# Patient Record
Sex: FEMALE | Race: WHITE | Employment: OTHER | URBAN - NONMETROPOLITAN AREA
[De-identification: names, ages, dates, MRNs, and addresses within clinical notes are randomized per-mention and may not be internally consistent; named-entity substitution may affect disease eponyms.]

---

## 2017-06-13 DIAGNOSIS — C80.1 MALIGNANT NEOPLASM (HCC): ICD-10-CM

## 2017-06-13 DIAGNOSIS — C50.919 MALIGNANT NEOPLASM OF OTHER SPECIFIED SITES OF FEMALE BREAST: ICD-10-CM

## 2017-06-14 PROBLEM — C50.919 MALIGNANT NEOPLASM OF OTHER SPECIFIED SITES OF FEMALE BREAST: Status: ACTIVE | Noted: 2017-06-14

## 2017-06-14 PROBLEM — C50.919 BREAST CANCER (HCC): Status: ACTIVE | Noted: 2017-06-14

## 2017-06-14 PROBLEM — C80.1 MALIGNANT NEOPLASM (HCC): Status: ACTIVE | Noted: 2017-06-14

## 2017-06-14 PROBLEM — I10 HTN (HYPERTENSION): Status: ACTIVE | Noted: 2017-06-14

## 2017-06-14 RX ORDER — LANOLIN ALCOHOL/MO/W.PET/CERES
3 CREAM (GRAM) TOPICAL NIGHTLY
COMMUNITY
End: 2018-11-07 | Stop reason: SDUPTHER

## 2017-06-14 RX ORDER — DIPHENHYDRAMINE HCL 25 MG
37.5 CAPSULE ORAL NIGHTLY
COMMUNITY
End: 2020-01-06

## 2017-06-14 RX ORDER — POLYETHYLENE GLYCOL 3350 17 G/17G
17 POWDER, FOR SOLUTION ORAL DAILY
COMMUNITY
End: 2017-08-09 | Stop reason: SDUPTHER

## 2017-07-05 ENCOUNTER — OFFICE VISIT (OUTPATIENT)
Dept: FAMILY MEDICINE CLINIC | Age: 82
End: 2017-07-05
Payer: MEDICARE

## 2017-07-05 VITALS
BODY MASS INDEX: 25.98 KG/M2 | HEIGHT: 63 IN | DIASTOLIC BLOOD PRESSURE: 60 MMHG | HEART RATE: 58 BPM | WEIGHT: 146.6 LBS | SYSTOLIC BLOOD PRESSURE: 100 MMHG

## 2017-07-05 DIAGNOSIS — R41.3 MEMORY CHANGES: ICD-10-CM

## 2017-07-05 DIAGNOSIS — I10 ESSENTIAL HYPERTENSION: Primary | ICD-10-CM

## 2017-07-05 DIAGNOSIS — Z13.1 SCREENING FOR DIABETES MELLITUS (DM): ICD-10-CM

## 2017-07-05 PROCEDURE — 1090F PRES/ABSN URINE INCON ASSESS: CPT | Performed by: FAMILY MEDICINE

## 2017-07-05 PROCEDURE — G8419 CALC BMI OUT NRM PARAM NOF/U: HCPCS | Performed by: FAMILY MEDICINE

## 2017-07-05 PROCEDURE — 1036F TOBACCO NON-USER: CPT | Performed by: FAMILY MEDICINE

## 2017-07-05 PROCEDURE — G8400 PT W/DXA NO RESULTS DOC: HCPCS | Performed by: FAMILY MEDICINE

## 2017-07-05 PROCEDURE — 4040F PNEUMOC VAC/ADMIN/RCVD: CPT | Performed by: FAMILY MEDICINE

## 2017-07-05 PROCEDURE — 99214 OFFICE O/P EST MOD 30 MIN: CPT | Performed by: FAMILY MEDICINE

## 2017-07-05 PROCEDURE — 1123F ACP DISCUSS/DSCN MKR DOCD: CPT | Performed by: FAMILY MEDICINE

## 2017-07-05 PROCEDURE — G8427 DOCREV CUR MEDS BY ELIG CLIN: HCPCS | Performed by: FAMILY MEDICINE

## 2017-07-05 ASSESSMENT — PATIENT HEALTH QUESTIONNAIRE - PHQ9
1. LITTLE INTEREST OR PLEASURE IN DOING THINGS: 0
2. FEELING DOWN, DEPRESSED OR HOPELESS: 0
SUM OF ALL RESPONSES TO PHQ QUESTIONS 1-9: 0
SUM OF ALL RESPONSES TO PHQ9 QUESTIONS 1 & 2: 0

## 2017-07-05 ASSESSMENT — ENCOUNTER SYMPTOMS
CHEST TIGHTNESS: 0
SHORTNESS OF BREATH: 0
COLOR CHANGE: 1
CONSTIPATION: 1
ABDOMINAL PAIN: 0
DIARRHEA: 1

## 2017-08-09 RX ORDER — POLYETHYLENE GLYCOL 3350 17 G/17G
17 POWDER, FOR SOLUTION ORAL DAILY
Qty: 1 BOTTLE | Refills: 3 | Status: SHIPPED | OUTPATIENT
Start: 2017-08-09 | End: 2017-08-10 | Stop reason: SDUPTHER

## 2017-08-10 RX ORDER — POLYETHYLENE GLYCOL 3350 17 G/17G
17 POWDER, FOR SOLUTION ORAL DAILY
Qty: 1 BOTTLE | Refills: 5 | Status: SHIPPED | OUTPATIENT
Start: 2017-08-10 | End: 2017-12-27 | Stop reason: SDUPTHER

## 2017-10-24 ENCOUNTER — NURSE ONLY (OUTPATIENT)
Dept: FAMILY MEDICINE CLINIC | Age: 82
End: 2017-10-24
Payer: MEDICARE

## 2017-10-24 VITALS — TEMPERATURE: 97.2 F

## 2017-10-24 DIAGNOSIS — Z23 NEED FOR PROPHYLACTIC VACCINATION AND INOCULATION AGAINST INFLUENZA: Primary | ICD-10-CM

## 2017-10-24 PROCEDURE — G0008 ADMIN INFLUENZA VIRUS VAC: HCPCS | Performed by: FAMILY MEDICINE

## 2017-10-24 PROCEDURE — 90662 IIV NO PRSV INCREASED AG IM: CPT | Performed by: FAMILY MEDICINE

## 2017-12-14 LAB
AGE FOR GFR: 85
ANION GAP SERPL CALCULATED.3IONS-SCNC: 15 MMOL/L
BUN BLDV-MCNC: 17 MG/DL
CALCIUM SERPL-MCNC: 9.6 MG/DL
CHLORIDE BLD-SCNC: 102 MMOL/L
CO2: 26 MMOL/L
CREAT SERPL-MCNC: 1 MG/DL
EGFR BF: 64 ML/MIN/1.73 M2
EGFR BM: 86 ML/MIN/1.73 M2
EGFR WF: 53 ML/MIN/1.73 M2
EGFR WM: 71 ML/MIN/1.73 M2
GLUCOSE: 81 MG/DL
POTASSIUM SERPL-SCNC: 4.5 MMOL/L
SODIUM BLD-SCNC: 138 MMOL/L

## 2017-12-27 ENCOUNTER — OFFICE VISIT (OUTPATIENT)
Dept: FAMILY MEDICINE CLINIC | Age: 82
End: 2017-12-27
Payer: MEDICARE

## 2017-12-27 VITALS
BODY MASS INDEX: 26.39 KG/M2 | WEIGHT: 149 LBS | SYSTOLIC BLOOD PRESSURE: 124 MMHG | TEMPERATURE: 97.8 F | DIASTOLIC BLOOD PRESSURE: 70 MMHG | OXYGEN SATURATION: 97 % | HEART RATE: 67 BPM

## 2017-12-27 DIAGNOSIS — R05.9 COUGH: Primary | ICD-10-CM

## 2017-12-27 DIAGNOSIS — L30.9 DERMATITIS: ICD-10-CM

## 2017-12-27 DIAGNOSIS — M54.2 NECK PAIN: ICD-10-CM

## 2017-12-27 LAB
BASOPHILS # BLD: 0.14 THOU/MM3
DIFFERENTIAL: AUTOMATED DIFF
EOSINOPHIL # BLD: 0.43 THOU/MM3
HCT VFR BLD CALC: 43.5 %
HEMOGLOBIN: 13.8 G/DL
LYMPHOCYTES # BLD: 1.51 THOU/MM3
MCH RBC QN AUTO: 29 PG
MCHC RBC AUTO-ENTMCNC: 31.6 G/DL
MCV RBC AUTO: 91.6 FL
MONOCYTES # BLD: 1.04 THOU/MM3
NEUTROPHILS: 5.55 THOU/MM3
PDW BLD-RTO: 12.7 %
PLATELET # BLD: 236 THOU/MM3
PMV BLD AUTO: 10.2 FL
RBC # BLD: 4.76 M/UL
SEDIMENTATION RATE, ERYTHROCYTE: 29 MM/HR
WBC # BLD: 8.66 THOU/ML3

## 2017-12-27 PROCEDURE — 4040F PNEUMOC VAC/ADMIN/RCVD: CPT | Performed by: FAMILY MEDICINE

## 2017-12-27 PROCEDURE — G8400 PT W/DXA NO RESULTS DOC: HCPCS | Performed by: FAMILY MEDICINE

## 2017-12-27 PROCEDURE — 1090F PRES/ABSN URINE INCON ASSESS: CPT | Performed by: FAMILY MEDICINE

## 2017-12-27 PROCEDURE — 1036F TOBACCO NON-USER: CPT | Performed by: FAMILY MEDICINE

## 2017-12-27 PROCEDURE — G8427 DOCREV CUR MEDS BY ELIG CLIN: HCPCS | Performed by: FAMILY MEDICINE

## 2017-12-27 PROCEDURE — 99214 OFFICE O/P EST MOD 30 MIN: CPT | Performed by: FAMILY MEDICINE

## 2017-12-27 PROCEDURE — G8484 FLU IMMUNIZE NO ADMIN: HCPCS | Performed by: FAMILY MEDICINE

## 2017-12-27 PROCEDURE — G8417 CALC BMI ABV UP PARAM F/U: HCPCS | Performed by: FAMILY MEDICINE

## 2017-12-27 PROCEDURE — 1123F ACP DISCUSS/DSCN MKR DOCD: CPT | Performed by: FAMILY MEDICINE

## 2017-12-27 RX ORDER — RANITIDINE 150 MG/1
75 TABLET ORAL 2 TIMES DAILY
Qty: 60 TABLET | Refills: 3 | Status: SHIPPED | OUTPATIENT
Start: 2017-12-27 | End: 2018-07-11 | Stop reason: SDUPTHER

## 2017-12-27 RX ORDER — POLYETHYLENE GLYCOL 3350 17 G/17G
17 POWDER, FOR SOLUTION ORAL DAILY
Qty: 1 BOTTLE | Refills: 5 | Status: SHIPPED | OUTPATIENT
Start: 2017-12-27 | End: 2018-07-11 | Stop reason: SDUPTHER

## 2017-12-27 RX ORDER — CEPHALEXIN 500 MG/1
1000 CAPSULE ORAL 2 TIMES DAILY
Qty: 40 CAPSULE | Refills: 0 | Status: SHIPPED | OUTPATIENT
Start: 2017-12-27 | End: 2018-01-08 | Stop reason: ALTCHOICE

## 2017-12-27 ASSESSMENT — ENCOUNTER SYMPTOMS
WHEEZING: 0
SHORTNESS OF BREATH: 0
DIARRHEA: 0
CONSTIPATION: 0
COUGH: 1
CHEST TIGHTNESS: 0
CHOKING: 0

## 2017-12-27 NOTE — PROGRESS NOTES
[address] [place] [address]  [address]  Dept: [phone]  Dept Fax: [phone]    Taryn Cheney is a 80 y.o. female who presents today for her medical conditions/complaints as noted below. Taryn Cheney is c/o of Neck Pain (for about 1 week now, using IBU on Saturday for the pain ) and Rash (neck and hands, more on the neck one little spot on the palm of the hand and about 4 on the neck, stated those she has ahd about 1 month now)      HPI:     HPI  Started with a rash along the neck about 1 month ago noticed a spot in the back of the neck- itchy. Daughter then notcied this weekend 4 spots on the back of her neck. Small spots on her palm noticed in the last week that itches also. Stiff and achey on the right side of the neck on Saturday but did improve since that time. HAs a cough real hard, not like a cold cough. For the last several months. Has just \"not felt well\" coughs real hard a few times feels like it comes from deeper. Nothing that comes up with it. Not SOB at all. Daughters notice that her voice is not as strong, she complains that she cannot sing like she used to,  The last month or so. No SOB at all. No chills. No nausea, no vomiting no burning or indigestion. \"just an uncomfortable feeling\" when she coughs. Takes a 1/2 of a pill of her husbands he had for his cough and that helps.       BP Readings from Last 3 Encounters:   12/27/17 124/70   07/05/17 100/60          (goal 120/80)    Wt Readings from Last 3 Encounters:   12/27/17 149 lb (67.6 kg)   07/05/17 146 lb 9.6 oz (66.5 kg)        Past Medical History:   Diagnosis Date    Arthritis     neck    BRCA2 genetic carrier     no specifics given     Hypertension       Past Surgical History:   Procedure Laterality Date    BREAST SURGERY Bilateral 1982 2006 bilateral masectomy    COLONOSCOPY      ESOPHGOSCOPY  07/2016    UPPER GASTROINTESTINAL ENDOSCOPY  07/2016 January appt. If the spots on the neck are not improving then she would need to consider biopsy or CT scan of the neck. Lab Results   Component Value Date    WBC 8.66 12/27/2017    HGB 13.8 12/27/2017    HCT 43.5 12/27/2017     12/27/2017     12/14/2017    K 4.5 12/14/2017     12/14/2017    CREATININE 1.0 12/14/2017    BUN 17 12/14/2017    CO2 26 12/14/2017       Return for As scheduled. Patient given educational materials - see patient instructions. Discussed use, benefit, and side effects of prescribed medications. All patient questions answered. Pt voiced understanding. Reviewed health maintenance. Instructed to continue current medications, diet and exercise. Patient agreed with treatment plan. Follow up as directed.      Electronically signed by Deann Lopez MD on 12/27/2017

## 2017-12-27 NOTE — PATIENT INSTRUCTIONS
Try not to wear clothing that rubs on the neck area. Can use OTC hydrocortisone cream on the area on the neck as well as on the hand areas. Will check the CXR and start the twice daily ranitidine for the cough. Will do a short course of oral antibiotics for the neck spots and recheck these at January appt.

## 2018-01-08 ENCOUNTER — OFFICE VISIT (OUTPATIENT)
Dept: FAMILY MEDICINE CLINIC | Age: 83
End: 2018-01-08
Payer: MEDICARE

## 2018-01-08 VITALS
DIASTOLIC BLOOD PRESSURE: 70 MMHG | HEART RATE: 60 BPM | HEIGHT: 63 IN | WEIGHT: 147 LBS | BODY MASS INDEX: 26.05 KG/M2 | OXYGEN SATURATION: 97 % | SYSTOLIC BLOOD PRESSURE: 120 MMHG | TEMPERATURE: 97.6 F

## 2018-01-08 DIAGNOSIS — C80.1 MALIGNANT NEOPLASM (HCC): ICD-10-CM

## 2018-01-08 DIAGNOSIS — Z00.00 ROUTINE GENERAL MEDICAL EXAMINATION AT A HEALTH CARE FACILITY: ICD-10-CM

## 2018-01-08 DIAGNOSIS — R19.8 INCREASED ABDOMINAL GIRTH: ICD-10-CM

## 2018-01-08 DIAGNOSIS — Z23 NEED FOR PROPHYLACTIC VACCINATION AGAINST DIPHTHERIA-TETANUS-PERTUSSIS (DTP): ICD-10-CM

## 2018-01-08 DIAGNOSIS — L30.9 HAND DERMATITIS: Primary | ICD-10-CM

## 2018-01-08 PROCEDURE — 99214 OFFICE O/P EST MOD 30 MIN: CPT | Performed by: FAMILY MEDICINE

## 2018-01-08 PROCEDURE — 4040F PNEUMOC VAC/ADMIN/RCVD: CPT | Performed by: FAMILY MEDICINE

## 2018-01-08 PROCEDURE — G8400 PT W/DXA NO RESULTS DOC: HCPCS | Performed by: FAMILY MEDICINE

## 2018-01-08 PROCEDURE — 1036F TOBACCO NON-USER: CPT | Performed by: FAMILY MEDICINE

## 2018-01-08 PROCEDURE — G8484 FLU IMMUNIZE NO ADMIN: HCPCS | Performed by: FAMILY MEDICINE

## 2018-01-08 PROCEDURE — 1123F ACP DISCUSS/DSCN MKR DOCD: CPT | Performed by: FAMILY MEDICINE

## 2018-01-08 PROCEDURE — 1090F PRES/ABSN URINE INCON ASSESS: CPT | Performed by: FAMILY MEDICINE

## 2018-01-08 PROCEDURE — G8417 CALC BMI ABV UP PARAM F/U: HCPCS | Performed by: FAMILY MEDICINE

## 2018-01-08 PROCEDURE — G0439 PPPS, SUBSEQ VISIT: HCPCS | Performed by: FAMILY MEDICINE

## 2018-01-08 PROCEDURE — G8427 DOCREV CUR MEDS BY ELIG CLIN: HCPCS | Performed by: FAMILY MEDICINE

## 2018-01-08 RX ORDER — FLUTICASONE PROPIONATE 0.05 MG/G
OINTMENT TOPICAL
Qty: 15 G | Refills: 1 | Status: SHIPPED | OUTPATIENT
Start: 2018-01-08 | End: 2018-01-31 | Stop reason: ALTCHOICE

## 2018-01-08 ASSESSMENT — LIFESTYLE VARIABLES
HOW OFTEN DO YOU HAVE A DRINK CONTAINING ALCOHOL: 3
HAVE YOU OR SOMEONE ELSE BEEN INJURED AS A RESULT OF YOUR DRINKING: 0
AUDIT TOTAL SCORE: 3
HAS A RELATIVE, FRIEND, DOCTOR, OR ANOTHER HEALTH PROFESSIONAL EXPRESSED CONCERN ABOUT YOUR DRINKING OR SUGGESTED YOU CUT DOWN: 0
HOW OFTEN DURING THE LAST YEAR HAVE YOU NEEDED AN ALCOHOLIC DRINK FIRST THING IN THE MORNING TO GET YOURSELF GOING AFTER A NIGHT OF HEAVY DRINKING: 0
HOW OFTEN DURING THE LAST YEAR HAVE YOU HAD A FEELING OF GUILT OR REMORSE AFTER DRINKING: 0
HOW OFTEN DURING THE LAST YEAR HAVE YOU BEEN UNABLE TO REMEMBER WHAT HAPPENED THE NIGHT BEFORE BECAUSE YOU HAD BEEN DRINKING: 0
HOW OFTEN DO YOU HAVE SIX OR MORE DRINKS ON ONE OCCASION: 0
AUDIT-C TOTAL SCORE: 3
HOW MANY STANDARD DRINKS CONTAINING ALCOHOL DO YOU HAVE ON A TYPICAL DAY: 0
HOW OFTEN DURING THE LAST YEAR HAVE YOU FOUND THAT YOU WERE NOT ABLE TO STOP DRINKING ONCE YOU HAD STARTED: 0
HOW OFTEN DURING THE LAST YEAR HAVE YOU FAILED TO DO WHAT WAS NORMALLY EXPECTED FROM YOU BECAUSE OF DRINKING: 0

## 2018-01-08 ASSESSMENT — ANXIETY QUESTIONNAIRES: GAD7 TOTAL SCORE: 0

## 2018-01-08 NOTE — PROGRESS NOTES
place, and time. She appears well-developed and well-nourished. HENT:   Head: Normocephalic and atraumatic. Eyes: Conjunctivae and EOM are normal.   Neck: Normal range of motion. Neck supple. No JVD present. No thyromegaly present. Mild kyphosis of the neck   Cardiovascular: Normal rate, regular rhythm and intact distal pulses. Exam reveals no gallop and no friction rub. No murmur heard. Pulmonary/Chest: Effort normal and breath sounds normal. No respiratory distress. Abdominal: Soft. Bowel sounds are normal. She exhibits distension and mass (firmness in the RLQ, ill defined, nontender). She exhibits no shifting dullness, no pulsatile liver, no abdominal bruit and no pulsatile midline mass. There is no hepatosplenomegaly, splenomegaly or hepatomegaly. There is no tenderness. There is no rebound, no tenderness at McBurney's point and negative Salas's sign. No hernia. Hernia confirmed negative in the ventral area, confirmed negative in the right inguinal area and confirmed negative in the left inguinal area. Musculoskeletal: She exhibits no edema. Lymphadenopathy:        Head (right side): No submental and no submandibular adenopathy present. Head (left side): No submental and no submandibular adenopathy present. She has no cervical adenopathy. Right cervical: No superficial cervical adenopathy present. Left cervical: No superficial cervical adenopathy present. Neurological: She is alert and oriented to person, place, and time. Skin: Skin is warm. Erythematous macules on the left palm, ill defined with small vesicles noted. Similar lesions on left hand between the 3-4 finger   Nursing note and vitals reviewed. Patient's complete Health Risk Assessment and screening values have been reviewed and are found in Flowsheets. The following problems were reviewed today and where indicated follow up appointments were made and/or referrals ordered.     1. Hand Date(s) Administered    Influenza Vaccine, unspecified formulation 09/13/2013, 10/13/2014, 10/20/2016    Influenza, High Dose 10/24/2017    Pneumococcal 13-valent Conjugate ([id number]) 08/05/2015    Pneumococcal Polysaccharide ([id number]) 11/18/2008    Zoster 08/04/2014        Health Maintenance   Topic Date Due    DTaP/Tdap/Td vaccine (1 - Tdap) 01/09/1951    Zostavax vaccine  Completed    DEXA (modify frequency per FRAX score)  Completed    Flu vaccine  Completed    Pneumococcal low/med risk  Completed     Recommendations for Preventive Services Due: see orders.   Recommended screening schedule for the next 5-10 years is provided to the patient in written form: see Patient Instructions/AVS.

## 2018-01-08 NOTE — PATIENT INSTRUCTIONS
Personalized Preventive Plan for Celnea Anderson - 1/8/2018  Medicare offers a range of preventive health benefits. Some of the tests and screenings are paid in full while other may be subject to a deductible, co-insurance, and/or copay. Some of these benefits include a comprehensive review of your medical history including lifestyle, illnesses that may run in your family, and various assessments and screenings as appropriate. After reviewing your medical record and screening and assessments performed today your provider may have ordered immunizations, labs, imaging, and/or referrals for you. A list of these orders (if applicable) as well as your Preventive Care list are included within your After Visit Summary for your review. Other Preventive Recommendations:    · A preventive eye exam performed by an eye specialist is recommended every 1-2 years to screen for glaucoma; cataracts, macular degeneration, and other eye disorders. · A preventive dental visit is recommended every 6 months. · Try to get at least 150 minutes of exercise per week or 10,000 steps per day on a pedometer . · Order or download the FREE \"Exercise & Physical Activity: Your Everyday Guide\" from [employer]. Call 3-794.446.9750 or search [employer] online. · You need 9730-4541 mg of calcium and 5690-6213 IU of vitamin D per day. It is possible to meet your calcium requirement with diet alone, but a vitamin D supplement is usually necessary to meet this goal.  · When exposed to the sun, use a sunscreen that protects against both UVA and UVB radiation with an SPF of 30 or greater. Reapply every 2 to 3 hours or after sweating, drying off with a towel, or swimming. · Always wear a seat belt when traveling in a car.

## 2018-01-12 ENCOUNTER — TELEPHONE (OUTPATIENT)
Dept: FAMILY MEDICINE CLINIC | Age: 83
End: 2018-01-12

## 2018-01-15 ENCOUNTER — OFFICE VISIT (OUTPATIENT)
Dept: FAMILY MEDICINE CLINIC | Age: 83
End: 2018-01-15
Payer: MEDICARE

## 2018-01-15 VITALS
SYSTOLIC BLOOD PRESSURE: 122 MMHG | OXYGEN SATURATION: 97 % | WEIGHT: 147 LBS | HEART RATE: 72 BPM | DIASTOLIC BLOOD PRESSURE: 60 MMHG | BODY MASS INDEX: 26.04 KG/M2

## 2018-01-15 DIAGNOSIS — R05.9 COUGH: ICD-10-CM

## 2018-01-15 DIAGNOSIS — C56.1 MALIGNANT NEOPLASM OF RIGHT OVARY (HCC): Primary | ICD-10-CM

## 2018-01-15 DIAGNOSIS — Z15.01 BRCA2 GENETIC CARRIER: ICD-10-CM

## 2018-01-15 DIAGNOSIS — Z15.09 BRCA2 GENETIC CARRIER: ICD-10-CM

## 2018-01-15 PROCEDURE — G8427 DOCREV CUR MEDS BY ELIG CLIN: HCPCS | Performed by: FAMILY MEDICINE

## 2018-01-15 PROCEDURE — 1090F PRES/ABSN URINE INCON ASSESS: CPT | Performed by: FAMILY MEDICINE

## 2018-01-15 PROCEDURE — G8484 FLU IMMUNIZE NO ADMIN: HCPCS | Performed by: FAMILY MEDICINE

## 2018-01-15 PROCEDURE — 1123F ACP DISCUSS/DSCN MKR DOCD: CPT | Performed by: FAMILY MEDICINE

## 2018-01-15 PROCEDURE — 1036F TOBACCO NON-USER: CPT | Performed by: FAMILY MEDICINE

## 2018-01-15 PROCEDURE — 99214 OFFICE O/P EST MOD 30 MIN: CPT | Performed by: FAMILY MEDICINE

## 2018-01-15 PROCEDURE — 4040F PNEUMOC VAC/ADMIN/RCVD: CPT | Performed by: FAMILY MEDICINE

## 2018-01-15 PROCEDURE — G8417 CALC BMI ABV UP PARAM F/U: HCPCS | Performed by: FAMILY MEDICINE

## 2018-01-15 RX ORDER — BENZONATATE 200 MG/1
200 CAPSULE ORAL 3 TIMES DAILY PRN
Qty: 30 CAPSULE | Refills: 0 | Status: SHIPPED | OUTPATIENT
Start: 2018-01-15 | End: 2018-02-15 | Stop reason: ALTCHOICE

## 2018-01-15 ASSESSMENT — ENCOUNTER SYMPTOMS
CHOKING: 1
ABDOMINAL PAIN: 0
COUGH: 1
ABDOMINAL DISTENTION: 1
WHEEZING: 0
CHEST TIGHTNESS: 1
SHORTNESS OF BREATH: 0

## 2018-01-22 LAB
A/G RATIO: 0.8 RATIO
AGE FOR GFR: 86
ALBUMIN: 3.4 G/DL
ALK PHOSPHATASE: 83 UNITS/L
ALT SERPL-CCNC: 34 UNITS/L
ANION GAP SERPL CALCULATED.3IONS-SCNC: 13 MMOL/L
APPEARANCE, BODY FLUID: NORMAL
AST SERPL-CCNC: 35 UNITS/L
BASOPHILS # BLD: 0.15 THOU/MM3
BILIRUB SERPL-MCNC: 0.3 MG/DL
BODY FLUID RBC: NORMAL
BODY FLUID TYPE: NORMAL
BODY FLUID WBC: NORMAL
BUN BLDV-MCNC: 16 MG/DL
CA 125: NORMAL
CALCIUM SERPL-MCNC: 8.7 MG/DL
CHLORIDE BLD-SCNC: 101 MMOL/L
CO2: 23 MMOL/L
COLOR, BODY FLUID: NORMAL
CREAT SERPL-MCNC: 0.8 MG/DL
DIFFERENTIAL: AUTOMATED DIFF
EGFR BF: 82 ML/MIN/1.73 M2
EGFR BM: 111 ML/MIN/1.73 M2
EGFR WF: 68 ML/MIN/1.73 M2
EGFR WM: 92 ML/MIN/1.73 M2
EOSINOPHIL # BLD: 0.55 THOU/MM3
GLOBULIN: 4.4 G/DL
GLUCOSE, FLUID: NORMAL
GLUCOSE: 111 MG/DL
HCT VFR BLD CALC: 40.8 %
HEMOGLOBIN: 13.9 G/DL
LACTATE DEHYDROGENASE: 1022 UNITS/L
LYMPHOCYTES # BLD: 2.43 THOU/MM3
MCH RBC QN AUTO: 30 PG
MCHC RBC AUTO-ENTMCNC: 34.1 G/DL
MCV RBC AUTO: 88 FL
MISCELLANEOUS LAB TEST RESULT: NORMAL
MISCELLANEOUS LAB TEST RESULT: NORMAL
MONOCYTES # BLD: 1.02 THOU/MM3
NEUTROPHILS: 7.88 THOU/MM3
PDW BLD-RTO: 12.1 %
PLATELET # BLD: 364 THOU/MM3
PMV BLD AUTO: 9.2 FL
POTASSIUM SERPL-SCNC: 4.2 MMOL/L
RBC # BLD: 4.64 M/UL
SODIUM BLD-SCNC: 133 MMOL/L
TOTAL PROTEIN: 7.8 G/DL
WBC # BLD: 12.03 THOU/ML3

## 2018-01-23 ENCOUNTER — TELEPHONE (OUTPATIENT)
Dept: FAMILY MEDICINE CLINIC | Age: 83
End: 2018-01-23

## 2018-01-25 ENCOUNTER — PREP FOR PROCEDURE (OUTPATIENT)
Dept: GYNECOLOGIC ONCOLOGY | Age: 83
End: 2018-01-25

## 2018-01-25 ENCOUNTER — OFFICE VISIT (OUTPATIENT)
Dept: GYNECOLOGIC ONCOLOGY | Age: 83
End: 2018-01-25
Payer: MEDICARE

## 2018-01-25 VITALS
OXYGEN SATURATION: 97 % | DIASTOLIC BLOOD PRESSURE: 74 MMHG | TEMPERATURE: 97.1 F | HEIGHT: 63 IN | SYSTOLIC BLOOD PRESSURE: 132 MMHG | BODY MASS INDEX: 25.12 KG/M2 | WEIGHT: 141.8 LBS | HEART RATE: 72 BPM

## 2018-01-25 DIAGNOSIS — Z15.01 BRCA2 GENETIC CARRIER: ICD-10-CM

## 2018-01-25 DIAGNOSIS — Z01.818 PREOP TESTING: Primary | ICD-10-CM

## 2018-01-25 DIAGNOSIS — R19.00 PELVIC MASS: Primary | ICD-10-CM

## 2018-01-25 DIAGNOSIS — N94.89 ADNEXAL MASS: ICD-10-CM

## 2018-01-25 DIAGNOSIS — R97.1 ELEVATED CA-125: ICD-10-CM

## 2018-01-25 DIAGNOSIS — Z15.09 BRCA2 GENETIC CARRIER: ICD-10-CM

## 2018-01-25 DIAGNOSIS — J90 PLEURAL EFFUSION: ICD-10-CM

## 2018-01-25 PROCEDURE — 4040F PNEUMOC VAC/ADMIN/RCVD: CPT | Performed by: OBSTETRICS & GYNECOLOGY

## 2018-01-25 PROCEDURE — 99203 OFFICE O/P NEW LOW 30 MIN: CPT | Performed by: OBSTETRICS & GYNECOLOGY

## 2018-01-25 PROCEDURE — G8417 CALC BMI ABV UP PARAM F/U: HCPCS | Performed by: OBSTETRICS & GYNECOLOGY

## 2018-01-25 PROCEDURE — G8427 DOCREV CUR MEDS BY ELIG CLIN: HCPCS | Performed by: OBSTETRICS & GYNECOLOGY

## 2018-01-25 PROCEDURE — 1090F PRES/ABSN URINE INCON ASSESS: CPT | Performed by: OBSTETRICS & GYNECOLOGY

## 2018-01-25 PROCEDURE — G8484 FLU IMMUNIZE NO ADMIN: HCPCS | Performed by: OBSTETRICS & GYNECOLOGY

## 2018-01-25 RX ORDER — TORSEMIDE 10 MG/1
10 TABLET ORAL
COMMUNITY
Start: 2018-01-22 | End: 2018-04-23 | Stop reason: ALTCHOICE

## 2018-01-25 RX ORDER — SODIUM CHLORIDE 0.9 % (FLUSH) 0.9 %
10 SYRINGE (ML) INJECTION EVERY 12 HOURS SCHEDULED
Status: CANCELLED | OUTPATIENT
Start: 2018-01-25

## 2018-01-25 RX ORDER — SODIUM CHLORIDE 0.9 % (FLUSH) 0.9 %
10 SYRINGE (ML) INJECTION PRN
Status: CANCELLED | OUTPATIENT
Start: 2018-01-25

## 2018-01-25 RX ORDER — SODIUM CHLORIDE 9 MG/ML
INJECTION, SOLUTION INTRAVENOUS CONTINUOUS
Status: CANCELLED | OUTPATIENT
Start: 2018-01-25

## 2018-01-25 ASSESSMENT — ENCOUNTER SYMPTOMS
DIARRHEA: 0
EYES NEGATIVE: 1
COUGH: 1
ALLERGIC/IMMUNOLOGIC NEGATIVE: 1
NAUSEA: 0
BLOOD IN STOOL: 0
CONSTIPATION: 0
ABDOMINAL PAIN: 0

## 2018-01-25 NOTE — PATIENT INSTRUCTIONS
Patient Education        Abdominal Hysterectomy: Before Your Surgery  What is an abdominal hysterectomy? Abdominal hysterectomy is surgery to remove the uterus. The cervix is often taken out too. This is done through a cut in the lower belly. The cut is called an incision. The ovaries and fallopian tubes also may be removed. The doctor may make a horizontal incision. This cut goes from side-to-side and is done at the pubic hairline. It's called a \"bikini cut. \" Or the incision may be vertical. This type goes up and down between the belly button and the pubic bone. Both types leave a scar that most often fades with time. After the surgery, you will no longer have periods or be able to get pregnant. Your doctor may have you take hormones if your ovaries were removed. He or she will talk to you about the risks and benefits of hormones. You can also discuss how long you should take them. This surgery probably won't lower your interest in sex. In fact, some women enjoy sex more. This may be because they no longer have to worry about birth control or heavy bleeding. Most women go home in 1 to 2 days. You will likely feel better each day. But you may need about 4 to 6 weeks to fully recover. Follow-up care is a key part of your treatment and safety. Be sure to make and go to all appointments, and call your doctor if you are having problems. It's also a good idea to know your test results and keep a list of the medicines you take. What happens before surgery? ?Surgery can be stressful. This information will help you understand what you can expect. And it will help you safely prepare for surgery. ? Preparing for surgery  ? · Understand exactly what surgery is planned, along with the risks, benefits, and other options. · Tell your doctors ALL the medicines, vitamins, supplements, and herbal remedies you take. Some of these can increase the risk of bleeding or interact with anesthesia. ?  · If you take blood routine. When should you call your doctor? · You have questions or concerns. ? · You don't understand how to prepare for your surgery. ? · You become ill before the surgery (such as fever, flu, or a cold). ? · You need to reschedule or have changed your mind about having the surgery. Where can you learn more? Go to [email or web address] and sign in to your [employer] account. Enter I748 in the [employer] box to learn more about \"Abdominal Hysterectomy: Before Your Surgery. \"     If you do not have an account, please click on the \"Sign Up Now\" link. Current as of: October 13, 2016  Content Version: 11.5  © 9742-6706 Healthwise, Incorporated. Care instructions adapted under license by [facility] ([facility]). If you have questions about a medical condition or this instruction, always ask your healthcare professional. [address] any warranty or liability for your use of this information.

## 2018-01-25 NOTE — PROGRESS NOTES
encounter. No orders of the defined types were placed in this encounter.          Electronically signed by Brooks Cuellar MD on 1/25/2018 at 1:04 PM

## 2018-01-25 NOTE — H&P
frequency, hematuria, pelvic pain, urgency, vaginal bleeding and vaginal discharge. Musculoskeletal: Negative. Skin: Negative. Allergic/Immunologic: Negative. Neurological: Negative. Hematological: Negative. Psychiatric/Behavioral: Negative. Past Medical History:   Diagnosis Date    Arthritis     neck    BRCA2 genetic carrier     no specifics given     Hypertension        Past Surgical History:   Procedure Laterality Date    BREAST SURGERY Bilateral 1982 2006 bilateral masectomy    COLONOSCOPY      ESOPHGOSCOPY  07/2016    UPPER GASTROINTESTINAL ENDOSCOPY  07/2016       Family History   Problem Relation Age of Onset    Breast Cancer Mother      breast cancer    Other Mother      gallstones, water retention    Other Father      parkinsons    Breast Cancer Other      states sibling    Hypertension Other      states sibling     Osteoarthritis Other      states sibling had Severe     Stroke Sister     Endometrial Cancer Sister     Breast Cancer Sister     BRCA 1/2 Sister     Hypertension Sister     Breast Cancer Daughter        Social History     Social History    Marital status:      Spouse name: Norm    Number of children: N/A    Years of education: N/A     Social History Main Topics    Smoking status: Never Smoker    Smokeless tobacco: Never Used    Alcohol use 0.6 oz/week     1 Glasses of wine per week    Drug use: No    Sexual activity: Not Currently     Partners: Male     Other Topics Concern    None     Social History Narrative    None       Past [address] does contribute to today's presenting complaint.       Current Outpatient Prescriptions   Medication Sig Dispense Refill    BOOSTRIX 5-2.5-18.5 injection       torsemide (DEMADEX) 10 MG tablet       metoprolol tartrate (LOPRESSOR) 25 MG tablet TAKE 1 TABLET TWICE DAILY (Patient taking differently: TAKE 1 1/2 TABLET TWICE DAILY) 180 tablet 3    benzonatate (TESSALON) 200 MG capsule Take 1 capsule by elderly female with a large pelvic mass, pleural effusion, elevated CA-125 and history of a BRCA2 gene mutation. Although the pleural effusion has not been confirmed to be malignant, this clinical picture is highly suspicious for either a primary ovarian cancer or possibly recurrent breast cancer metastatic to both ovaries. We discussed options including surgical evaluation with total abdominal hysterectomy, bilateral salpingo-oophorectomy, tumor debulking or staging as indicated versus neoadjuvant chemotherapy versus palliative care. The patient is interested in possibly pursuing surgical extirpation of the mass as she feels that this may contribute to an extended quality of life as opposed to doing nothing. I think that she is a reasonable candidate for surgery but I did tell her that she may have to have her pleural effusion drained again just prior to anesthesia. In addition, we will ensure that she is medically cleared by Dr. Lissy Nielson prior to proceeding to the operating room. I did encourage the patient to keep her appointment with Dr. Meaghan Enciso for hematology oncology consultation. And if after meeting with him and following up with Dr. Lissy Nielson she does want to pursue surgery we have tentatively scheduled that for February 6, 2018. The patient was counseled on the risks, benefits and alternatives of the procedure to include but not limited to: Infection, bleeding, blood transfusion, damage to internal organs such as bowel, bladder, ureters, blood vessels and nerves, deep vein thrombosis, pulmonary embolism, ICU care, anesthesia risks and death. Questions were answered, she voices understanding and desires to proceed. Plan:     Return in about 2 weeks (around 2/8/2018) for 3 hour surgery. No orders of the defined types were placed in this encounter. No orders of the defined types were placed in this encounter.          Electronically signed by Gris Mar MD on 1/25/2018 at 1:04

## 2018-01-25 NOTE — LETTER
[address]  [address]  [address] -  [facility] [address]  Phone: [phone]  Fax: [phone]    [place] Love, [address]        January 25, 2018     Eliz Cancino MD  [address] [facility]    Patient: Kari Harvey  MR Number: [id number]  YOB: 1932  Date of Visit: 1/25/2018    Dear Dr. Eliz Cancino: Thank you for the request for consultation for Kari Harvey to me for the evaluation of ovarian mass, elevated . Below are the relevant portions of my assessment and plan of care. Assessment:     1. Pelvic mass     2. Elevated CA-125     3. BRCA2 genetic carrier     This patient is a relatively healthy, elderly female with a large pelvic mass, pleural effusion, elevated CA-125 and history of a BRCA2 gene mutation. Although the pleural effusion has not been confirmed to be malignant, this clinical picture is highly suspicious for either a primary ovarian cancer or possibly recurrent breast cancer metastatic to both ovaries. We discussed options including surgical evaluation with total abdominal hysterectomy, bilateral salpingo-oophorectomy, tumor debulking or staging as indicated versus neoadjuvant chemotherapy versus palliative care. The patient is interested in possibly pursuing surgical extirpation of the mass as she feels that this may contribute to an extended quality of life as opposed to doing nothing. I think that she is a reasonable candidate for surgery but I did tell her that she may have to have her pleural effusion drained again just prior to anesthesia. In addition, we will ensure that she is medically cleared by Dr. Sky Maciel prior to proceeding to the operating room. I did encourage the patient to keep her appointment with Dr. Derian Valerio for hematology oncology consultation.   And if after meeting with him and following up with Dr. Sky Maciel she does want to

## 2018-01-26 ENCOUNTER — TELEPHONE (OUTPATIENT)
Dept: FAMILY MEDICINE CLINIC | Age: 83
End: 2018-01-26

## 2018-01-26 DIAGNOSIS — Z01.812 ENCOUNTER FOR PRE-OPERATIVE LABORATORY TESTING: Primary | ICD-10-CM

## 2018-01-26 DIAGNOSIS — I10 ESSENTIAL HYPERTENSION: ICD-10-CM

## 2018-01-26 DIAGNOSIS — I13.0 HYPERTENSIVE HEART AND CHRONIC KIDNEY DISEASE WITH HEART FAILURE AND STAGE 1 THROUGH STAGE 4 CHRONIC KIDNEY DISEASE, OR CHRONIC KIDNEY DISEASE (HCC): ICD-10-CM

## 2018-01-26 DIAGNOSIS — J90 PLEURAL EFFUSION EXUDATIVE: ICD-10-CM

## 2018-01-26 DIAGNOSIS — J90 PLEURAL EFFUSION ON RIGHT: Primary | ICD-10-CM

## 2018-01-26 LAB
AGE FOR GFR: 86
ANION GAP SERPL CALCULATED.3IONS-SCNC: 15 MMOL/L
BUN BLDV-MCNC: 25 MG/DL
CHLORIDE BLD-SCNC: 96 MMOL/L
CO2: 27 MMOL/L
CREAT SERPL-MCNC: 1.2 MG/DL
EGFR BF: 52 ML/MIN/1.73 M2
EGFR BM: 70 ML/MIN/1.73 M2
EGFR WF: 43 ML/MIN/1.73 M2
EGFR WM: 57 ML/MIN/1.73 M2
POTASSIUM SERPL-SCNC: 4.6 MMOL/L
SODIUM BLD-SCNC: 133 MMOL/L

## 2018-01-28 NOTE — TELEPHONE ENCOUNTER
Spoke with Reji Miranda last night-  Celena was coughing a fair amount at the time. I would se ehow she does over the weekend. Would recommend referral for repeat thoracentesis on Monday or Tuesday if she has increasing SOB. They are to let me know on Monday how she is doing.     Has appt for echo on Monday am-- they will let us know how she is doing after that

## 2018-01-30 ENCOUNTER — TELEPHONE (OUTPATIENT)
Dept: FAMILY MEDICINE CLINIC | Age: 83
End: 2018-01-30

## 2018-01-31 ENCOUNTER — HOSPITAL ENCOUNTER (OUTPATIENT)
Dept: GENERAL RADIOLOGY | Age: 83
Discharge: HOME | End: 2018-02-02
Payer: MEDICARE

## 2018-01-31 ENCOUNTER — HOSPITAL ENCOUNTER (OUTPATIENT)
Dept: PREADMISSION TESTING | Age: 83
Discharge: HOME | End: 2018-02-04
Payer: MEDICARE

## 2018-01-31 VITALS
TEMPERATURE: 97.8 F | BODY MASS INDEX: 24.8 KG/M2 | OXYGEN SATURATION: 97 % | HEIGHT: 63 IN | DIASTOLIC BLOOD PRESSURE: 71 MMHG | RESPIRATION RATE: 16 BRPM | HEART RATE: 70 BPM | WEIGHT: 140 LBS | SYSTOLIC BLOOD PRESSURE: 107 MMHG

## 2018-01-31 LAB
ABO/RH: NORMAL
ABSOLUTE EOS #: 0.21 K/UL (ref 0–0.44)
ABSOLUTE IMMATURE GRANULOCYTE: 0.06 K/UL (ref 0–0.3)
ABSOLUTE LYMPH #: 1.62 K/UL (ref 1.1–3.7)
ABSOLUTE MONO #: 1.24 K/UL (ref 0.1–1.2)
ANION GAP SERPL CALCULATED.3IONS-SCNC: 14 MMOL/L (ref 9–17)
ANTIBODY SCREEN: NEGATIVE
ARM BAND NUMBER: NORMAL
BASOPHILS # BLD: 1 % (ref 0–2)
BASOPHILS ABSOLUTE: 0.05 K/UL (ref 0–0.2)
BILIRUBIN URINE: NEGATIVE
BUN BLDV-MCNC: 18 MG/DL (ref 8–23)
CHLORIDE BLD-SCNC: 95 MMOL/L (ref 98–107)
CO2: 22 MMOL/L (ref 20–31)
COLOR: YELLOW
COMMENT UA: NORMAL
CREAT SERPL-MCNC: 0.84 MG/DL (ref 0.5–0.9)
DIFFERENTIAL TYPE: ABNORMAL
EKG ATRIAL RATE: 64 BPM
EKG P AXIS: 73 DEGREES
EKG P-R INTERVAL: 150 MS
EKG Q-T INTERVAL: 388 MS
EKG QRS DURATION: 84 MS
EKG QTC CALCULATION (BAZETT): 400 MS
EKG R AXIS: 48 DEGREES
EKG T AXIS: 28 DEGREES
EKG VENTRICULAR RATE: 64 BPM
EOSINOPHILS RELATIVE PERCENT: 2 % (ref 1–4)
EXPIRATION DATE: NORMAL
GFR AFRICAN AMERICAN: >60 ML/MIN
GFR NON-AFRICAN AMERICAN: >60 ML/MIN
GFR SERPL CREATININE-BSD FRML MDRD: NORMAL ML/MIN/{1.73_M2}
GFR SERPL CREATININE-BSD FRML MDRD: NORMAL ML/MIN/{1.73_M2}
GLUCOSE BLD-MCNC: 96 MG/DL (ref 70–99)
GLUCOSE URINE: NEGATIVE
HCT VFR BLD CALC: 41.1 % (ref 36.3–47.1)
HEMOGLOBIN: 12.8 G/DL (ref 11.9–15.1)
IMMATURE GRANULOCYTES: 1 %
INR BLD: 0.9
KETONES, URINE: NEGATIVE
LEUKOCYTE ESTERASE, URINE: NEGATIVE
LYMPHOCYTES # BLD: 15 % (ref 24–43)
MCH RBC QN AUTO: 27.8 PG (ref 25.2–33.5)
MCHC RBC AUTO-ENTMCNC: 31.1 G/DL (ref 28.4–34.8)
MCV RBC AUTO: 89.2 FL (ref 82.6–102.9)
MONOCYTES # BLD: 11 % (ref 3–12)
NITRITE, URINE: NEGATIVE
NRBC AUTOMATED: 0 PER 100 WBC
PARTIAL THROMBOPLASTIN TIME: 24.7 SEC (ref 21.3–31.3)
PDW BLD-RTO: 13.4 % (ref 11.8–14.4)
PH UA: 5 (ref 5–8)
PLATELET # BLD: 305 K/UL (ref 138–453)
PLATELET ESTIMATE: ABNORMAL
PMV BLD AUTO: 12.1 FL (ref 8.1–13.5)
POTASSIUM SERPL-SCNC: 4.2 MMOL/L (ref 3.7–5.3)
PROTEIN UA: NEGATIVE
PROTHROMBIN TIME: 10 SEC (ref 9.4–12.6)
RBC # BLD: 4.61 M/UL (ref 3.95–5.11)
RBC # BLD: ABNORMAL 10*6/UL
SEG NEUTROPHILS: 70 % (ref 36–65)
SEGMENTED NEUTROPHILS ABSOLUTE COUNT: 7.81 K/UL (ref 1.5–8.1)
SODIUM BLD-SCNC: 131 MMOL/L (ref 135–144)
SPECIFIC GRAVITY UA: 1 (ref 1–1.03)
TURBIDITY: CLEAR
URINE HGB: NEGATIVE
UROBILINOGEN, URINE: NORMAL
WBC # BLD: 11 K/UL (ref 3.5–11.3)
WBC # BLD: ABNORMAL 10*3/UL

## 2018-01-31 PROCEDURE — 81003 URINALYSIS AUTO W/O SCOPE: CPT

## 2018-01-31 PROCEDURE — 85025 COMPLETE CBC W/AUTO DIFF WBC: CPT

## 2018-01-31 PROCEDURE — 82947 ASSAY GLUCOSE BLOOD QUANT: CPT

## 2018-01-31 PROCEDURE — 85730 THROMBOPLASTIN TIME PARTIAL: CPT

## 2018-01-31 PROCEDURE — 85610 PROTHROMBIN TIME: CPT

## 2018-01-31 PROCEDURE — 80051 ELECTROLYTE PANEL: CPT

## 2018-01-31 PROCEDURE — 71046 X-RAY EXAM CHEST 2 VIEWS: CPT

## 2018-01-31 PROCEDURE — 86850 RBC ANTIBODY SCREEN: CPT

## 2018-01-31 PROCEDURE — 86900 BLOOD TYPING SEROLOGIC ABO: CPT

## 2018-01-31 PROCEDURE — 82565 ASSAY OF CREATININE: CPT

## 2018-01-31 PROCEDURE — 86901 BLOOD TYPING SEROLOGIC RH(D): CPT

## 2018-01-31 PROCEDURE — 36415 COLL VENOUS BLD VENIPUNCTURE: CPT

## 2018-01-31 PROCEDURE — 93005 ELECTROCARDIOGRAM TRACING: CPT

## 2018-01-31 PROCEDURE — 84520 ASSAY OF UREA NITROGEN: CPT

## 2018-01-31 RX ORDER — SODIUM CHLORIDE, SODIUM LACTATE, POTASSIUM CHLORIDE, CALCIUM CHLORIDE 600; 310; 30; 20 MG/100ML; MG/100ML; MG/100ML; MG/100ML
1000 INJECTION, SOLUTION INTRAVENOUS CONTINUOUS
Status: CANCELLED | OUTPATIENT
Start: 2018-01-31

## 2018-01-31 NOTE — ANESTHESIA PRE-OP
No    Medical or cardiac clearance ordered:                                         No    Anesthesiologist called:                                                                No    RONA Tovar PA-C  Electronically signed 1/31/2018 at 10:57 AM

## 2018-02-01 ENCOUNTER — TELEPHONE (OUTPATIENT)
Dept: GYNECOLOGIC ONCOLOGY | Age: 83
End: 2018-02-01

## 2018-02-01 DIAGNOSIS — J90 RECURRENT RIGHT PLEURAL EFFUSION: Primary | ICD-10-CM

## 2018-02-01 NOTE — TELEPHONE ENCOUNTER
I spoke with the Dr Vandana Gandhi @ [phone] to inform her of Dr Cece Ascencio recommendation for repeat [place] and if needed thoracentesis, she voices agreement and understanding of POC. The dtr does report the pt goes back to Dr Jaspal Sher on Friday and may get a repeat thoracentesis then.   Order for repeat cxray added in epic      Order to be faxed to [facility] per dtr's request

## 2018-02-02 ENCOUNTER — OFFICE VISIT (OUTPATIENT)
Dept: FAMILY MEDICINE CLINIC | Age: 83
End: 2018-02-02
Payer: MEDICARE

## 2018-02-02 VITALS
SYSTOLIC BLOOD PRESSURE: 114 MMHG | OXYGEN SATURATION: 98 % | BODY MASS INDEX: 25.24 KG/M2 | DIASTOLIC BLOOD PRESSURE: 64 MMHG | HEART RATE: 68 BPM | WEIGHT: 142.5 LBS

## 2018-02-02 DIAGNOSIS — C56.1 MALIGNANT NEOPLASM OF RIGHT OVARY (HCC): Primary | ICD-10-CM

## 2018-02-02 DIAGNOSIS — Z15.09 BRCA2 GENETIC CARRIER: ICD-10-CM

## 2018-02-02 DIAGNOSIS — Z15.01 BRCA2 GENETIC CARRIER: ICD-10-CM

## 2018-02-02 DIAGNOSIS — J90 PLEURAL EFFUSION ON RIGHT: ICD-10-CM

## 2018-02-02 DIAGNOSIS — I10 ESSENTIAL HYPERTENSION: ICD-10-CM

## 2018-02-02 PROCEDURE — G8484 FLU IMMUNIZE NO ADMIN: HCPCS | Performed by: FAMILY MEDICINE

## 2018-02-02 PROCEDURE — 99214 OFFICE O/P EST MOD 30 MIN: CPT | Performed by: FAMILY MEDICINE

## 2018-02-02 PROCEDURE — G8427 DOCREV CUR MEDS BY ELIG CLIN: HCPCS | Performed by: FAMILY MEDICINE

## 2018-02-02 PROCEDURE — G8417 CALC BMI ABV UP PARAM F/U: HCPCS | Performed by: FAMILY MEDICINE

## 2018-02-02 PROCEDURE — 4040F PNEUMOC VAC/ADMIN/RCVD: CPT | Performed by: FAMILY MEDICINE

## 2018-02-02 PROCEDURE — 1036F TOBACCO NON-USER: CPT | Performed by: FAMILY MEDICINE

## 2018-02-02 PROCEDURE — 1090F PRES/ABSN URINE INCON ASSESS: CPT | Performed by: FAMILY MEDICINE

## 2018-02-02 PROCEDURE — 1123F ACP DISCUSS/DSCN MKR DOCD: CPT | Performed by: FAMILY MEDICINE

## 2018-02-02 NOTE — PROGRESS NOTES
Respiratory: Positive for cough. Negative for choking, chest tightness, shortness of breath (not too bad right now), wheezing and stridor. Cardiovascular: Negative for chest pain, palpitations and leg swelling. Gastrointestinal: Positive for abdominal distention. Negative for constipation, diarrhea and nausea. Endocrine: Negative for cold intolerance and heat intolerance. Genitourinary: Negative for difficulty urinating, dyspareunia, hematuria, pelvic pain and urgency. Psychiatric/Behavioral: Negative for confusion and decreased concentration. The patient is not nervous/anxious and is not hyperactive. Objective:     /64   Pulse 68   Wt 142 lb 8 oz (64.6 kg)   LMP  (LMP Unknown)   SpO2 98%   BMI 25.24 kg/m²     Physical Exam   Constitutional: She is oriented to person, place, and time. She appears well-developed and well-nourished. HENT:   Head: Normocephalic. Right Ear: External ear normal.   Left Ear: External ear normal.   Eyes: Pupils are equal, round, and reactive to light. Neck: Normal range of motion. Neck supple. No thyromegaly present. Cardiovascular: Normal rate, regular rhythm, normal heart sounds and intact distal pulses. No murmur heard. Pulmonary/Chest: Effort normal and breath sounds normal.   Abdominal: Soft. She exhibits distension and mass. Lower abd mass is mildly tender at this time   Lymphadenopathy:     She has no cervical adenopathy. Neurological: She is alert and oriented to person, place, and time. No cranial nerve deficit. Nursing note and vitals reviewed. Assessment/Plan:     1. Malignant neoplasm of right ovary ([address]     2. Essential hypertension     3. Pleural effusion on right  US THORACENTESIS   4. BRCA2 genetic carrier       Celena is cleared for the proposed surgery from standpoint of the known medical conditions. Had an echo 1/29 which showed nl LV function, moderate TR and trace MR.       Arrangements made for repeat

## 2018-02-03 PROBLEM — N94.89 ADNEXAL MASS: Status: ACTIVE | Noted: 2018-02-03

## 2018-02-03 PROBLEM — J90 PLEURAL EFFUSION: Status: ACTIVE | Noted: 2018-02-03

## 2018-02-04 ASSESSMENT — ENCOUNTER SYMPTOMS
CHOKING: 0
COUGH: 1
WHEEZING: 0
DIARRHEA: 0
NAUSEA: 0
SHORTNESS OF BREATH: 0
CHEST TIGHTNESS: 0
STRIDOR: 0
ABDOMINAL DISTENTION: 1
CONSTIPATION: 0

## 2018-02-04 NOTE — H&P
OB/GYN Pre-Op H&P  [facility]    Patient Name: Angela Sharp     Patient : 1932  Room/Bed: [facility] OR [facility]/NONE  Admission Date/Time: 2018  8:35 AM  Primary Care Physician: Carly Ng MD  MRN: [id number]    Date: 2018  Time: 9:35 AM    The patient was seen in pre-op holding. She is here for SHADI, BSO, Frozen section, possible tumor debulking, staging. The procedure risks and complications were reviewed. The labs, Consent, and H&P were reviewed and updated. The patient was counseled on the possibility of  the need of a second surgery. The patient voiced understanding and had all of her questions answered. The possibility of incomplete removal of abnormal tissue was discussed. HPI:  80 y.o.   5 Para 5 woman, seen at the request of Dr. Keon Nettles for pelvic mass, elevated . The patient presented complaining of cough and shortness of breath. A CT of the chest was obtained on 2017. There is no evidence of pulmonary embolism, however she was noted to have a large right pleural effusion with atelectasis in the right lower lobe and ascites. Thoracentesis was performed and approximately 1500 mL of fluid was aspirated. CT of the abdomen and pelvis was also obtained. The liver and spleen, gallbladder, pancreas and adrenal glands were normal.  There was a 4.2 cm lobulated left renal cyst.  There was a mild ileus with fluid-filled small bowel and mild ascites especially around the liver and spleen. She had a large, complex, cystic, irregular enhancing pelvic mass with smooth margins measuring 17 x 12 cm. CA-125 was obtained and was elevated at 2927. The patient was referred for further evaluation and treatment recommendations. OBSTETRICAL HISTORY:       PAST MEDICAL HISTORY:   has a past medical history of Arthritis; BRCA2 genetic carrier; Cancer ([address] GERD (gastroesophageal reflux disease); Hypertension; and Wears glasses.     PAST SURGICAL HISTORY:   has a past surgical history that includes Colonoscopy; Upper gastrointestinal endoscopy (07/2016); Mastectomy (Left, 1982); Mastectomy (Right, 2006); and Cataract removal (Bilateral). ALLERGIES:  Allergies as of 01/29/2018 - Review Complete 01/25/2018   Allergen Reaction Noted    Amoxicillin  06/14/2017       MEDICATIONS:  Current Facility-Administered Medications   Medication Dose Route Frequency Provider Last Rate Last Dose    lactated ringers infusion 1,000 mL  1,000 mL Intravenous Continuous Kaia Brunner MD        0.9 % sodium chloride infusion   Intravenous Continuous Elizabeth Rasmussen MD        ceFAZolin (ANCEF) 2 g in sterile water 20 mL IV syringe  2 g Intravenous On Call to [address] [place] [address], MD        sodium chloride flush 0.9 % injection 10 mL  10 mL Intravenous 2 times per day Elizabeth Rasmussen MD        sodium chloride flush 0.9 % injection 10 mL  10 mL Intravenous PRN Elizabeth Rasmussen MD           FAMILY HISTORY:  family history includes BRCA 1/2 in her sister; Breast Cancer in her daughter, mother, sister, and another family member; Endometrial Cancer in her sister; Hypertension in her sister and another family member; Osteoarthritis in an other family member; Other in her father and mother; Stroke in her sister. SOCIAL HISTORY:   reports that she has never smoked. She has never used smokeless tobacco. She reports that she drinks about 0.6 oz of alcohol per week . She reports that she does not use drugs.     VITALS:  Vitals:    02/07/18 0903 02/07/18 0918   BP:  (!) 126/58   Pulse:  63   Temp:  97.2 °F (36.2 °C)   TempSrc:  Oral   SpO2:  100%   Weight: 138 lb 0.1 oz (62.6 kg)    Height: 5' 3\" (1.6 m)                                                                                                                                PHYSICAL EXAM:     Unchanged from Prior H&P    LAB RESULTS:  Hospital Outpatient Visit on 01/31/2018   Component Date Value Ref Range Status    Color, UA 01/31/2018 YELLOW  YEL Final    Turbidity UA 01/31/2018 CLEAR  CLEAR Final    Glucose, Ur 01/31/2018 NEGATIVE  NEG Final    Bilirubin Urine 01/31/2018 NEGATIVE  NEG Final    Ketones, Urine 01/31/2018 NEGATIVE  NEG Final    Specific Gravity, UA 01/31/2018 1.005  1.005 - 1.030 Final    Urine Hgb 01/31/2018 NEGATIVE  NEG Final    pH, UA 01/31/2018 5.0  5.0 - 8.0 Final    Protein, UA 01/31/2018 NEGATIVE  NEG Final    Urobilinogen, Urine 01/31/2018 Normal  NORM Final    Nitrite, Urine 01/31/2018 NEGATIVE  NEG Final    Leukocyte Esterase, Urine 01/31/2018 NEGATIVE  NEG Final    Urinalysis Comments 01/31/2018 Microscopic exam not performed based on chemical results unless requested in   Final    Comment:  original order.   [address] [facility], [address] [place] [address] [phone]      PTT 01/31/2018 24.7  21.3 - 31.3 sec Final    WBC 01/31/2018 11.0  3.5 - 11.3 k/uL Final    RBC 01/31/2018 4.61  3.95 - 5.11 m/uL Final    Hemoglobin 01/31/2018 12.8  11.9 - 15.1 g/dL Final    Hematocrit 01/31/2018 41.1  36.3 - 47.1 % Final    MCV 01/31/2018 89.2  82.6 - 102.9 fL Final    MCH 01/31/2018 27.8  25.2 - 33.5 pg Final    MCHC 01/31/2018 31.1  28.4 - 34.8 g/dL Final    RDW 01/31/2018 13.4  11.8 - 14.4 % Final    Platelets 44/98/8963 305  138 - 453 k/uL Final    MPV 01/31/2018 12.1  8.1 - 13.5 fL Final    NRBC Automated 01/31/2018 0.0  0.0 per 100 WBC Final    Differential Type 01/31/2018 NOT REPORTED   Final    Seg Neutrophils 01/31/2018 70* 36 - 65 % Final    Lymphocytes 01/31/2018 15* 24 - 43 % Final    Monocytes 01/31/2018 11  3 - 12 % Final    Eosinophils % 01/31/2018 2  1 - 4 % Final    Basophils 01/31/2018 1  0 - 2 % Final    Immature Granulocytes 01/31/2018 1* 0 % Final    Segs Absolute 01/31/2018 7.81  1.50 - 8.10 k/uL Final    Absolute Lymph # 01/31/2018 1.62  1.10 - 3.70 k/uL Final    Absolute Mono # 01/31/2018 1.24* 0.10 - 1.20 k/uL Final  Absolute Eos # 01/31/2018 0.21  0.00 - 0.44 k/uL Final    Basophils # 01/31/2018 0.05  0.00 - 0.20 k/uL Final    Absolute Immature Granulocyte 01/31/2018 0.06  0.00 - 0.30 k/uL Final    WBC Morphology 01/31/2018 NOT REPORTED   Final    RBC Morphology 01/31/2018 NOT REPORTED   Final    Platelet Estimate 08/25/5508 NOT REPORTED   Final    Protime 01/31/2018 10.0  9.4 - 12.6 sec Final    INR 01/31/2018 0.9   Final    Comment:       Therapeutic Range: Moderate Anticoagulant Intensity:     INR = 2.0-3.0   High Anticoagulant Intensity:     INR = 2.5-3.5        [facility] [address], [address] [place] [address] [phone]      Expiration Date 01/31/2018 02/10/2018   Final    Arm Band Number 01/31/2018 [id number]   Final    ABO/Rh 01/31/2018 O POSITIVE   Final    Antibody Screen 01/31/2018 NEGATIVE   Final    Ventricular Rate 01/31/2018 64  BPM Final    Atrial Rate 01/31/2018 64  BPM Final    P-R Interval 01/31/2018 150  ms Final    QRS Duration 01/31/2018 84  ms Final    Q-T Interval 01/31/2018 388  ms Final    QTc Calculation (Bazett) 01/31/2018 400  ms Final    P Axis 01/31/2018 73  degrees Final    R Axis 01/31/2018 48  degrees Final    T Axis 01/31/2018 28  degrees Final    BUN 01/31/2018 18  8 - 23 mg/dL Final    CREATININE 01/31/2018 0.84  0.50 - 0.90 mg/dL Final    GFR Non- 01/31/2018 >60  >60 mL/min Final    GFR  01/31/2018 >60  >60 mL/min Final    GFR Comment 01/31/2018        Final    Comment: Average GFR for 79or more years old:   76 mL/min/1.73sq m  Chronic Kidney Disease:   <60 mL/min/1.73sq m  Kidney failure:   <15 mL/min/1.73sq m              eGFR calculated using average adult body mass.  Additional eGFR calculator   available at:        [employer]        [facility] [address], [address] [place] [address] [phone]      GFR Staging 01/31/2018 NOT REPORTED   Final    Sodium 01/31/2018 131* 135 - 144 01/22/2018 364  130 - 400 thou/mm3 Final    MPV 01/22/2018 9.2  7.4 - 11.0 fl Final    Differential 01/22/2018 AUTOMATED DIFF   Final    Neutrophils 01/22/2018 7.88  2.00 - 8.1 thou/mm3 Final    Lymphocytes 01/22/2018 2.43  1.00 - 5.5 thou/mm3 Final    Monocytes 01/22/2018 1.02* 0.10 - 1.0 thou/mm3 Final    Eosinophils 01/22/2018 0.55  0.00 - 1.1 thou/mm3 Final    Basophils 01/22/2018 0.15  0.00 - 0.3 thou/mm3 Final    Glucose 01/22/2018 111* 65 - 105 mg/dL Final    Comment:         BUN 01/22/2018 16  7 - 17 mg/dL Final    CREATININE 01/22/2018 0.8  0.5 - 1.0 mg/dL Final    Sodium 01/22/2018 133* 135 - 145 mmol/L Final    Potassium 01/22/2018 4.2  3.6 - 5.0 mmol/L Final    Chloride 01/22/2018 101  98 - 120 mmol/L Final    CO2 01/22/2018 23  22 - 31 mmol/L Final    Anion Gap 01/22/2018 13  mmol/L Final    Calcium 01/22/2018 8.7  8.4 - 10.2 mg/dL Final    Total Protein 01/22/2018 7.8  6.3 - 8.2 g/dL Final    Albumin 01/22/2018 3.4* 3.5 - 5.0 g/dL Final    Globulin 01/22/2018 4.4  g/dL Final    Albumin/Globulin Ratio 01/22/2018 0.8  ratio Final    Total Bilirubin 01/22/2018 0.3  0.2 - 1.3 mg/dL Final    AST 01/22/2018 35  14 - 36 units/L Final    ALT 01/22/2018 34  9 - 52 units/L Final    Alk Phosphatase 01/22/2018 83  38 - 126 units/L Final    Age 01/22/2018 86   Final    eGFR BF 01/22/2018 82  mL/min/1.73 m2 Final    eGFR WF 01/22/2018 68  mL/min/1.73 m2 Final    eGFR BM 01/22/2018 111  mL/min/1.73 m2 Final    eGFR WM 01/22/2018 92  mL/min/1.73 m2 Final    LD 01/22/2018 1022* 313 - 618 units/L Final    Comment:          01/22/2018 Sent to reference lab. See separate report. Final    Comment:         Miscellaneous Lab Test Result 01/22/2018 Sent To Reference Lab - See Separate Report   Final    Comment:         Miscellaneous Lab Test Result 01/22/2018 Sent To Reference Lab - See Separate Report   Final    Comment:         Body Fluid Type 01/22/2018 Sent to Reference Lab.   See OF EXAM:  01/11/2018 EXAM:  Ultrasound of the abdomen TECHNIQUE/VIEWS:  Abdominal ultrasound CLINICAL HISTORY:  80-year-old female complaining of abdominal distention COMPARISON:  No relevant prior studies available. FINDINGS:  There are no focal lesions of the liver or spleen. There is no hepatosplenomegaly. There is no intra- or extrahepatic biliary ductal dilatation. The common bile duct and gallbladder are unremarkable. The right kidney is 9.3 cm in length. The left kidney is 9.2 cm in length. There is a 4 cm cyst along the inferior pole of the left kidney. No right renal masses. Satisfactory vascular flow to the kidneys. There are small, bilateral pleural effusions and there is mild abdominal ascites. The pancreas is within normal limits. No abnormality visualized of the aorta or inferior vena cava. In the urinary bladder, bilateral urine jets were evident. There is a large heterogeneous right adnexal mass measuring at least 16 x 6 x 12 cm. IMPRESSION: 1. Small pleural effusions. 2.  Mild abdominal ascites. 3.  Huge heterogeneous right adnexal mass. DATE DICTATED:  01/11/2018 Electronically Signed On: 01/12/2018 09:25 By: Basim Escamilla DO    Us Pelvis Complete    Result Date: 1/12/2018  DATE OF EXAM:  01/11/2018 EXAM:  Ultrasound of the pelvis CLINICAL HISTORY:  80-year-old female complaining of abdominal distention TECHNIQUE/VIEWS:  Pelvic ultrasound COMPARISON:  No relevant prior studies available FINDINGS:  Uterus and Cervix:  The uterus is anteverted and measures 7.1 cm in length. There are calcifications within the myometrium. No cervical masses. Endometrium:  The endometrial thickness is 0.9 cm. Ovaries and Adnexa: The left ovary is atrophic measuring 1 cm greatest dimension with no ovarian masses. The right ovary is markedly enlarged measuring 8.8 x 7.3 x 7.4 cm. There is vascular flow throughout the ovarian mass. IMPRESSION:  1.   Large right ovarian/adnexal mass, suspicious for a pneumothorax. When compared to the prior study of 01/22/2018, there has been improvement in the right pleural effusion following thoracentesis. Following a brief recovery, the patient  was released. DATE DICTATED:  01/29/2018 Electronically Signed On: 01/29/2018 15:57 By: Katie Damon DO    Us Thoracentesis    Result Date: 1/22/2018  DATE OF EXAM:  01/22/2018 EXAM:  Ultrasound guided thoracentesis TECHNIQUE/VIEWS:  Thoracentesis CLINICAL HISTORY:  80-year-old female with a right pleural effusion COMPARISON:  No relevant prior studies available. FINDINGS:  Thoracentesis was performed in the Department of Radiology utilizing ultrasound for catheter placement. The site was marked on the posterior right chest wall and approximately 15 mL of Lidocaine was utilized for skin anesthesia. A thoracentesis catheter was subsequently inserted and approximately 1500 mL of medium colored clear fluid was aspirated. There were no complications. The patient tolerated the procedure well. Postprocedural chest imaging demonstrated improvement with residual fluid still evident in the right pleural space. There was no evidence of a pneumothorax. The patient was returned to the room in satisfactory condition. DATE DICTATED:  01/22/2018 Electronically Signed On: 01/22/2018 15:09 By: aKtie Damon DO    Ct Abdomen Pelvis W Contrast    Result Date: 1/22/2018  DATE OF EXAM:  01/22/2018 EXAM:  CT of the abdomen and pelvis TECHNIQUE/VIEWS:  Axial images obtained along with multiplanar reconstructed images from the 3D data set that includes coronal and sagittal views. Automatic exposure control techniques were used to obtain the study. CT of the abdomen and pelvis with intravenous and oral contrast.  CLINICAL HISTORY:  80-year-old female with right pleural effusion, pelvic mass COMPARISON:  No relevant prior studies available.  FINDINGS: LOWER THORAX:    Lung Bases:  Large right pleural effusion with atelectasis of the right lower lobe.     ABDOMEN:   Liver and Spleen:  Unremarkable. No focal lesions. Gallbladder and Bile Ducts:  No calculi, polyps or wall thickening. Pancreas:  Unremarkable. No masses. No evidence of pancreatitis. Kidneys and Adrenal Glands:  Left 4.2 cm lobulated renal cyst.  No obstructive uropathy. No hydronephrosis. No adrenal masses. Aorta and IVC:  Arteriosclerosis. No aneurysm. Retroperitoneum and Mesentery:  There is mild ascites, especially surrounding the liver and spleen. No adenopathy. Gastrointestinal:  Mild ileus with fluid-filled small bowel. No bowel obstruction. No inflammatory process. PELVIS:   Bladder:  Unremarkable. Reproductive: There is a large, complex, cystic, irregular enhancing pelvic mass with smooth margins. This mass subtends but does not appear to be arising from the uterus. It is most likely ovarian. This mass measures 17 x 12 cm. Musculoskeletal:  Degenerative changes throughout the lumbosacral spine. IMPRESSION: 1. Large heterogeneous enhancing mass in the pelvis, most likely ovarian. 2.  Mild ascites. 3.  Simple lobulated left renal cyst.  4.  Mild ileus. 5.  Right pleural effusion. DATE DICTATED:  01/22/2018 Electronically Signed On: 01/22/2018 15:09 By: Katie Damon DO      DIAGNOSIS & PLAN:  1. Pelvic Mass   - Proceed with planned procedure: SHADI, BSO, Frozen section, possible tumor debulking, staging   - Consent signed, on chart. - The patient is ready for transport to the operative suite. Vicky Corona DO  Ob/Gyn Resident   [facility]  2/7/2018, 9:35 AM     Pt seen and examined   Reviewed chart, vitals and labs  Agree with above exam, assessment and treatment plan. Proceed with SHADI/BSO FS possible staging/debulking. The patient was counseled on the risks, benefits and alternatives of the procedure to include but not limited to:   Infection, bleeding, blood transfusion, damage to internal organs such

## 2018-02-05 ENCOUNTER — TELEPHONE (OUTPATIENT)
Dept: FAMILY MEDICINE CLINIC | Age: 83
End: 2018-02-05

## 2018-02-05 DIAGNOSIS — J90 PLEURAL EFFUSION, RIGHT: Primary | ICD-10-CM

## 2018-02-07 ENCOUNTER — ANESTHESIA EVENT (OUTPATIENT)
Dept: OPERATING ROOM | Age: 83
DRG: 737 | End: 2018-02-07
Payer: MEDICARE

## 2018-02-07 ENCOUNTER — ANESTHESIA (OUTPATIENT)
Dept: OPERATING ROOM | Age: 83
DRG: 737 | End: 2018-02-07
Payer: MEDICARE

## 2018-02-07 ENCOUNTER — HOSPITAL ENCOUNTER (INPATIENT)
Age: 83
LOS: 4 days | Discharge: HOME | DRG: 737 | End: 2018-02-11
Attending: OBSTETRICS & GYNECOLOGY | Admitting: OBSTETRICS & GYNECOLOGY
Payer: MEDICARE

## 2018-02-07 VITALS — SYSTOLIC BLOOD PRESSURE: 103 MMHG | DIASTOLIC BLOOD PRESSURE: 42 MMHG | OXYGEN SATURATION: 98 % | TEMPERATURE: 96 F

## 2018-02-07 DIAGNOSIS — Z98.890 POST-OPERATIVE STATE: Primary | ICD-10-CM

## 2018-02-07 LAB
CASE NUMBER:: NORMAL
SPECIMEN DESCRIPTION: NORMAL

## 2018-02-07 PROCEDURE — 44955 APPENDECTOMY ADD-ON: CPT | Performed by: OBSTETRICS & GYNECOLOGY

## 2018-02-07 PROCEDURE — 6360000002 HC RX W HCPCS: Performed by: OBSTETRICS & GYNECOLOGY

## 2018-02-07 PROCEDURE — 3700000001 HC ADD 15 MINUTES (ANESTHESIA): Performed by: OBSTETRICS & GYNECOLOGY

## 2018-02-07 PROCEDURE — 88305 TISSUE EXAM BY PATHOLOGIST: CPT

## 2018-02-07 PROCEDURE — C1765 ADHESION BARRIER: HCPCS | Performed by: OBSTETRICS & GYNECOLOGY

## 2018-02-07 PROCEDURE — 2580000003 HC RX 258: Performed by: ANESTHESIOLOGY

## 2018-02-07 PROCEDURE — 88360 TUMOR IMMUNOHISTOCHEM/MANUAL: CPT

## 2018-02-07 PROCEDURE — 88332 PATH CONSLTJ SURG EA ADD BLK: CPT

## 2018-02-07 PROCEDURE — 0UT20ZZ RESECTION OF BILATERAL OVARIES, OPEN APPROACH: ICD-10-PCS | Performed by: OBSTETRICS & GYNECOLOGY

## 2018-02-07 PROCEDURE — 0DTJ0ZZ RESECTION OF APPENDIX, OPEN APPROACH: ICD-10-PCS | Performed by: OBSTETRICS & GYNECOLOGY

## 2018-02-07 PROCEDURE — 0UT70ZZ RESECTION OF BILATERAL FALLOPIAN TUBES, OPEN APPROACH: ICD-10-PCS | Performed by: OBSTETRICS & GYNECOLOGY

## 2018-02-07 PROCEDURE — 6360000002 HC RX W HCPCS: Performed by: ANESTHESIOLOGY

## 2018-02-07 PROCEDURE — P9045 ALBUMIN (HUMAN), 5%, 250 ML: HCPCS | Performed by: NURSE ANESTHETIST, CERTIFIED REGISTERED

## 2018-02-07 PROCEDURE — 88309 TISSUE EXAM BY PATHOLOGIST: CPT

## 2018-02-07 PROCEDURE — 88304 TISSUE EXAM BY PATHOLOGIST: CPT

## 2018-02-07 PROCEDURE — 1200000000 HC SEMI PRIVATE

## 2018-02-07 PROCEDURE — 0UT90ZZ RESECTION OF UTERUS, OPEN APPROACH: ICD-10-PCS | Performed by: OBSTETRICS & GYNECOLOGY

## 2018-02-07 PROCEDURE — 58953 TAH RAD DISSECT FOR DEBULK: CPT | Performed by: OBSTETRICS & GYNECOLOGY

## 2018-02-07 PROCEDURE — 3600000014 HC SURGERY LEVEL 4 ADDTL 15MIN: Performed by: OBSTETRICS & GYNECOLOGY

## 2018-02-07 PROCEDURE — 6360000002 HC RX W HCPCS: Performed by: NURSE ANESTHETIST, CERTIFIED REGISTERED

## 2018-02-07 PROCEDURE — 3E0T3BZ INTRODUCTION OF ANESTHETIC AGENT INTO PERIPHERAL NERVES AND PLEXI, PERCUTANEOUS APPROACH: ICD-10-PCS | Performed by: ANESTHESIOLOGY

## 2018-02-07 PROCEDURE — C1729 CATH, DRAINAGE: HCPCS | Performed by: OBSTETRICS & GYNECOLOGY

## 2018-02-07 PROCEDURE — 2500000003 HC RX 250 WO HCPCS: Performed by: NURSE ANESTHETIST, CERTIFIED REGISTERED

## 2018-02-07 PROCEDURE — 88307 TISSUE EXAM BY PATHOLOGIST: CPT

## 2018-02-07 PROCEDURE — 2500000003 HC RX 250 WO HCPCS: Performed by: OBSTETRICS & GYNECOLOGY

## 2018-02-07 PROCEDURE — 88342 IMHCHEM/IMCYTCHM 1ST ANTB: CPT

## 2018-02-07 PROCEDURE — 07BC0ZX EXCISION OF PELVIS LYMPHATIC, OPEN APPROACH, DIAGNOSTIC: ICD-10-PCS | Performed by: OBSTETRICS & GYNECOLOGY

## 2018-02-07 PROCEDURE — 2580000003 HC RX 258: Performed by: OBSTETRICS & GYNECOLOGY

## 2018-02-07 PROCEDURE — S0028 INJECTION, FAMOTIDINE, 20 MG: HCPCS | Performed by: OBSTETRICS & GYNECOLOGY

## 2018-02-07 PROCEDURE — 2720000010 HC SURG SUPPLY STERILE: Performed by: OBSTETRICS & GYNECOLOGY

## 2018-02-07 PROCEDURE — A6403 STERILE GAUZE>16 <= 48 SQ IN: HCPCS | Performed by: OBSTETRICS & GYNECOLOGY

## 2018-02-07 PROCEDURE — 6370000000 HC RX 637 (ALT 250 FOR IP): Performed by: OBSTETRICS & GYNECOLOGY

## 2018-02-07 PROCEDURE — 0DBW0ZZ EXCISION OF PERITONEUM, OPEN APPROACH: ICD-10-PCS | Performed by: OBSTETRICS & GYNECOLOGY

## 2018-02-07 PROCEDURE — L0450 TLSO FLEX TRUNK/THOR PRE OTS: HCPCS | Performed by: OBSTETRICS & GYNECOLOGY

## 2018-02-07 PROCEDURE — 3600000004 HC SURGERY LEVEL 4 BASE: Performed by: OBSTETRICS & GYNECOLOGY

## 2018-02-07 PROCEDURE — 88341 IMHCHEM/IMCYTCHM EA ADD ANTB: CPT

## 2018-02-07 PROCEDURE — 0W9G0ZX DRAINAGE OF PERITONEAL CAVITY, OPEN APPROACH, DIAGNOSTIC: ICD-10-PCS | Performed by: OBSTETRICS & GYNECOLOGY

## 2018-02-07 PROCEDURE — 07BB0ZX EXCISION OF MESENTERIC LYMPHATIC, OPEN APPROACH, DIAGNOSTIC: ICD-10-PCS | Performed by: OBSTETRICS & GYNECOLOGY

## 2018-02-07 PROCEDURE — 0DBU0ZZ EXCISION OF OMENTUM, OPEN APPROACH: ICD-10-PCS | Performed by: OBSTETRICS & GYNECOLOGY

## 2018-02-07 PROCEDURE — 3E0M05Z INTRODUCTION OF ADHESION BARRIER INTO PERITONEAL CAVITY, OPEN APPROACH: ICD-10-PCS | Performed by: OBSTETRICS & GYNECOLOGY

## 2018-02-07 PROCEDURE — 3700000000 HC ANESTHESIA ATTENDED CARE: Performed by: OBSTETRICS & GYNECOLOGY

## 2018-02-07 PROCEDURE — 94770 HC ETCO2 MONITOR DAILY: CPT

## 2018-02-07 PROCEDURE — 87086 URINE CULTURE/COLONY COUNT: CPT

## 2018-02-07 PROCEDURE — 7100000000 HC PACU RECOVERY - FIRST 15 MIN: Performed by: OBSTETRICS & GYNECOLOGY

## 2018-02-07 PROCEDURE — 7100000001 HC PACU RECOVERY - ADDTL 15 MIN: Performed by: OBSTETRICS & GYNECOLOGY

## 2018-02-07 PROCEDURE — 88331 PATH CONSLTJ SURG 1 BLK 1SPC: CPT

## 2018-02-07 PROCEDURE — 88112 CYTOPATH CELL ENHANCE TECH: CPT

## 2018-02-07 RX ORDER — ROPIVACAINE HYDROCHLORIDE 5 MG/ML
40 INJECTION, SOLUTION EPIDURAL; INFILTRATION; PERINEURAL ONCE
Status: COMPLETED | OUTPATIENT
Start: 2018-02-07 | End: 2018-02-07

## 2018-02-07 RX ORDER — DEXAMETHASONE SODIUM PHOSPHATE 10 MG/ML
INJECTION INTRAMUSCULAR; INTRAVENOUS PRN
Status: DISCONTINUED | OUTPATIENT
Start: 2018-02-07 | End: 2018-02-07 | Stop reason: SDUPTHER

## 2018-02-07 RX ORDER — MEPERIDINE HYDROCHLORIDE 50 MG/ML
12.5 INJECTION INTRAMUSCULAR; INTRAVENOUS; SUBCUTANEOUS EVERY 5 MIN PRN
Status: DISCONTINUED | OUTPATIENT
Start: 2018-02-07 | End: 2018-02-07 | Stop reason: HOSPADM

## 2018-02-07 RX ORDER — GLYCOPYRROLATE 0.2 MG/ML
INJECTION INTRAMUSCULAR; INTRAVENOUS PRN
Status: DISCONTINUED | OUTPATIENT
Start: 2018-02-07 | End: 2018-02-07 | Stop reason: SDUPTHER

## 2018-02-07 RX ORDER — DIPHENHYDRAMINE HYDROCHLORIDE 50 MG/ML
25 INJECTION INTRAMUSCULAR; INTRAVENOUS EVERY 6 HOURS PRN
Status: DISCONTINUED | OUTPATIENT
Start: 2018-02-07 | End: 2018-02-12 | Stop reason: HOSPADM

## 2018-02-07 RX ORDER — ONDANSETRON 2 MG/ML
4 INJECTION INTRAMUSCULAR; INTRAVENOUS
Status: DISCONTINUED | OUTPATIENT
Start: 2018-02-07 | End: 2018-02-07 | Stop reason: HOSPADM

## 2018-02-07 RX ORDER — FENTANYL CITRATE 50 UG/ML
25 INJECTION, SOLUTION INTRAMUSCULAR; INTRAVENOUS EVERY 5 MIN PRN
Status: DISCONTINUED | OUTPATIENT
Start: 2018-02-07 | End: 2018-02-07 | Stop reason: HOSPADM

## 2018-02-07 RX ORDER — SIMETHICONE 80 MG
80 TABLET,CHEWABLE ORAL EVERY 6 HOURS PRN
Status: DISCONTINUED | OUTPATIENT
Start: 2018-02-07 | End: 2018-02-12 | Stop reason: HOSPADM

## 2018-02-07 RX ORDER — MAGNESIUM HYDROXIDE 1200 MG/15ML
LIQUID ORAL CONTINUOUS PRN
Status: DISCONTINUED | OUTPATIENT
Start: 2018-02-07 | End: 2018-02-07 | Stop reason: HOSPADM

## 2018-02-07 RX ORDER — FENTANYL CITRATE 50 UG/ML
50 INJECTION, SOLUTION INTRAMUSCULAR; INTRAVENOUS EVERY 5 MIN PRN
Status: COMPLETED | OUTPATIENT
Start: 2018-02-07 | End: 2018-02-07

## 2018-02-07 RX ORDER — SODIUM CHLORIDE 9 MG/ML
INJECTION, SOLUTION INTRAVENOUS CONTINUOUS
Status: DISCONTINUED | OUTPATIENT
Start: 2018-02-07 | End: 2018-02-07

## 2018-02-07 RX ORDER — ACETAMINOPHEN 10 MG/ML
1000 INJECTION, SOLUTION INTRAVENOUS EVERY 6 HOURS PRN
Status: DISPENSED | OUTPATIENT
Start: 2018-02-07 | End: 2018-02-08

## 2018-02-07 RX ORDER — PROMETHAZINE HYDROCHLORIDE 25 MG/ML
12.5 INJECTION, SOLUTION INTRAMUSCULAR; INTRAVENOUS EVERY 6 HOURS PRN
Status: DISCONTINUED | OUTPATIENT
Start: 2018-02-07 | End: 2018-02-12 | Stop reason: HOSPADM

## 2018-02-07 RX ORDER — ROCURONIUM BROMIDE 10 MG/ML
INJECTION, SOLUTION INTRAVENOUS PRN
Status: DISCONTINUED | OUTPATIENT
Start: 2018-02-07 | End: 2018-02-07 | Stop reason: SDUPTHER

## 2018-02-07 RX ORDER — SODIUM CHLORIDE 0.9 % (FLUSH) 0.9 %
10 SYRINGE (ML) INJECTION PRN
Status: DISCONTINUED | OUTPATIENT
Start: 2018-02-07 | End: 2018-02-07 | Stop reason: HOSPADM

## 2018-02-07 RX ORDER — PROPOFOL 10 MG/ML
INJECTION, EMULSION INTRAVENOUS PRN
Status: DISCONTINUED | OUTPATIENT
Start: 2018-02-07 | End: 2018-02-07 | Stop reason: SDUPTHER

## 2018-02-07 RX ORDER — NALOXONE HYDROCHLORIDE 0.4 MG/ML
0.4 INJECTION, SOLUTION INTRAMUSCULAR; INTRAVENOUS; SUBCUTANEOUS PRN
Status: DISCONTINUED | OUTPATIENT
Start: 2018-02-07 | End: 2018-02-09

## 2018-02-07 RX ORDER — ONDANSETRON 2 MG/ML
4 INJECTION INTRAMUSCULAR; INTRAVENOUS EVERY 8 HOURS PRN
Status: DISCONTINUED | OUTPATIENT
Start: 2018-02-07 | End: 2018-02-12 | Stop reason: HOSPADM

## 2018-02-07 RX ORDER — SODIUM CHLORIDE, SODIUM LACTATE, POTASSIUM CHLORIDE, CALCIUM CHLORIDE 600; 310; 30; 20 MG/100ML; MG/100ML; MG/100ML; MG/100ML
1000 INJECTION, SOLUTION INTRAVENOUS CONTINUOUS
Status: DISCONTINUED | OUTPATIENT
Start: 2018-02-07 | End: 2018-02-07

## 2018-02-07 RX ORDER — SODIUM CHLORIDE, SODIUM LACTATE, POTASSIUM CHLORIDE, AND CALCIUM CHLORIDE .6; .31; .03; .02 G/100ML; G/100ML; G/100ML; G/100ML
500 INJECTION, SOLUTION INTRAVENOUS ONCE
Status: COMPLETED | OUTPATIENT
Start: 2018-02-07 | End: 2018-02-08

## 2018-02-07 RX ORDER — KETOROLAC TROMETHAMINE 30 MG/ML
15 INJECTION, SOLUTION INTRAMUSCULAR; INTRAVENOUS EVERY 6 HOURS
Status: CANCELLED | OUTPATIENT
Start: 2018-02-07 | End: 2018-02-09

## 2018-02-07 RX ORDER — SODIUM CHLORIDE 0.9 % (FLUSH) 0.9 %
10 SYRINGE (ML) INJECTION EVERY 12 HOURS SCHEDULED
Status: DISCONTINUED | OUTPATIENT
Start: 2018-02-07 | End: 2018-02-07 | Stop reason: HOSPADM

## 2018-02-07 RX ORDER — ALBUMIN, HUMAN INJ 5% 5 %
SOLUTION INTRAVENOUS PRN
Status: DISCONTINUED | OUTPATIENT
Start: 2018-02-07 | End: 2018-02-07 | Stop reason: SDUPTHER

## 2018-02-07 RX ORDER — SODIUM CHLORIDE 0.9 % (FLUSH) 0.9 %
10 SYRINGE (ML) INJECTION PRN
Status: DISCONTINUED | OUTPATIENT
Start: 2018-02-07 | End: 2018-02-12 | Stop reason: HOSPADM

## 2018-02-07 RX ORDER — SOFT LENS RINSE,STORE SOLUTION
1 SOLUTION, NON-ORAL MISCELLANEOUS PRN
Status: DISCONTINUED | OUTPATIENT
Start: 2018-02-07 | End: 2018-02-12 | Stop reason: HOSPADM

## 2018-02-07 RX ORDER — DOCUSATE SODIUM 100 MG/1
100 CAPSULE, LIQUID FILLED ORAL 2 TIMES DAILY
Status: DISCONTINUED | OUTPATIENT
Start: 2018-02-07 | End: 2018-02-12 | Stop reason: HOSPADM

## 2018-02-07 RX ORDER — ONDANSETRON 2 MG/ML
INJECTION INTRAMUSCULAR; INTRAVENOUS PRN
Status: DISCONTINUED | OUTPATIENT
Start: 2018-02-07 | End: 2018-02-07 | Stop reason: SDUPTHER

## 2018-02-07 RX ORDER — BISACODYL 10 MG
10 SUPPOSITORY, RECTAL RECTAL DAILY PRN
Status: DISCONTINUED | OUTPATIENT
Start: 2018-02-07 | End: 2018-02-12 | Stop reason: HOSPADM

## 2018-02-07 RX ORDER — FENTANYL CITRATE 50 UG/ML
INJECTION, SOLUTION INTRAMUSCULAR; INTRAVENOUS PRN
Status: DISCONTINUED | OUTPATIENT
Start: 2018-02-07 | End: 2018-02-07 | Stop reason: SDUPTHER

## 2018-02-07 RX ORDER — LIDOCAINE HYDROCHLORIDE 10 MG/ML
INJECTION, SOLUTION EPIDURAL; INFILTRATION; INTRACAUDAL; PERINEURAL PRN
Status: DISCONTINUED | OUTPATIENT
Start: 2018-02-07 | End: 2018-02-07 | Stop reason: SDUPTHER

## 2018-02-07 RX ORDER — SODIUM CHLORIDE, SODIUM LACTATE, POTASSIUM CHLORIDE, CALCIUM CHLORIDE 600; 310; 30; 20 MG/100ML; MG/100ML; MG/100ML; MG/100ML
INJECTION, SOLUTION INTRAVENOUS CONTINUOUS
Status: DISCONTINUED | OUTPATIENT
Start: 2018-02-07 | End: 2018-02-08

## 2018-02-07 RX ADMIN — FENTANYL CITRATE 50 MCG: 50 INJECTION INTRAMUSCULAR; INTRAVENOUS at 10:20

## 2018-02-07 RX ADMIN — PHENYLEPHRINE HYDROCHLORIDE 100 MCG: 10 INJECTION INTRAVENOUS at 12:35

## 2018-02-07 RX ADMIN — FENTANYL CITRATE 25 MCG: 50 INJECTION INTRAMUSCULAR; INTRAVENOUS at 11:08

## 2018-02-07 RX ADMIN — PHENYLEPHRINE HYDROCHLORIDE 200 MCG: 10 INJECTION INTRAVENOUS at 13:21

## 2018-02-07 RX ADMIN — PHENYLEPHRINE HYDROCHLORIDE 200 MCG: 10 INJECTION INTRAVENOUS at 10:26

## 2018-02-07 RX ADMIN — SODIUM CHLORIDE, POTASSIUM CHLORIDE, SODIUM LACTATE AND CALCIUM CHLORIDE: 600; 310; 30; 20 INJECTION, SOLUTION INTRAVENOUS at 11:30

## 2018-02-07 RX ADMIN — Medication: at 15:44

## 2018-02-07 RX ADMIN — ALBUMIN (HUMAN) 250 ML: 12.5 INJECTION, SOLUTION INTRAVENOUS at 13:24

## 2018-02-07 RX ADMIN — ROPIVACAINE HYDROCHLORIDE 40 ML: 5 INJECTION, SOLUTION EPIDURAL; INFILTRATION; PERINEURAL at 14:23

## 2018-02-07 RX ADMIN — FENTANYL CITRATE 50 MCG: 50 INJECTION INTRAMUSCULAR; INTRAVENOUS at 15:00

## 2018-02-07 RX ADMIN — FENTANYL CITRATE 50 MCG: 50 INJECTION INTRAMUSCULAR; INTRAVENOUS at 15:20

## 2018-02-07 RX ADMIN — PHENYLEPHRINE HYDROCHLORIDE 200 MCG: 10 INJECTION INTRAVENOUS at 10:38

## 2018-02-07 RX ADMIN — PHENYLEPHRINE HYDROCHLORIDE 50 MCG: 10 INJECTION INTRAVENOUS at 11:50

## 2018-02-07 RX ADMIN — ONDANSETRON 4 MG: 2 INJECTION INTRAMUSCULAR; INTRAVENOUS at 10:20

## 2018-02-07 RX ADMIN — FENTANYL CITRATE 50 MCG: 50 INJECTION INTRAMUSCULAR; INTRAVENOUS at 15:10

## 2018-02-07 RX ADMIN — SODIUM CHLORIDE, POTASSIUM CHLORIDE, SODIUM LACTATE AND CALCIUM CHLORIDE: 600; 310; 30; 20 INJECTION, SOLUTION INTRAVENOUS at 14:13

## 2018-02-07 RX ADMIN — NEOSTIGMINE METHYLSULFATE 4 MG: 1 INJECTION, SOLUTION INTRAMUSCULAR; INTRAVENOUS; SUBCUTANEOUS at 14:28

## 2018-02-07 RX ADMIN — ROCURONIUM BROMIDE 20 MG: 10 INJECTION INTRAVENOUS at 11:55

## 2018-02-07 RX ADMIN — PROPOFOL 120 MG: 10 INJECTION, EMULSION INTRAVENOUS at 10:20

## 2018-02-07 RX ADMIN — SODIUM CHLORIDE, POTASSIUM CHLORIDE, SODIUM LACTATE AND CALCIUM CHLORIDE 500 ML: 600; 310; 30; 20 INJECTION, SOLUTION INTRAVENOUS at 22:16

## 2018-02-07 RX ADMIN — FAMOTIDINE 20 MG: 10 INJECTION, SOLUTION INTRAVENOUS at 20:59

## 2018-02-07 RX ADMIN — PHENYLEPHRINE HYDROCHLORIDE 100 MCG: 10 INJECTION INTRAVENOUS at 12:50

## 2018-02-07 RX ADMIN — FENTANYL CITRATE 50 MCG: 50 INJECTION INTRAMUSCULAR; INTRAVENOUS at 15:15

## 2018-02-07 RX ADMIN — Medication 2 G: at 13:25

## 2018-02-07 RX ADMIN — Medication 2 G: at 10:30

## 2018-02-07 RX ADMIN — PHENYLEPHRINE HYDROCHLORIDE 200 MCG: 10 INJECTION INTRAVENOUS at 12:58

## 2018-02-07 RX ADMIN — ONDANSETRON 4 MG: 2 INJECTION INTRAMUSCULAR; INTRAVENOUS at 14:08

## 2018-02-07 RX ADMIN — SODIUM CHLORIDE, POTASSIUM CHLORIDE, SODIUM LACTATE AND CALCIUM CHLORIDE 1000 ML: 600; 310; 30; 20 INJECTION, SOLUTION INTRAVENOUS at 09:30

## 2018-02-07 RX ADMIN — ENOXAPARIN SODIUM 40 MG: 40 INJECTION SUBCUTANEOUS at 20:59

## 2018-02-07 RX ADMIN — SODIUM CHLORIDE, POTASSIUM CHLORIDE, SODIUM LACTATE AND CALCIUM CHLORIDE: 600; 310; 30; 20 INJECTION, SOLUTION INTRAVENOUS at 13:12

## 2018-02-07 RX ADMIN — SODIUM CHLORIDE, POTASSIUM CHLORIDE, SODIUM LACTATE AND CALCIUM CHLORIDE: 600; 310; 30; 20 INJECTION, SOLUTION INTRAVENOUS at 19:09

## 2018-02-07 RX ADMIN — GLYCOPYRROLATE 0.8 MG: 0.2 INJECTION INTRAMUSCULAR; INTRAVENOUS at 14:28

## 2018-02-07 RX ADMIN — FENTANYL CITRATE 25 MCG: 50 INJECTION INTRAMUSCULAR; INTRAVENOUS at 13:30

## 2018-02-07 RX ADMIN — ROCURONIUM BROMIDE 50 MG: 10 INJECTION INTRAVENOUS at 10:20

## 2018-02-07 RX ADMIN — SODIUM CHLORIDE, POTASSIUM CHLORIDE, SODIUM LACTATE AND CALCIUM CHLORIDE: 600; 310; 30; 20 INJECTION, SOLUTION INTRAVENOUS at 17:03

## 2018-02-07 RX ADMIN — DEXAMETHASONE SODIUM PHOSPHATE 5 MG: 10 INJECTION INTRAMUSCULAR; INTRAVENOUS at 10:33

## 2018-02-07 RX ADMIN — LIDOCAINE HYDROCHLORIDE 50 MG: 10 INJECTION, SOLUTION EPIDURAL; INFILTRATION; INTRACAUDAL; PERINEURAL at 10:20

## 2018-02-07 RX ADMIN — PHENYLEPHRINE HYDROCHLORIDE 50 MCG: 10 INJECTION INTRAVENOUS at 11:30

## 2018-02-07 RX ADMIN — ROCURONIUM BROMIDE 20 MG: 10 INJECTION INTRAVENOUS at 12:50

## 2018-02-07 RX ADMIN — DOCUSATE SODIUM 100 MG: 100 CAPSULE ORAL at 20:59

## 2018-02-07 RX ADMIN — ACETAMINOPHEN 1000 MG: 10 INJECTION, SOLUTION INTRAVENOUS at 19:09

## 2018-02-07 ASSESSMENT — PULMONARY FUNCTION TESTS
PIF_VALUE: 18
PIF_VALUE: 17
PIF_VALUE: 16
PIF_VALUE: 17
PIF_VALUE: 18
PIF_VALUE: 20
PIF_VALUE: 18
PIF_VALUE: 17
PIF_VALUE: 14
PIF_VALUE: 18
PIF_VALUE: 17
PIF_VALUE: 16
PIF_VALUE: 18
PIF_VALUE: 14
PIF_VALUE: 18
PIF_VALUE: 18
PIF_VALUE: 17
PIF_VALUE: 0
PIF_VALUE: 19
PIF_VALUE: 14
PIF_VALUE: 15
PIF_VALUE: 19
PIF_VALUE: 15
PIF_VALUE: 19
PIF_VALUE: 16
PIF_VALUE: 18
PIF_VALUE: 18
PIF_VALUE: 16
PIF_VALUE: 18
PIF_VALUE: 18
PIF_VALUE: 17
PIF_VALUE: 17
PIF_VALUE: 3
PIF_VALUE: 16
PIF_VALUE: 16
PIF_VALUE: 19
PIF_VALUE: 18
PIF_VALUE: 1
PIF_VALUE: 17
PIF_VALUE: 12
PIF_VALUE: 18
PIF_VALUE: 17
PIF_VALUE: 18
PIF_VALUE: 17
PIF_VALUE: 17
PIF_VALUE: 18
PIF_VALUE: 19
PIF_VALUE: 16
PIF_VALUE: 18
PIF_VALUE: 16
PIF_VALUE: 15
PIF_VALUE: 1
PIF_VALUE: 16
PIF_VALUE: 18
PIF_VALUE: 20
PIF_VALUE: 21
PIF_VALUE: 17
PIF_VALUE: 20
PIF_VALUE: 18
PIF_VALUE: 16
PIF_VALUE: 16
PIF_VALUE: 19
PIF_VALUE: 18
PIF_VALUE: 15
PIF_VALUE: 17
PIF_VALUE: 16
PIF_VALUE: 15
PIF_VALUE: 17
PIF_VALUE: 18
PIF_VALUE: 18
PIF_VALUE: 15
PIF_VALUE: 2
PIF_VALUE: 14
PIF_VALUE: 18
PIF_VALUE: 17
PIF_VALUE: 17
PIF_VALUE: 18
PIF_VALUE: 15
PIF_VALUE: 17
PIF_VALUE: 15
PIF_VALUE: 18
PIF_VALUE: 18
PIF_VALUE: 17
PIF_VALUE: 18
PIF_VALUE: 23
PIF_VALUE: 18
PIF_VALUE: 15
PIF_VALUE: 15
PIF_VALUE: 18
PIF_VALUE: 18
PIF_VALUE: 20
PIF_VALUE: 20
PIF_VALUE: 18
PIF_VALUE: 16
PIF_VALUE: 20
PIF_VALUE: 17
PIF_VALUE: 3
PIF_VALUE: 19
PIF_VALUE: 17
PIF_VALUE: 15
PIF_VALUE: 17
PIF_VALUE: 15
PIF_VALUE: 18
PIF_VALUE: 18
PIF_VALUE: 16
PIF_VALUE: 14
PIF_VALUE: 15
PIF_VALUE: 18
PIF_VALUE: 15
PIF_VALUE: 19
PIF_VALUE: 17
PIF_VALUE: 19
PIF_VALUE: 18
PIF_VALUE: 15
PIF_VALUE: 16
PIF_VALUE: 19
PIF_VALUE: 17
PIF_VALUE: 17
PIF_VALUE: 15
PIF_VALUE: 19
PIF_VALUE: 15
PIF_VALUE: 17
PIF_VALUE: 20
PIF_VALUE: 16
PIF_VALUE: 19
PIF_VALUE: 18
PIF_VALUE: 19
PIF_VALUE: 15
PIF_VALUE: 18
PIF_VALUE: 20
PIF_VALUE: 18
PIF_VALUE: 17
PIF_VALUE: 15
PIF_VALUE: 19
PIF_VALUE: 18
PIF_VALUE: 15
PIF_VALUE: 18
PIF_VALUE: 17
PIF_VALUE: 19
PIF_VALUE: 17
PIF_VALUE: 19
PIF_VALUE: 17
PIF_VALUE: 19
PIF_VALUE: 19
PIF_VALUE: 18
PIF_VALUE: 15
PIF_VALUE: 17
PIF_VALUE: 18
PIF_VALUE: 15
PIF_VALUE: 17
PIF_VALUE: 14
PIF_VALUE: 19
PIF_VALUE: 20
PIF_VALUE: 18
PIF_VALUE: 16
PIF_VALUE: 22
PIF_VALUE: 18
PIF_VALUE: 18
PIF_VALUE: 17
PIF_VALUE: 20
PIF_VALUE: 17
PIF_VALUE: 19
PIF_VALUE: 3
PIF_VALUE: 17
PIF_VALUE: 17
PIF_VALUE: 15
PIF_VALUE: 17
PIF_VALUE: 19
PIF_VALUE: 16
PIF_VALUE: 18
PIF_VALUE: 19
PIF_VALUE: 18
PIF_VALUE: 15
PIF_VALUE: 18
PIF_VALUE: 18
PIF_VALUE: 17
PIF_VALUE: 22
PIF_VALUE: 13
PIF_VALUE: 19
PIF_VALUE: 18
PIF_VALUE: 1
PIF_VALUE: 18
PIF_VALUE: 16
PIF_VALUE: 18
PIF_VALUE: 18
PIF_VALUE: 16
PIF_VALUE: 17
PIF_VALUE: 19
PIF_VALUE: 18
PIF_VALUE: 15
PIF_VALUE: 18
PIF_VALUE: 19
PIF_VALUE: 20
PIF_VALUE: 18
PIF_VALUE: 15
PIF_VALUE: 18
PIF_VALUE: 17
PIF_VALUE: 18
PIF_VALUE: 16
PIF_VALUE: 16
PIF_VALUE: 19
PIF_VALUE: 17
PIF_VALUE: 19
PIF_VALUE: 18
PIF_VALUE: 18
PIF_VALUE: 20
PIF_VALUE: 18
PIF_VALUE: 17
PIF_VALUE: 20
PIF_VALUE: 18
PIF_VALUE: 15
PIF_VALUE: 19
PIF_VALUE: 3
PIF_VALUE: 17
PIF_VALUE: 17
PIF_VALUE: 18
PIF_VALUE: 15
PIF_VALUE: 18
PIF_VALUE: 17
PIF_VALUE: 20
PIF_VALUE: 15
PIF_VALUE: 17
PIF_VALUE: 18
PIF_VALUE: 18
PIF_VALUE: 17
PIF_VALUE: 20
PIF_VALUE: 18
PIF_VALUE: 18
PIF_VALUE: 19
PIF_VALUE: 18
PIF_VALUE: 20
PIF_VALUE: 19
PIF_VALUE: 16
PIF_VALUE: 18
PIF_VALUE: 18
PIF_VALUE: 19
PIF_VALUE: 18
PIF_VALUE: 17

## 2018-02-07 ASSESSMENT — PAIN SCALES - GENERAL
PAINLEVEL_OUTOF10: 8
PAINLEVEL_OUTOF10: 6
PAINLEVEL_OUTOF10: 0
PAINLEVEL_OUTOF10: 5
PAINLEVEL_OUTOF10: 8
PAINLEVEL_OUTOF10: 5

## 2018-02-07 ASSESSMENT — PAIN - FUNCTIONAL ASSESSMENT: PAIN_FUNCTIONAL_ASSESSMENT: 0-10

## 2018-02-07 ASSESSMENT — PAIN DESCRIPTION - PAIN TYPE: TYPE: SURGICAL PAIN

## 2018-02-07 NOTE — DISCHARGE SUMMARY
Follow-up care, restrictions reviewed.     Katia eGe DO  Ob/Gyn Resident  [facility]  2/11/2018, 9:43 PM

## 2018-02-07 NOTE — PROGRESS NOTES
Progress Note    Date: 2/7/2018  Time: 5:55 PM    Celena Anderson 80 y.o. female POD # 0    Patient seen and examined. She complained of abdominal pain but states it is controlled. Has been using her PCA as needed with improvement in pain. Patient is  tolerating oral intake of clear fluids. She has melchor catheter in place with 30 ml of concentrated appearing urine. Per RN it was emptied when she left PACU approximately an hour and a half ago. She denies any vaginal bleeding. She has not yet ambulated. She is not passing flatus. She denies Fever/Chills, Chest Pain, SOB, N/V, Calf Pain. Vitals:  Vitals:    02/07/18 1545 02/07/18 1600 02/07/18 1615 02/07/18 1633   BP: (!) 109/42 (!) 107/40 (!) 114/39 (!) 107/46   Pulse: 57 60 63 60   Resp: 18 15 20 17   Temp:  97.2 °F (36.2 °C)  96.3 °F (35.7 °C)   TempSrc:  Temporal  Axillary   SpO2: 99% 99% 99% 98%   Weight:       Height:           Intake/Output:   Last Shift: I/O last 3 completed shifts: In: 2900 [I.V.:2900]  Out: 100 [Drains:100]  Current Shift: I/O this shift:   In: 502 [I.V.:502]  Out: 54 [Urine:30; Drains:25]      Date 02/07/18 0000 - 02/07/18 2359   Shift 0250-8153 8152-3342 6003-0803 24 Hour Total   I  N  T  A  K  E   I.V.  2900  (46.3) 502  (8) 3402  (54.3)    Shift Total  (mL/kg)  2900  (46.3) 502  (8) 3402  (54.3)   O  U  T  P  U  T   Urine   30 30    Drains  100  (1.6) 25  (0.4) 125  (2)    Shift Total  (mL/kg)  100  (1.6) 55  (0.9) 155  (2.5)   Weight (kg)  62.6 62.6 62.6       Physical Exam:  General:  no apparent distress, alert and cooperative  Neurologic:  alert, oriented, normal speech, no focal findings or movement disorder noted  Lungs:  No increased work of breathing, good air exchange, clear to auscultation bilaterally, no crackles or wheezing, incentive spirometer at bedside  Heart:  regular rate and rhythm and no murmur    Abdomen: soft, non-distended, appropriate tenderness, no CVA tenderness, decreased bowel sounds   Incision: bandage in place without drainage, Tyrone drain in place with 100 cc serosanguinous fluid   Extremities:  no calf tenderness, non edematous, SCDs on and functioning      Assessment/Plan:  Celena Anderson 80 y.o. 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 Will resume in AM pending BP.        Christiano Vanegas DO  Ob/Gyn Resident   2/7/2018, 5:55 PM

## 2018-02-07 NOTE — PROGRESS NOTES
Dr Jeanine Ryan in to speak with pt/notified of vs/urine output/zahra drainage-no orders at present [id number] [facility] 2/7/2018

## 2018-02-07 NOTE — ANESTHESIA PRE PROCEDURE
1800                        Date of last liquid consumption: 02/06/18                        Date of last solid food consumption: 02/05/18    BMI:   Wt Readings from Last 3 Encounters:   02/07/18 138 lb 0.1 oz (62.6 kg)   01/31/18 140 lb (63.5 kg)   02/02/18 142 lb 8 oz (64.6 kg)     Body mass index is 24.45 kg/m². CBC:   Lab Results   Component Value Date    WBC 11.0 01/31/2018    RBC 4.61 01/31/2018    HGB 12.8 01/31/2018    HCT 41.1 01/31/2018    MCV 89.2 01/31/2018    RDW 13.4 01/31/2018     01/31/2018       CMP:   Lab Results   Component Value Date     01/31/2018    K 4.2 01/31/2018    CL 95 01/31/2018    CO2 22 01/31/2018    BUN 18 01/31/2018    CREATININE 0.84 01/31/2018    GFRAA >60 01/31/2018    AGRATIO 0.8 01/22/2018    LABGLOM >60 01/31/2018    GLUCOSE 96 01/31/2018    GLUCOSE 111 01/22/2018    PROT 7.8 01/22/2018    CALCIUM 8.7 01/22/2018    BILITOT 0.3 01/22/2018    ALKPHOS 83 01/22/2018    AST 35 01/22/2018    ALT 34 01/22/2018       POC Tests: No results for input(s): POCGLU, POCNA, POCK, POCCL, POCBUN, POCHEMO, POCHCT in the last 72 hours.     Coags:   Lab Results   Component Value Date    PROTIME 10.0 01/31/2018    PROTIME 12.5 01/20/2018    INR 0.9 01/31/2018    APTT 24.7 01/31/2018       HCG (If Applicable): No results found for: PREGTESTUR, PREGSERUM, HCG, HCGQUANT     ABGs: No results found for: PHART, PO2ART, XZU7LGI, MRT9BGH, BEART, Z6WGAJRP     Type & Screen (If Applicable):  No results found for: LABABO, [address]    Anesthesia Evaluation  Patient summary reviewed and Nursing notes reviewed  Airway: Mallampati: III  TM distance: >3 FB   Neck ROM: limited  Mouth opening: > = 3 FB Dental: normal exam   (+) caps      Pulmonary:Negative Pulmonary ROS and normal exam                               Cardiovascular:    (+) hypertension:,                   Neuro/Psych:   Negative Neuro/Psych ROS              GI/Hepatic/Renal:   (+) GERD:,           Endo/Other:    (+) : arthritis:.,

## 2018-02-08 ENCOUNTER — APPOINTMENT (OUTPATIENT)
Dept: GENERAL RADIOLOGY | Age: 83
DRG: 737 | End: 2018-02-08
Attending: OBSTETRICS & GYNECOLOGY
Payer: MEDICARE

## 2018-02-08 LAB
ABSOLUTE EOS #: <0.03 K/UL (ref 0–0.44)
ABSOLUTE IMMATURE GRANULOCYTE: 0.04 K/UL (ref 0–0.3)
ABSOLUTE LYMPH #: 1.12 K/UL (ref 1.1–3.7)
ABSOLUTE MONO #: 1.29 K/UL (ref 0.1–1.2)
ANION GAP SERPL CALCULATED.3IONS-SCNC: 9 MMOL/L (ref 9–17)
BASOPHILS # BLD: 0 % (ref 0–2)
BASOPHILS ABSOLUTE: <0.03 K/UL (ref 0–0.2)
BUN BLDV-MCNC: 12 MG/DL (ref 8–23)
BUN/CREAT BLD: ABNORMAL (ref 9–20)
CALCIUM SERPL-MCNC: 8.1 MG/DL (ref 8.6–10.4)
CHLORIDE BLD-SCNC: 97 MMOL/L (ref 98–107)
CO2: 22 MMOL/L (ref 20–31)
CREAT SERPL-MCNC: 0.72 MG/DL (ref 0.5–0.9)
CULTURE: NO GROWTH
CULTURE: NORMAL
DIFFERENTIAL TYPE: ABNORMAL
EOSINOPHILS RELATIVE PERCENT: 0 % (ref 1–4)
GFR AFRICAN AMERICAN: >60 ML/MIN
GFR NON-AFRICAN AMERICAN: >60 ML/MIN
GFR SERPL CREATININE-BSD FRML MDRD: ABNORMAL ML/MIN/{1.73_M2}
GFR SERPL CREATININE-BSD FRML MDRD: ABNORMAL ML/MIN/{1.73_M2}
GLUCOSE BLD-MCNC: 142 MG/DL (ref 70–99)
HCT VFR BLD CALC: 33.2 % (ref 36.3–47.1)
HEMOGLOBIN: 10.2 G/DL (ref 11.9–15.1)
IMMATURE GRANULOCYTES: 0 %
LYMPHOCYTES # BLD: 10 % (ref 24–43)
Lab: NORMAL
MAGNESIUM: 1.8 MG/DL (ref 1.6–2.6)
MCH RBC QN AUTO: 28.5 PG (ref 25.2–33.5)
MCHC RBC AUTO-ENTMCNC: 30.7 G/DL (ref 28.4–34.8)
MCV RBC AUTO: 92.7 FL (ref 82.6–102.9)
MONOCYTES # BLD: 12 % (ref 3–12)
NRBC AUTOMATED: 0 PER 100 WBC
PDW BLD-RTO: 13.6 % (ref 11.8–14.4)
PHOSPHORUS: 3.5 MG/DL (ref 2.6–4.5)
PLATELET # BLD: 213 K/UL (ref 138–453)
PLATELET ESTIMATE: ABNORMAL
PMV BLD AUTO: 11.4 FL (ref 8.1–13.5)
POTASSIUM SERPL-SCNC: 4.7 MMOL/L (ref 3.7–5.3)
RBC # BLD: 3.58 M/UL (ref 3.95–5.11)
RBC # BLD: ABNORMAL 10*6/UL
SEG NEUTROPHILS: 78 % (ref 36–65)
SEGMENTED NEUTROPHILS ABSOLUTE COUNT: 8.74 K/UL (ref 1.5–8.1)
SODIUM BLD-SCNC: 128 MMOL/L (ref 135–144)
SPECIMEN DESCRIPTION: NORMAL
STATUS: NORMAL
SURGICAL PATHOLOGY REPORT: NORMAL
WBC # BLD: 11.2 K/UL (ref 3.5–11.3)
WBC # BLD: ABNORMAL 10*3/UL

## 2018-02-08 PROCEDURE — 6360000002 HC RX W HCPCS: Performed by: OBSTETRICS & GYNECOLOGY

## 2018-02-08 PROCEDURE — 2580000003 HC RX 258: Performed by: OBSTETRICS & GYNECOLOGY

## 2018-02-08 PROCEDURE — 94770 HC ETCO2 MONITOR DAILY: CPT

## 2018-02-08 PROCEDURE — G8987 SELF CARE CURRENT STATUS: HCPCS

## 2018-02-08 PROCEDURE — 36415 COLL VENOUS BLD VENIPUNCTURE: CPT

## 2018-02-08 PROCEDURE — 97162 PT EVAL MOD COMPLEX 30 MIN: CPT

## 2018-02-08 PROCEDURE — 80048 BASIC METABOLIC PNL TOTAL CA: CPT

## 2018-02-08 PROCEDURE — S0028 INJECTION, FAMOTIDINE, 20 MG: HCPCS | Performed by: OBSTETRICS & GYNECOLOGY

## 2018-02-08 PROCEDURE — G8978 MOBILITY CURRENT STATUS: HCPCS

## 2018-02-08 PROCEDURE — 6370000000 HC RX 637 (ALT 250 FOR IP): Performed by: OBSTETRICS & GYNECOLOGY

## 2018-02-08 PROCEDURE — 97530 THERAPEUTIC ACTIVITIES: CPT

## 2018-02-08 PROCEDURE — 94762 N-INVAS EAR/PLS OXIMTRY CONT: CPT

## 2018-02-08 PROCEDURE — 83735 ASSAY OF MAGNESIUM: CPT

## 2018-02-08 PROCEDURE — 85025 COMPLETE CBC W/AUTO DIFF WBC: CPT

## 2018-02-08 PROCEDURE — 97166 OT EVAL MOD COMPLEX 45 MIN: CPT

## 2018-02-08 PROCEDURE — 71045 X-RAY EXAM CHEST 1 VIEW: CPT

## 2018-02-08 PROCEDURE — 2500000003 HC RX 250 WO HCPCS: Performed by: OBSTETRICS & GYNECOLOGY

## 2018-02-08 PROCEDURE — 1200000000 HC SEMI PRIVATE

## 2018-02-08 PROCEDURE — G8979 MOBILITY GOAL STATUS: HCPCS

## 2018-02-08 PROCEDURE — 84100 ASSAY OF PHOSPHORUS: CPT

## 2018-02-08 PROCEDURE — 97535 SELF CARE MNGMENT TRAINING: CPT

## 2018-02-08 PROCEDURE — G8988 SELF CARE GOAL STATUS: HCPCS

## 2018-02-08 RX ORDER — ONDANSETRON 4 MG/1
4 TABLET, FILM COATED ORAL EVERY 8 HOURS PRN
Qty: 30 TABLET | Refills: 1 | Status: SHIPPED | OUTPATIENT
Start: 2018-02-08 | End: 2018-02-11

## 2018-02-08 RX ORDER — POLYETHYLENE GLYCOL 3350 17 G/17G
17 POWDER, FOR SOLUTION ORAL DAILY
Status: DISCONTINUED | OUTPATIENT
Start: 2018-02-09 | End: 2018-02-12 | Stop reason: HOSPADM

## 2018-02-08 RX ORDER — METOCLOPRAMIDE 5 MG/1
5 TABLET ORAL EVERY 6 HOURS
Status: DISCONTINUED | OUTPATIENT
Start: 2018-02-08 | End: 2018-02-12 | Stop reason: HOSPADM

## 2018-02-08 RX ORDER — PSEUDOEPHEDRINE HCL 30 MG
100 TABLET ORAL 2 TIMES DAILY
Qty: 60 CAPSULE | Refills: 0 | Status: SHIPPED | OUTPATIENT
Start: 2018-02-08 | End: 2018-02-11

## 2018-02-08 RX ORDER — OXYCODONE HYDROCHLORIDE AND ACETAMINOPHEN 5; 325 MG/1; MG/1
1 TABLET ORAL EVERY 6 HOURS PRN
Qty: 40 TABLET | Refills: 0 | Status: SHIPPED | OUTPATIENT
Start: 2018-02-08 | End: 2018-02-11 | Stop reason: HOSPADM

## 2018-02-08 RX ORDER — UREA 10 %
3 LOTION (ML) TOPICAL NIGHTLY PRN
Status: DISCONTINUED | OUTPATIENT
Start: 2018-02-08 | End: 2018-02-12 | Stop reason: HOSPADM

## 2018-02-08 RX ORDER — METOCLOPRAMIDE HYDROCHLORIDE 5 MG/ML
5 INJECTION INTRAMUSCULAR; INTRAVENOUS EVERY 6 HOURS
Status: DISCONTINUED | OUTPATIENT
Start: 2018-02-08 | End: 2018-02-08

## 2018-02-08 RX ORDER — SODIUM CHLORIDE 9 MG/ML
INJECTION, SOLUTION INTRAVENOUS CONTINUOUS
Status: DISCONTINUED | OUTPATIENT
Start: 2018-02-08 | End: 2018-02-10

## 2018-02-08 RX ADMIN — DIPHENHYDRAMINE HYDROCHLORIDE 25 MG: 50 INJECTION, SOLUTION INTRAMUSCULAR; INTRAVENOUS at 21:04

## 2018-02-08 RX ADMIN — Medication 3 MG: at 21:03

## 2018-02-08 RX ADMIN — METOCLOPRAMIDE 5 MG: 5 TABLET ORAL at 15:23

## 2018-02-08 RX ADMIN — SODIUM CHLORIDE: 9 INJECTION, SOLUTION INTRAVENOUS at 08:03

## 2018-02-08 RX ADMIN — DOCUSATE SODIUM 100 MG: 100 CAPSULE ORAL at 21:04

## 2018-02-08 RX ADMIN — HYDROCORTISONE SODIUM SUCCINATE 50 MG: 100 INJECTION, POWDER, FOR SOLUTION INTRAMUSCULAR; INTRAVENOUS at 09:16

## 2018-02-08 RX ADMIN — ONDANSETRON 4 MG: 2 INJECTION INTRAMUSCULAR; INTRAVENOUS at 03:52

## 2018-02-08 RX ADMIN — DOCUSATE SODIUM 100 MG: 100 CAPSULE ORAL at 09:14

## 2018-02-08 RX ADMIN — SODIUM CHLORIDE, POTASSIUM CHLORIDE, SODIUM LACTATE AND CALCIUM CHLORIDE: 600; 310; 30; 20 INJECTION, SOLUTION INTRAVENOUS at 00:25

## 2018-02-08 RX ADMIN — METOCLOPRAMIDE 5 MG: 5 TABLET ORAL at 09:15

## 2018-02-08 RX ADMIN — FAMOTIDINE 20 MG: 10 INJECTION, SOLUTION INTRAVENOUS at 21:07

## 2018-02-08 RX ADMIN — METOCLOPRAMIDE 5 MG: 5 TABLET ORAL at 21:04

## 2018-02-08 RX ADMIN — FAMOTIDINE 20 MG: 10 INJECTION, SOLUTION INTRAVENOUS at 09:16

## 2018-02-08 RX ADMIN — ENOXAPARIN SODIUM 40 MG: 40 INJECTION SUBCUTANEOUS at 09:15

## 2018-02-08 RX ADMIN — METOPROLOL TARTRATE 25 MG: 25 TABLET ORAL at 09:14

## 2018-02-08 ASSESSMENT — PAIN DESCRIPTION - PAIN TYPE
TYPE: SURGICAL PAIN

## 2018-02-08 ASSESSMENT — PAIN DESCRIPTION - ORIENTATION
ORIENTATION: MID

## 2018-02-08 ASSESSMENT — PAIN DESCRIPTION - PROGRESSION
CLINICAL_PROGRESSION: GRADUALLY IMPROVING
CLINICAL_PROGRESSION: GRADUALLY IMPROVING
CLINICAL_PROGRESSION: NOT CHANGED
CLINICAL_PROGRESSION: GRADUALLY IMPROVING

## 2018-02-08 ASSESSMENT — PAIN DESCRIPTION - DESCRIPTORS
DESCRIPTORS: ACHING

## 2018-02-08 ASSESSMENT — PAIN DESCRIPTION - LOCATION
LOCATION: ABDOMEN

## 2018-02-08 ASSESSMENT — PAIN DESCRIPTION - ONSET
ONSET: ON-GOING
ONSET: ON-GOING

## 2018-02-08 ASSESSMENT — PAIN SCALES - GENERAL
PAINLEVEL_OUTOF10: 4
PAINLEVEL_OUTOF10: 0
PAINLEVEL_OUTOF10: 4
PAINLEVEL_OUTOF10: 6
PAINLEVEL_OUTOF10: 3
PAINLEVEL_OUTOF10: 4
PAINLEVEL_OUTOF10: 4

## 2018-02-08 ASSESSMENT — PAIN DESCRIPTION - FREQUENCY
FREQUENCY: INTERMITTENT
FREQUENCY: CONTINUOUS
FREQUENCY: INTERMITTENT

## 2018-02-08 NOTE — PROGRESS NOTES
Progress Note    Date: 2/8/2018  Time: 7:58 AM    Celena Anderson 80 y.o. female POD # 1    Patient seen and examined. She states pain is well controlled. States she did not sleep well overnight secondary to beeping on various machines. Patient is tolerating oral intake of clear fluids, one episode of nausea overnight which has since resolved. States she does not have much of an appetite. She has melchor catheter in place with approximately 250 ml of clear urine since 0145. She denies any vaginal bleeding. She has not yet ambulated. She is not passing flatus. She denies Fever/Chills, Chest Pain, SOB, N/V, Calf Pain. Vitals:  Vitals:    02/07/18 2008 02/07/18 2030 02/08/18 0007 02/08/18 0358   BP:  (!) 105/52 (!) 107/43 (!) 117/42   Pulse:  72 78 86   Resp: 20 16 16 16   Temp:  97.6 °F (36.4 °C) 97.8 °F (36.6 °C) 97.4 °F (36.3 °C)   TempSrc:  Oral Oral Oral   SpO2: 99% 99% 98% 97%   Weight:       Height:           Intake/Output:   Last Shift: I/O last 3 completed shifts: In: 6845.8 [P.O.:840; I.V.:6005.8]  Out: 1295 [Urine:490; Drains:705]  Current Shift: No intake/output data recorded.       Date 02/08/18 0000 - 02/08/18 2359   Shift 8108-7065 3380-1679 1600-2359 24 Hour Total   I  N  T  A  K  E   P.O.  (mL/kg/hr) 720   720    I.V.  (mL/kg) 2153.8  (34.4)   2153.8  (34.4)    Shift Total  (mL/kg) 2873.8  (45.9)   2873.8  (45.9)   O  U  T  P  U  T   Urine  (mL/kg/hr) 410   410    Drains  (mL/kg) 310  (5)   310  (5)    Shift Total  (mL/kg) 720  (11.5)   720  (11.5)   Weight (kg) 62.6 62.6 62.6 62.6       Physical Exam:  General:  no apparent distress, alert and cooperative  Neurologic:  alert, oriented, normal speech, no focal findings or movement disorder noted  Lungs:  No increased work of breathing, good air exchange, clear to auscultation bilaterally, no crackles or wheezing, incentive spirometer at bedside  Heart:  regular rate and rhythm and no murmur    Abdomen: soft, distended, appropriate tenderness,

## 2018-02-08 NOTE — OP NOTE
the patient does have a  history of breast cancer and is BRCA2 positive. She has had mastectomies  in 1982 and 2006. The patient was counseled to undergo surgical  exploration. FINDINGS:  During the laparotomy, the left ovary was approximately 15 cm. The right ovary was 10 cm. 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 Uterine serosa appeared normal.  The anterior  cul-de-sac was coated with tumor plaque, as was the posterior cul-de-sac. The mesentery of the appendix and the appendix itself had tumor studding. There was an approximately 4 x 6 cm omental cake and innumerable implants  along the small-bowel mesentery, small-bowel, colon, beneath the liver, over  the kidney. The diaphragms were smooth to palpation. There was minimal  ascites noted. At the completion of the case, the patient was felt to be  debulked to miliary residual with only the microscopic residual left behind  by the [place]. There were approximately 1.5 to 2 cm lymph nodes in the  right and left pelvic beds that were excised. There were no palpable  enlarged or suspicious periaortic or aortocaval lymph nodes. DESCRIPTION OF PROCEDURE IN DETAIL:  After informed consent was obtained,  the patient was brought to the operating room with an IV in place. General  endotracheal anesthesia was instilled. She was placed in the low anterior  lithotomy position using Yellofins stirrups. She was examined, prepped and  draped in the usual sterile fashion. A Ocx catheter was placed to down  drain. After changing gloves, the abdomen was entered in a midline  vertical fashion, extending from the symphysis to approximately 3 cm above  the umbilicus. The incision was carried down to and through the fascia. The peritoneum was opened without difficulty and extended throughout the  length of the incision. A portion of the ascites was collected and  submitted for cytology.   A large Wilfrido wound retractor was placed, and the  bowel was packed cephalad. Attention was turned to the pelvis. The round ligaments were cauterized  and transected using LigaSure. The peritoneum lateral and parallel to the  infundibulopelvic ligaments was incised. On the left-hand side, the  utero-ovarian ligament and proximal fallopian tube were cauterized and  transected with the LigaSure. Once ensuring that the ureter was distal to  the vascular pedicles, an infundibulopelvic ligament was serially  cauterized and transected with the LigaSure, and the left tube and ovary  were submitted to Pathology for frozen section. Adhesions of the ovary to  the anterior surface of the rectosigmoid were bluntly lysed away. The  right tube and ovary were also resected. Again, the peritoneum lateral and  parallel to the infundibulopelvic ligament was incised. After ensuring  that the ureter was distal to the vascular pedicle, the infundibulopelvic  ligament was serially cauterized and transected, and the mesovarium was  mobilized. The proximal fallopian tube and utero-ovarian ligaments were  cauterized and transected with the LigaSure, and the right tube and ovary  were submitted to Pathology. The peritoneum overlying the vesicouterine  fold was incised. Care was taken to include tumor plaque along the  anterior cul-de-sac with the uterine specimen. The bladder was mobilized away from the lower uterine segment, cervix, and upper vagina. Uterine arteries were skeletonized. They were cauterized and transected with the LigaSure, and additional  pedicles were created on either side of the cervix until the cervicovaginal  junction was reached. The upper vagina was cross-clamped and transected,  and the uterus and cervix were removed and submitted to Pathology. The  vaginal cuff was closed with interrupted figure-of-eight stitches of 0  Vicryl with good approximation and good hemostasis noted.   The bladder was  back-filled, found to be intact and

## 2018-02-08 NOTE — PROGRESS NOTES
L/min     Social/Functional History  Social/Functional History  Lives With: Spouse  Type of Home:  (Condo)  Home Layout: One level  Home Access: Level entry  Bathroom Shower/Tub: Walk-in shower  Bathroom Toilet: Standard  Bathroom Equipment: Built-in shower seat, Grab bars around toilet, Grab bars in shower  Bathroom Accessibility: Accessible  Home Equipment: Standard walker, [address] Help From: Family  ADL Assistance: Independent  Homemaking Assistance: Independent  Homemaking Responsibilities: Yes  Meal Prep Responsibility: Primary (Shares responsibility with , have used meals on wheels in past and looking into starting to use this service again)  Laundry Responsibility: Primary ([facility] responsibility with )  Cleaning Responsibility: Primary (Hired a cleaning lady recently. Pt will still \"straighten\" house)  Shopping Responsibility: Primary  Ambulation Assistance: Independent  Transfer Assistance: Independent  Active : Yes  Mode of Transportation: [facility]  Occupation: Retired  Leisure & Hobbies: watching TV, volunteering at [employer], sewing, errands  Additional Comments:  is also retired       Objective   Vision: Within Functional Limits  Hearing: Within functional limits    Orientation  Overall Orientation Status: Within Functional Limits    Balance  Sitting Balance: Stand by assistance  Standing Balance: Contact guard assistance    Standing Balance  Time: ~1 minute  Activity: functional transfers/mobility     Sit to stand: Contact guard assistance  Stand to sit: Contact guard assistance    ADL  Feeding: Setup  Grooming: Setup  UE Bathing: Contact guard assistance; Increased time to complete  LE Bathing: Minimal assistance; Increased time to complete  UE Dressing: Contact guard assistance; Increased time to complete  LE Dressing: Minimal assistance;Contact guard assistance  Toileting: Contact guard assistance     Tone RUE  RUE Tone: Normotonic  Tone LUE  LUE Tone: Assessment Tool Used: [facility]  Score: 20/24  Functional Limitation: Self care  Self Care Current Status (): At least 20 percent but less than 40 percent impaired, limited or restricted  Self Care Goal Status (): At least 1 percent but less than 20 percent impaired, limited or restricted    AM-PAC Score  How much help from another person does the pt currently need? Unable A Lot A Little None   1. Putting on and taking off regular lower body clothing? 1      2      3       4   2. Bathing (including washing, rinsing, drying)? 1      2      3      4   3. Toileting, which includes using toilet, bedpan, or urinal?      1      2        3      4   4. Putting on and taking off regular upper body clothing? 1      2      3       4   5. Taking care of personal grooming such as brushing teeth? 1      2      3      4   6. Eating meals? 1      2       3       4     1. Unable = Total/Dependent Assist  2. A Lot = Maximum/Moderate Assist  3. A Little = Minimum/Contact Guard Assist/Supervision  4. None= Modified [place]/Independent    Raw Score Scale Score Scale Score Standard Error Approximate Degree of Functional Impairment     6 17.07 3.74 100%   7 20.13 3.68 92%   8 22.86 3.43 86%   9 25.33 3.17 80%   10 27.31 2.96 75%   11 29.04 2.79 70%   12 30.60 2.68 67%   13 32.03 2.62 63%   14 33.39 2.61 60%   15 34.69 2.65 56%   16 35.96 2.71 53%   17 37.26 2.82 50%   18 38.66 2.97 47%   19 40.22 3.20 43%   20 42.03 3.55 38%   21 44.27 4.08 33%   22 47.10 4.81 26%   23 51.12 5.88 16%   24 57.54 7.36 0%     Goals  Short term goals  Time Frame for Short term goals: Pt will, by discharge.   Short term goal 1: complete ADL routines with use of compensatory strategies and AE PRN with S  Short term goal 2: perform bed mobility with mod I to improve independence with functional transfers  Short term goal 3: perform functional transfers/mobility with least resistive device with S to improve independence with

## 2018-02-08 NOTE — CARE COORDINATION
Case Management Initial Discharge Plan  Celena Anderson,         Readmission Risk              Readmission Risk:        1       Age 72 or Greater:  1    Admitted from SNF or Requires Paid or Family Care:      Currently has CHF,COPD,ARF,CRI,or is on dialysis:      Takes more than 5 Prescription Medications:      Takes Digoxin,Insulin,Anticoagulants,Narcotics or ASA/Plavix:      [address] in Past 12 Months:      On Disability:      Patient Considers own Health:              Met with:patient to discuss discharge plans.    Information verified: address, contacts, phone number, , insurance Yes  PCP: Lavinia Diaz MD  Date of last visit: 18 per [address] Provider: Malissa  Sekou Taylor    Discharge Planning  Current Residence:  Private Residence  Living Arrangements:  Spouse/Significant Other   Home has 1 stories/1 stairs to climb  Support Systems:  Spouse/Significant Other, Children  Current Services PTA:  None Supplier: n/a  Patient able to perform ADL's:Independent  DME used to aid ambulation prior to admission: n/a/during admission: n/a    Potential Assistance Needed:  N/A    Pharmacy: [facility] in [place]   Potential Assistance Purchasing Medications:  No  Does patient want to participate in local refill/ meds to beds program?  No    Patient agreeable to home care: No  [place] of choice provided:  n/a      Type of Home Care Services:  None  Patient expects to be discharged to:  home    Prior SNF/Rehab Placement and Facility: No  Agreeable to SNF/Rehab: No  [place] of choice provided: n/a   Evaluation: n/a    Expected Discharge date:  18  Follow Up Appointment: Best Day/ Time:      Transportation provider: self  Transportation arrangements needed for discharge: No - daughter    Discharge Plan: Home with spouse        Electronically signed by Kevin Pederson RN on 18 at 11:51 AM

## 2018-02-08 NOTE — PROGRESS NOTES
Patient seen and examined. She is resting comfortably in bed. States pain is well controlled She ambulated in room with PT earlier and states she did well and did not have any pain with ambulation. She denies any nausea or vomiting. Reports eating minimal amount of breakfast secondary to lack of appetite. Denies SOB or cough. She denies any other complaints. Vitals:    02/08/18 1213   BP: (!) 123/52   Pulse: 63   Resp: 16   Temp: 98 °F (36.7 °C)   SpO2:         300 ml clear urine in melchor bag- UOP 60 ml/hr. Gen: NAD, AOx4  CV: RRR  Lungs: CTAB  Abd: Soft, distended, bowel sounds hyperactive   Incision: bandage in place with minimal drainage, Tyrone drain in place with approximately 50 ml serosanguinous fluid     Continue current care. Please perfect serve with questions or concerns. Renetta Luna,   1/4/4245, 12:35 PM  OBGYN, PGY-4    Pt seen and examined, sitting up in chair, denies nausea. Has been \"belching\"  No flatus, pain adequately controlled. Ambulated with PT- no orthostatic complaints. Denies increasing cough/shortness of breath  Reviewed chart, vitals and labs- stable/appropriate. CXR results reviewed with pt. Will recheck tomorrow to ensure no further accumulation of pleural effusion. Informed pt of positive cytology on ascites consistent with ovarian cancer. Agree with above exam, assessment and treatment plan. Advance diet as tolerated. Pt reports she takes daily Miralax at home, will resume. Also usually takes melatonin and benadryl at night prn sleep. Will plan on removing melchor catheter on Saturday. Advance diet as tolerated. D/c intraperitoneal drain at d/c home. Will plan on d/c with one month of Xarelto or Eliquis for DVT prophylaxis. Pt voices understanding and agrees. I am heading out of town tonight. Dr. Robinson Godoy will be covering as attending.

## 2018-02-08 NOTE — PROGRESS NOTES
Abdomen  Pain Orientation: Mid  Pain Descriptors: Aching  Pain Frequency: Continuous  Pain Onset: On-going  Clinical Progression: Not changed  Vital Signs  Patient Currently in Pain: Yes       Orientation  Orientation  Overall Orientation Status: Within Normal Limits  Objective   Bed mobility  Rolling to Left: Supervision  Sit to Supine: Minimal assistance  Scooting: Supervision  Transfers  Sit to Stand: Contact guard assistance  Stand to sit: Contact guard assistance  Stand Pivot Transfers: Minimal Assistance  Ambulation  Ambulation?: Yes  Ambulation 1  Surface: level tile  Device: No Device  Assistance: Minimal assistance  Quality of Gait: pt moves slowly, cautiously; reaching for walls, handrails, takes small steps  Distance: 120'x1  Stairs/Curb  Stairs?: No     Balance  Posture: Good  Sitting - Static: Good  Sitting - Dynamic: Good  Standing - Static: Fair  Standing - Dynamic: Fair  Other exercises  Other exercises 1: Use of IS--pt able to achieve 1250 cc's, tends to use ballistic large breath--re-educated for proper use     Assessment    Pt cooperative, tired (didn't sleep well last night). Agreeable to ambulate, was up in chair for lunch upon PT arrival.  Requested to return to bed after done ambulating. Educated re: use of IS, importance of mobility/up in chair. Will try rw next session as pt continues to move very cautiously and is fearful of falling. Body structures, Functions, Activity limitations: Decreased functional mobility ; Decreased endurance;Decreased balance  Assessment: pt agreeable to PT. pt has decreased functional mobility deficits that require acute PT to return to prior level of function.  pt safe to return home with support  Prognosis: Good  Decision Making: Medium Complexity  Patient Education: PT POC  REQUIRES PT FOLLOW UP: Yes  Activity Tolerance  Activity Tolerance: Patient limited by fatigue;Patient limited by endurance  PT Equipment Recommendations  Equipment Needed:  (TBD) G-Code  PT G-Codes  Functional Assessment Tool Used: [place]  Score: 18  Functional Limitation: Mobility: Walking and moving around  Mobility: Walking and Moving Around Current Status (): At least 40 percent but less than 60 percent impaired, limited or restricted  Mobility: Walking and Moving Around Goal Status (): 0 percent impaired, limited or restricted    Goals  Short term goals  Time Frame for Short term goals: 12 visits  Short term goal 1: pt ambulate 300 ft on level surfaces independently  Short term goal 2: pt independent in bed mobility  Short term goal 3: pt independent in functional transfers    Plan    Plan  Try rw next PT session. Times per week: 10-12 visits weekly  Times per day:  (1-2 visits daily)  Current Treatment Recommendations: Strengthening, ROM, Balance Training, Functional Mobility Training, Transfer Training, Endurance Training, Gait Training, Stair training, Pain Management, Home Exercise Program, Safety Education & Training, Patient/Caregiver Education & Training, Positioning  Safety Devices  Type of devices:  All fall risk precautions in place, Call light within reach, Gait belt, Patient at risk for falls, Left in bed, Nurse notified     Therapy Time   Individual Concurrent Group Co-treatment   Time In [address]         Time Out 1328         Minutes [address] [place] [address]

## 2018-02-08 NOTE — PROGRESS NOTES
Chest xray results reviewed as below:     EXAMINATION:   SINGLE VIEW OF THE CHEST       2/8/2018 10:03 am       COMPARISON:   Chest x-ray from 01/31/2018       HISTORY:   ORDERING SYSTEM PROVIDED HISTORY: pleural effusion   TECHNOLOGIST PROVIDED HISTORY:   Reason for exam:->pleural effusion       FINDINGS:   There is hazy density in the right mid to lower lung zones with blunting of   the right costophrenic angle.  There is slight blunting of the left   costophrenic angle.  Heart size appears within normal limits.  There is no   overt pulmonary vascular congestion.  No pneumothorax.  There are   calcifications of the thoracic aorta.  Visualized osseous structures appear   moderately demineralized, but grossly intact, given the non dedicated imaging.           Impression   Similar findings suggesting moderate right pleural effusion and likely   associated atelectasis and likely small left pleural effusion.  Underlying   pneumonia is not excluded.  Continued imaging follow-up is recommended. Discussed results with Dr. Yanet Gonzalez. Will continue to monitor at this time and repeat CXR tomorrow morning. If pleural effusion continues to accumulate will consider draining tomorrow.      Siomara Jones DO  8/7/8024, 10:40 AM  OBGYN, PGY-4

## 2018-02-08 NOTE — PROGRESS NOTES
Physical Therapy    Facility/Department: [facility]  Initial Assessment    NAME: Donaldo Knight  : 1932  MRN: [id number]    Date of Service: 2018  Donaldo Knight is a 80 y.o. female admitted for surgical management of pelvic mass and elevated . She underwent Exploratory Laparotomy, SHADI BSO, Appendectomy, Omentectomy, Debulking on 18. Her labs, vitals, and physical exam on date of discharge were within normal limits. Prior to discharge she passed flatus, ambulated, tolerated a general diet and was discharged to home. Patient Diagnosis(es): The encounter diagnosis was Ex Lap, SHADI, BSO, Omntectomy, Appy, Debulking 18. has a past medical history of Arthritis; BRCA2 genetic carrier; Cancer ([facility] [address] GERD (gastroesophageal reflux disease); Hypertension; and Wears glasses. has a past surgical history that includes Colonoscopy; Upper gastrointestinal endoscopy (2016); Mastectomy (Left, ); Mastectomy (Right, ); Cataract removal (Bilateral); shadi and bso (cervix removed) (2018); and Abdomen surgery (2018).     Restrictions  Restrictions/Precautions  Restrictions/Precautions: General Precautions, Fall Risk  Required Braces or Orthoses?: Yes  Required Braces or Orthoses  Spinal Other: Abdominal binder  Vision/Hearing  Vision: Within Functional Limits  Hearing: Within functional limits     Subjective  General  Patient assessed for rehabilitation services?: Yes  Family / Caregiver Present: Yes (daughter)  Follows Commands: Within Functional Limits  Subjective  Subjective: pt in bed on arrival. pt pleasant and cooperative throughout session  Pain Screening  Patient Currently in Pain: Yes  Pain Assessment  Pain Assessment: 0-10  Pain Level: 4  Pain Type: Surgical pain  Pain Location: Abdomen  Vital Signs  Patient Currently in Pain: Yes       Orientation  Orientation  Overall Orientation Status: Within Normal Limits    Social/Functional History  Social/Functional History  Lives With: Spouse  Type of Home:  (Condo)  Home Layout: One level  Home Access: Level entry  Ambulation Assistance: Independent  Transfer Assistance: Independent  Active : Yes  Occupation: Retired  Additional Comments:  is also retired. ot also has 4 daughters to help provide support  Objective     Observation/Palpation  Posture: Fair    AROM RLE (degrees)  RLE AROM: WFL  AROM LLE (degrees)  LLE AROM : WFL  AROM RUE (degrees)  RUE AROM : WFL  AROM LUE (degrees)  LUE AROM : WFL  Strength RLE  Strength RLE: WFL  Comment: grossly 4+/5, hip flexion not tested d/t abdominal surgery  Strength LLE  Strength LLE: WFL  Comment: grossly 4+/5, hip flexion not tested d/t abdominal surgery  Strength RUE  Strength RUE: WFL  Comment: grossly 4/5  Strength LUE  Strength LUE: WFL  Comment: grossly 4/5     Sensation  Overall Sensation Status: WFL  Bed mobility  Rolling to Left: Supervision  Supine to Sit: Contact guard assistance  Sit to Supine: Minimal assistance (for legs)  Scooting: Independent  Comment: pt benefits from cues to log roll  Transfers  Sit to Stand: Contact guard assistance  Stand to sit: Contact guard assistance  Ambulation  Ambulation?: Yes  Ambulation 1  Surface: level tile  Device: No Device  Assistance: Contact guard assistance (x2)  Quality of Gait: pt very cautious, took slow steps at beginning. pt needed cues to take longer steps, increase speed  Distance: 80 ft  Stairs/Curb  Stairs?: No     Balance  Posture: Fair  Sitting - Static: Good  Sitting - Dynamic: Good  Standing - Static: Fair;+  Standing - Dynamic: Fair        Assessment   Assessment: pt agreeable to PT. pt has decreased functional mobility deficits that require acute PT to return to prior level of function. pt safe to return home with support  Body structures, Functions, Activity limitations: Decreased functional mobility ; Decreased endurance;Decreased balance  Prognosis: Good  Decision Making: Medium Complexity  Patient

## 2018-02-09 ENCOUNTER — APPOINTMENT (OUTPATIENT)
Dept: ULTRASOUND IMAGING | Age: 83
DRG: 737 | End: 2018-02-09
Attending: OBSTETRICS & GYNECOLOGY
Payer: MEDICARE

## 2018-02-09 ENCOUNTER — APPOINTMENT (OUTPATIENT)
Dept: GENERAL RADIOLOGY | Age: 83
DRG: 737 | End: 2018-02-09
Attending: OBSTETRICS & GYNECOLOGY
Payer: MEDICARE

## 2018-02-09 LAB
ABSOLUTE EOS #: 0.11 K/UL (ref 0–0.44)
ABSOLUTE IMMATURE GRANULOCYTE: <0.03 K/UL (ref 0–0.3)
ABSOLUTE LYMPH #: 0.93 K/UL (ref 1.1–3.7)
ABSOLUTE MONO #: 1.31 K/UL (ref 0.1–1.2)
ANION GAP SERPL CALCULATED.3IONS-SCNC: 9 MMOL/L (ref 9–17)
BASOPHILS # BLD: 0 % (ref 0–2)
BASOPHILS ABSOLUTE: <0.03 K/UL (ref 0–0.2)
BUN BLDV-MCNC: 8 MG/DL (ref 8–23)
BUN/CREAT BLD: ABNORMAL (ref 9–20)
CALCIUM SERPL-MCNC: 8.1 MG/DL (ref 8.6–10.4)
CHLORIDE BLD-SCNC: 104 MMOL/L (ref 98–107)
CO2: 23 MMOL/L (ref 20–31)
CREAT SERPL-MCNC: 0.62 MG/DL (ref 0.5–0.9)
DIFFERENTIAL TYPE: ABNORMAL
EOSINOPHILS RELATIVE PERCENT: 1 % (ref 1–4)
GFR AFRICAN AMERICAN: >60 ML/MIN
GFR NON-AFRICAN AMERICAN: >60 ML/MIN
GFR SERPL CREATININE-BSD FRML MDRD: ABNORMAL ML/MIN/{1.73_M2}
GFR SERPL CREATININE-BSD FRML MDRD: ABNORMAL ML/MIN/{1.73_M2}
GLUCOSE BLD-MCNC: 105 MG/DL (ref 70–99)
HCT VFR BLD CALC: 32.7 % (ref 36.3–47.1)
HEMOGLOBIN: 10.2 G/DL (ref 11.9–15.1)
IMMATURE GRANULOCYTES: 0 %
INR BLD: 0.9
LYMPHOCYTES # BLD: 11 % (ref 24–43)
MAGNESIUM: 1.9 MG/DL (ref 1.6–2.6)
MCH RBC QN AUTO: 28 PG (ref 25.2–33.5)
MCHC RBC AUTO-ENTMCNC: 31.2 G/DL (ref 28.4–34.8)
MCV RBC AUTO: 89.8 FL (ref 82.6–102.9)
MONOCYTES # BLD: 16 % (ref 3–12)
NRBC AUTOMATED: 0 PER 100 WBC
PARTIAL THROMBOPLASTIN TIME: 25.5 SEC (ref 21.3–31.3)
PDW BLD-RTO: 13.7 % (ref 11.8–14.4)
PHOSPHORUS: 2 MG/DL (ref 2.6–4.5)
PLATELET # BLD: 219 K/UL (ref 138–453)
PLATELET ESTIMATE: ABNORMAL
PMV BLD AUTO: 11.8 FL (ref 8.1–13.5)
POTASSIUM SERPL-SCNC: 4 MMOL/L (ref 3.7–5.3)
PROTHROMBIN TIME: 10 SEC (ref 9.4–12.6)
RBC # BLD: 3.64 M/UL (ref 3.95–5.11)
RBC # BLD: ABNORMAL 10*6/UL
SEG NEUTROPHILS: 72 % (ref 36–65)
SEGMENTED NEUTROPHILS ABSOLUTE COUNT: 6.06 K/UL (ref 1.5–8.1)
SODIUM BLD-SCNC: 136 MMOL/L (ref 135–144)
SURGICAL PATHOLOGY REPORT: NORMAL
WBC # BLD: 8.4 K/UL (ref 3.5–11.3)
WBC # BLD: ABNORMAL 10*3/UL

## 2018-02-09 PROCEDURE — 84100 ASSAY OF PHOSPHORUS: CPT

## 2018-02-09 PROCEDURE — 97530 THERAPEUTIC ACTIVITIES: CPT

## 2018-02-09 PROCEDURE — 94770 HC ETCO2 MONITOR DAILY: CPT

## 2018-02-09 PROCEDURE — 1200000000 HC SEMI PRIVATE

## 2018-02-09 PROCEDURE — 97110 THERAPEUTIC EXERCISES: CPT

## 2018-02-09 PROCEDURE — 2500000003 HC RX 250 WO HCPCS: Performed by: OBSTETRICS & GYNECOLOGY

## 2018-02-09 PROCEDURE — 85730 THROMBOPLASTIN TIME PARTIAL: CPT

## 2018-02-09 PROCEDURE — 94762 N-INVAS EAR/PLS OXIMTRY CONT: CPT

## 2018-02-09 PROCEDURE — S0028 INJECTION, FAMOTIDINE, 20 MG: HCPCS | Performed by: OBSTETRICS & GYNECOLOGY

## 2018-02-09 PROCEDURE — 83735 ASSAY OF MAGNESIUM: CPT

## 2018-02-09 PROCEDURE — 6370000000 HC RX 637 (ALT 250 FOR IP): Performed by: OBSTETRICS & GYNECOLOGY

## 2018-02-09 PROCEDURE — 71045 X-RAY EXAM CHEST 1 VIEW: CPT

## 2018-02-09 PROCEDURE — 85025 COMPLETE CBC W/AUTO DIFF WBC: CPT

## 2018-02-09 PROCEDURE — 0W993ZZ DRAINAGE OF RIGHT PLEURAL CAVITY, PERCUTANEOUS APPROACH: ICD-10-PCS | Performed by: RADIOLOGY

## 2018-02-09 PROCEDURE — 36415 COLL VENOUS BLD VENIPUNCTURE: CPT

## 2018-02-09 PROCEDURE — 32555 ASPIRATE PLEURA W/ IMAGING: CPT

## 2018-02-09 PROCEDURE — 85610 PROTHROMBIN TIME: CPT

## 2018-02-09 PROCEDURE — 2580000003 HC RX 258: Performed by: OBSTETRICS & GYNECOLOGY

## 2018-02-09 PROCEDURE — 80048 BASIC METABOLIC PNL TOTAL CA: CPT

## 2018-02-09 PROCEDURE — 6360000002 HC RX W HCPCS: Performed by: OBSTETRICS & GYNECOLOGY

## 2018-02-09 PROCEDURE — 97116 GAIT TRAINING THERAPY: CPT

## 2018-02-09 RX ORDER — HYDROCODONE BITARTRATE AND ACETAMINOPHEN 5; 325 MG/1; MG/1
1 TABLET ORAL EVERY 4 HOURS PRN
Status: DISCONTINUED | OUTPATIENT
Start: 2018-02-09 | End: 2018-02-12 | Stop reason: HOSPADM

## 2018-02-09 RX ORDER — HYDROCODONE BITARTRATE AND ACETAMINOPHEN 5; 325 MG/1; MG/1
2 TABLET ORAL EVERY 4 HOURS PRN
Status: DISCONTINUED | OUTPATIENT
Start: 2018-02-09 | End: 2018-02-12 | Stop reason: HOSPADM

## 2018-02-09 RX ADMIN — ONDANSETRON 4 MG: 2 INJECTION INTRAMUSCULAR; INTRAVENOUS at 05:49

## 2018-02-09 RX ADMIN — METOCLOPRAMIDE 5 MG: 5 TABLET ORAL at 06:01

## 2018-02-09 RX ADMIN — DIPHENHYDRAMINE HYDROCHLORIDE 25 MG: 50 INJECTION, SOLUTION INTRAMUSCULAR; INTRAVENOUS at 21:43

## 2018-02-09 RX ADMIN — METOCLOPRAMIDE 5 MG: 5 TABLET ORAL at 14:49

## 2018-02-09 RX ADMIN — DOCUSATE SODIUM 100 MG: 100 CAPSULE ORAL at 21:39

## 2018-02-09 RX ADMIN — Medication 3 MG: at 21:39

## 2018-02-09 RX ADMIN — FAMOTIDINE 20 MG: 10 INJECTION, SOLUTION INTRAVENOUS at 09:48

## 2018-02-09 RX ADMIN — POLYETHYLENE GLYCOL 3350 17 G: 17 POWDER, FOR SOLUTION ORAL at 14:48

## 2018-02-09 RX ADMIN — SODIUM PHOSPHATE, MONOBASIC, MONOHYDRATE 10.02 MMOL: 276; 142 INJECTION, SOLUTION INTRAVENOUS at 12:24

## 2018-02-09 RX ADMIN — DOCUSATE SODIUM 100 MG: 100 CAPSULE ORAL at 14:49

## 2018-02-09 RX ADMIN — FAMOTIDINE 20 MG: 10 INJECTION, SOLUTION INTRAVENOUS at 21:39

## 2018-02-09 RX ADMIN — ENOXAPARIN SODIUM 40 MG: 40 INJECTION SUBCUTANEOUS at 14:48

## 2018-02-09 RX ADMIN — METOCLOPRAMIDE 5 MG: 5 TABLET ORAL at 21:39

## 2018-02-09 ASSESSMENT — PAIN DESCRIPTION - PAIN TYPE: TYPE: SURGICAL PAIN

## 2018-02-09 ASSESSMENT — PAIN SCALES - GENERAL: PAINLEVEL_OUTOF10: 5

## 2018-02-09 ASSESSMENT — PAIN DESCRIPTION - FREQUENCY: FREQUENCY: CONTINUOUS

## 2018-02-09 ASSESSMENT — PAIN DESCRIPTION - LOCATION: LOCATION: ABDOMEN

## 2018-02-09 ASSESSMENT — PAIN DESCRIPTION - DESCRIPTORS: DESCRIPTORS: ACHING

## 2018-02-09 NOTE — PROGRESS NOTES
Progress Note    Date: 2/9/2018  Time: 8:13 AM    Celena Anderson 80 y.o. female POD # 2    Patient seen and examined. She states pain is well controlled. She slept well overnight. Patient is tolerating oral intake of clear fluids and did not have any nausea or vomiting. States she continues to not have an appetite. Melchor catheter in place draining clear urine will continue melchor catheter until tomrrow. She denies any vaginal bleeding. She ambulated yesterday without difficulty. She is not passing flatus. She denies Fever/Chills, Chest Pain, SOB, N/V, Calf Pain. Vitals:  Vitals:    02/08/18 2136 02/09/18 0019 02/09/18 0357 02/09/18 0425   BP:  (!) 121/42 (!) 129/56    Pulse:  66 79    Resp: 16 19 14 14   Temp:   97.4 °F (36.3 °C)    TempSrc:   Oral    SpO2: 98% 94% 96% 96%   Weight:       Height:           Intake/Output:   Last Shift: I/O last 3 completed shifts: In: 2867 [P.O.:1050; I.V.:1817]  Out: 1985 [FRRKK:3674; Drains:510]  Current Shift: No intake/output data recorded.       Date 02/09/18 0000 - 02/09/18 2359   Shift 2997-53919 1981-6128 1600-2359 24 Hour Total   I  N  T  A  K  E   I.V.  (mL/kg) 933  (14.9)   933  (14.9)    Shift Total  (mL/kg) 933  (14.9)   933  (14.9)   O  U  T  P  U  T   Urine  (mL/kg/hr) 750  (1.5)   750    Drains  (mL/kg) 100  (1.6)   100  (1.6)    Shift Total  (mL/kg) 850  (13.6)   850  (13.6)   Weight (kg) 62.6 62.6 62.6 62.6       Physical Exam:  General:  no apparent distress, alert and cooperative  Neurologic:  alert, oriented, normal speech, no focal findings or movement disorder noted  Lungs:  No increased work of breathing, good air exchange, clear to auscultation bilaterally, no crackles or wheezing, incentive spirometer at bedside  Heart:  regular rate and rhythm and no murmur    Abdomen: soft, distended- but improved from yesterday, appropriate tenderness, normal bowel sounds   Incision: bandage removed, incision clean, dry, intact with steri-strips in place, Tyrone drain Result Value Ref Range    Magnesium 1.9 1.6 - 2.6 mg/dL       Assessment/Plan:  Celena Anderson 80 y.o. female POD #2 s/p Exploratory Laparotomy, SHADI BSO, Omntectomy, Appendectomy, Debulking     - Doing well, vitals stable    - Encourage ambulation and use of incentive spirometry   - Melchor in place draining clear urine,  ml/hr. Continue melchor until POD 3.   - IV fluids: NS at 75 ml/hr, will discontinue once patient is tolerating more PO. Patient to be NPO until IR drainage pleural effusion.    - Pain control: Dilaudid PCA, IV tylenol with good pain control. Will transition to oral meds once tolerating PO   - DVT prophylaxis: Lovenox, SCDs. AM Lovenox held until after IR drainage of pleural effusion.   - Diet: Tolerating clear liquids however patient now to be NPO secondary to IR drainage of pleural effusion. Once she is done with IR will continue to advance diet as tolerated   - Labs: reviewed as above, PT, PTT added on per IR's request and pending    - PT/OT consulted    - Path: Intraoperative frozen section revealed Serous Ovarian cancer      Pleural Effusion   - Increase in pleural effusion on CXR today    - Discussed with IR and they will proceed with US guided drainage    - Patient NPO, Lovenox held, and coags ordered. Hypertension   - Home med: Lopressor 25 mg BID   - Patient normotensive       Please perfect serve Karrie Rae with questions or concerns. Brenda Hood DO  Ob/Gyn Resident   2/9/2018, 8:13 AM      Attending Physician Statement    Patient seen earlier today. I have discussed the care of Dominic Jayden, including pertinent history and exam findings,  with the resident. I have seen and/or examined the patient and the key elements of all parts of the encounter have been performed and/or reviewed by me. I agree with the assessment, plan and orders as documented by the resident. To have another thoracentesis today by IR. Third procedure since admission.   We'll need

## 2018-02-09 NOTE — BRIEF OP NOTE
Brief Postoperative Note    Celena Anderson  YOB: 1932  [id number]    Pre-operative Diagnosis: Right pleural effusion     Post-operative Diagnosis: Same    Procedure: U/S guided right thoracentesis     Anesthesia: Local    Surgeons/Assistants: Estella Soliz MD    Estimated Blood Loss: less than 50     Complications: None    Specimens: Was Not Obtained    Findings: 1 L of olga colored fluid drained     Electronically signed by Estella Soliz MD on 2/9/2018 at 2:12 PM

## 2018-02-09 NOTE — PROGRESS NOTES
Physical Therapy  Facility/Department: [facility] ONC/MED SURG  Daily Treatment Note  NAME: Dominic Carpenter  : 1932  MRN: [id number]    Date of Service: 2018    Patient Diagnosis(es):   Patient Active Problem List    Diagnosis Date Noted    Ex Lap, SHADI, BSO, Omntectomy, Appy, Debulking 2018    Pleural effusion 2018    Pelvic mass 2018    Malignant neoplasm of right ovary ([address] 01/15/2018    BRCA2 genetic carrier     HTN (hypertension) 2017    Breast cancer ([address] 2017    Malignant ne[facility] ([facility] of unspecified site of unspecified female 2017    Malignant neoplasm of other specified sites of female breast 2017       Past Medical History:   Diagnosis Date    Arthritis     neck    BRCA2 genetic carrier     no specifics given     Cancer ([address] ,     bilat breasts    GERD (gastroesophageal reflux disease)     Hypertension     Wears glasses      Past Surgical History:   Procedure Laterality Date    ABDOMEN SURGERY  2018     EXPLORATORY LAPAROTOMY, TOTAL ABDOMINAL HYSTERECTOMY, BSO, APPENDECTOMY, OMENTUMECTOMY, RADICAL TUMOR DEBULKING USING     CATARACT REMOVAL Bilateral     COLONOSCOPY      MASTECTOMY Left 1982    MASTECTOMY Right     [place] OFFICE/OUTPT VISIT,PROCEDURE ONLY N/A 2018    EXPLORATORY LAPAROTOMY, TOTAL ABDOMINAL HYSTERECTOMY, BSO, APPENDECTOMY, OMENTUMECTOMY, RADICAL TUMOR DEBULKING USING SONOPET. performed by Christine Dick MD at [address] [place] [address]  2018    UPPER GASTROINTESTINAL ENDOSCOPY  2016       Restrictions  Restrictions/Precautions  Restrictions/Precautions: General Precautions, Fall Risk, Surgical Protocols  Required Braces or Orthoses?: Yes (abdominal binder)  Required Braces or Orthoses  Spinal Other: Abdominal binder  Subjective   General  Chart Reviewed: Yes  Response To Previous Treatment: Patient with no complaints from previous session. Family / Caregiver Present:  Yes (daughters, )  Subjective  Subjective: Pt in bed on arrival. Agreeable to PT with encouragement. Pt states she has a rough night and wants to stay in bed all day. After ambulation, pt states, \"I am glad I got up\"   General Comment  Comments: Pt left in chair with BLEs elevated and call light in reach. Pain Screening  Patient Currently in Pain: Yes  Pain Assessment  Pain Assessment: 0-10  Pain Level: 5  Pain Type: Surgical pain  Pain Location: Abdomen  Pain Descriptors: Aching  Pain Frequency: Continuous  Pain Intervention(s): Medication (see eMar);Repositioned; Emotional support;Distraction  Vital Signs  Patient Currently in Pain: Yes       Orientation  Orientation  Overall Orientation Status: Within Normal Limits  Objective   Bed mobility  Supine to Sit: Minimal assistance (completed with HOB flat, cues for sequencing, required assist with upper trunk)  Scooting: Supervision  Comment: log roll technique  Transfers  Sit to Stand: Contact guard assistance  Stand to sit: Contact guard assistance  Bed to Chair: Contact guard assistance  Ambulation  Ambulation?: Yes  Ambulation 1  Surface: level tile  Device: Rolling Walker  Assistance: Stand by assistance  Quality of Gait: cues to increase step length with poor follow thru, decreased tariq  Distance: 150ft  Stairs/Curb  Stairs?: No     Balance  Posture: Good  Sitting - Static: Good  Sitting - Dynamic: Good  Standing - Static: Fair;+  Standing - Dynamic: Fair (with RW )       Exercises:  Seated LE exercise program: Long Arc Quads, hip abduction/adduction, heel/toe raises, and marches. Reps: 15x each  Upper extremity exercises: Bicep curl, shoulder flexion/extension, punches, shoulder abduction/adduction. Reps:15x each  IS x 5 reps, pt achieved 1000 cc's     Assessment   Body structures, Functions, Activity limitations: Decreased functional mobility ; Decreased endurance;Decreased balance  Assessment: Pt with decreased endurance and impaired functional mobility. Displays no LOB during ambulation with RW. Pt should be safe to return home with assist from family. Prognosis: Good  Patient Education: IS, HEP, logroll  REQUIRES PT FOLLOW UP: Yes  Activity Tolerance  Activity Tolerance: Patient limited by endurance        Goals  Short term goals  Time Frame for Short term goals: 12 visits  Short term goal 1: pt ambulate 300 ft on level surfaces independently  Short term goal 2: pt independent in bed mobility  Short term goal 3: pt independent in functional transfers    Plan    Plan  Times per week: 10-12 visits weekly  Times per day:  (1-2 visits daily)  Current Treatment Recommendations: Strengthening, ROM, Balance Training, Functional Mobility Training, Transfer Training, Endurance Training, Gait Training, Stair training, Pain Management, Home Exercise Program, Safety Education & Training, Patient/Caregiver Education & Training, Positioning  Safety Devices  Type of devices:  All fall risk precautions in place, Left in chair, Call light within reach, Gait belt, Patient at risk for falls, Nurse notified     Therapy Time   Individual Concurrent Group Co-treatment   Time In [address] [place] [address]         Time Out 1132         Minutes [address] [place], [place]

## 2018-02-09 NOTE — PROGRESS NOTES
Physical Therapy  Facility/Department: [facility] ONC/MED SURG  Daily Treatment Note  NAME: Dominic Carpenter  : 1932  MRN: [id number]    Date of Service: 2018    Patient Diagnosis(es):   Patient Active Problem List    Diagnosis Date Noted    Ex Lap, HSADI, BSO, Omntectomy, Appy, Debulking 2018    Pleural effusion 2018    Pelvic mass 2018    Malignant neoplasm of right ovary ([address] 01/15/2018    BRCA2 genetic carrier     HTN (hypertension) 2017    Breast cancer ([address] 2017    Malignant neoplasm ([facility] [address] [address] of unspecified site of unspecified female 2017    Malignant neoplasm of other specified sites of female breast 2017       Past Medical History:   Diagnosis Date    Arthritis     neck    BRCA2 genetic carrier     no specifics given     Cancer ([address] ,     bilat breasts    GERD (gastroesophageal reflux disease)     Hypertension     Wears glasses      Past Surgical History:   Procedure Laterality Date    ABDOMEN SURGERY  2018     EXPLORATORY LAPAROTOMY, TOTAL ABDOMINAL HYSTERECTOMY, BSO, APPENDECTOMY, OMENTUMECTOMY, RADICAL TUMOR DEBULKING USING     CATARACT REMOVAL Bilateral     COLONOSCOPY      MASTECTOMY Left 1982    MASTECTOMY Right     [place] OFFICE/OUTPT VISIT,PROCEDURE ONLY N/A 2018    EXPLORATORY LAPAROTOMY, TOTAL ABDOMINAL HYSTERECTOMY, BSO, APPENDECTOMY, OMENTUMECTOMY, RADICAL TUMOR DEBULKING USING SONOPET. performed by Christine Dick MD at [address] [place] [address]  2018    UPPER GASTROINTESTINAL ENDOSCOPY  2016       Restrictions  Restrictions/Precautions  Restrictions/Precautions: General Precautions, Fall Risk, Surgical Protocols  Required Braces or Orthoses?: Yes (abdominal binder)  Required Braces or Orthoses  Spinal Other: Abdominal binder  Subjective   General  Chart Reviewed: Yes  Response To Previous Treatment: Patient with no complaints from previous session. Family / Caregiver Present:  Yes

## 2018-02-10 ENCOUNTER — APPOINTMENT (OUTPATIENT)
Dept: GENERAL RADIOLOGY | Age: 83
DRG: 737 | End: 2018-02-10
Attending: OBSTETRICS & GYNECOLOGY
Payer: MEDICARE

## 2018-02-10 LAB
ABSOLUTE EOS #: 0.38 K/UL (ref 0–0.44)
ABSOLUTE IMMATURE GRANULOCYTE: 0.03 K/UL (ref 0–0.3)
ABSOLUTE LYMPH #: 1.17 K/UL (ref 1.1–3.7)
ABSOLUTE MONO #: 0.85 K/UL (ref 0.1–1.2)
ANION GAP SERPL CALCULATED.3IONS-SCNC: 8 MMOL/L (ref 9–17)
BASOPHILS # BLD: 0 % (ref 0–2)
BASOPHILS ABSOLUTE: <0.03 K/UL (ref 0–0.2)
BUN BLDV-MCNC: 7 MG/DL (ref 8–23)
BUN/CREAT BLD: ABNORMAL (ref 9–20)
CALCIUM SERPL-MCNC: 7.7 MG/DL (ref 8.6–10.4)
CHLORIDE BLD-SCNC: 102 MMOL/L (ref 98–107)
CO2: 23 MMOL/L (ref 20–31)
CREAT SERPL-MCNC: 0.55 MG/DL (ref 0.5–0.9)
DIFFERENTIAL TYPE: ABNORMAL
EOSINOPHILS RELATIVE PERCENT: 5 % (ref 1–4)
GFR AFRICAN AMERICAN: >60 ML/MIN
GFR NON-AFRICAN AMERICAN: >60 ML/MIN
GFR SERPL CREATININE-BSD FRML MDRD: ABNORMAL ML/MIN/{1.73_M2}
GFR SERPL CREATININE-BSD FRML MDRD: ABNORMAL ML/MIN/{1.73_M2}
GLUCOSE BLD-MCNC: 106 MG/DL (ref 70–99)
HCT VFR BLD CALC: 29.4 % (ref 36.3–47.1)
HEMOGLOBIN: 9 G/DL (ref 11.9–15.1)
IMMATURE GRANULOCYTES: 0 %
LYMPHOCYTES # BLD: 15 % (ref 24–43)
MAGNESIUM: 1.9 MG/DL (ref 1.6–2.6)
MCH RBC QN AUTO: 28.2 PG (ref 25.2–33.5)
MCHC RBC AUTO-ENTMCNC: 30.6 G/DL (ref 28.4–34.8)
MCV RBC AUTO: 92.2 FL (ref 82.6–102.9)
MONOCYTES # BLD: 11 % (ref 3–12)
NRBC AUTOMATED: 0 PER 100 WBC
PDW BLD-RTO: 13.8 % (ref 11.8–14.4)
PHOSPHORUS: 2.8 MG/DL (ref 2.6–4.5)
PLATELET # BLD: 252 K/UL (ref 138–453)
PLATELET ESTIMATE: ABNORMAL
PMV BLD AUTO: 11.3 FL (ref 8.1–13.5)
POTASSIUM SERPL-SCNC: 4 MMOL/L (ref 3.7–5.3)
RBC # BLD: 3.19 M/UL (ref 3.95–5.11)
RBC # BLD: ABNORMAL 10*6/UL
SEG NEUTROPHILS: 69 % (ref 36–65)
SEGMENTED NEUTROPHILS ABSOLUTE COUNT: 5.42 K/UL (ref 1.5–8.1)
SODIUM BLD-SCNC: 133 MMOL/L (ref 135–144)
WBC # BLD: 7.9 K/UL (ref 3.5–11.3)
WBC # BLD: ABNORMAL 10*3/UL

## 2018-02-10 PROCEDURE — 83735 ASSAY OF MAGNESIUM: CPT

## 2018-02-10 PROCEDURE — 2500000003 HC RX 250 WO HCPCS: Performed by: OBSTETRICS & GYNECOLOGY

## 2018-02-10 PROCEDURE — 6370000000 HC RX 637 (ALT 250 FOR IP): Performed by: OBSTETRICS & GYNECOLOGY

## 2018-02-10 PROCEDURE — 84100 ASSAY OF PHOSPHORUS: CPT

## 2018-02-10 PROCEDURE — 2580000003 HC RX 258: Performed by: OBSTETRICS & GYNECOLOGY

## 2018-02-10 PROCEDURE — 97116 GAIT TRAINING THERAPY: CPT

## 2018-02-10 PROCEDURE — 6360000002 HC RX W HCPCS: Performed by: OBSTETRICS & GYNECOLOGY

## 2018-02-10 PROCEDURE — 71045 X-RAY EXAM CHEST 1 VIEW: CPT

## 2018-02-10 PROCEDURE — 36415 COLL VENOUS BLD VENIPUNCTURE: CPT

## 2018-02-10 PROCEDURE — S0028 INJECTION, FAMOTIDINE, 20 MG: HCPCS | Performed by: OBSTETRICS & GYNECOLOGY

## 2018-02-10 PROCEDURE — 80048 BASIC METABOLIC PNL TOTAL CA: CPT

## 2018-02-10 PROCEDURE — 85025 COMPLETE CBC W/AUTO DIFF WBC: CPT

## 2018-02-10 PROCEDURE — 1200000000 HC SEMI PRIVATE

## 2018-02-10 PROCEDURE — 51798 US URINE CAPACITY MEASURE: CPT

## 2018-02-10 RX ADMIN — METOCLOPRAMIDE 5 MG: 5 TABLET ORAL at 09:00

## 2018-02-10 RX ADMIN — DOCUSATE SODIUM 100 MG: 100 CAPSULE ORAL at 20:56

## 2018-02-10 RX ADMIN — METOCLOPRAMIDE 5 MG: 5 TABLET ORAL at 03:59

## 2018-02-10 RX ADMIN — HYDROCODONE BITARTRATE AND ACETAMINOPHEN 1 TABLET: 5; 325 TABLET ORAL at 02:49

## 2018-02-10 RX ADMIN — FAMOTIDINE 20 MG: 10 INJECTION, SOLUTION INTRAVENOUS at 09:00

## 2018-02-10 RX ADMIN — METOPROLOL TARTRATE 25 MG: 25 TABLET ORAL at 03:59

## 2018-02-10 RX ADMIN — METOPROLOL TARTRATE 25 MG: 25 TABLET ORAL at 20:56

## 2018-02-10 RX ADMIN — FAMOTIDINE 20 MG: 10 INJECTION, SOLUTION INTRAVENOUS at 20:56

## 2018-02-10 RX ADMIN — DOCUSATE SODIUM 100 MG: 100 CAPSULE ORAL at 09:00

## 2018-02-10 RX ADMIN — METOPROLOL TARTRATE 25 MG: 25 TABLET ORAL at 09:00

## 2018-02-10 RX ADMIN — ENOXAPARIN SODIUM 40 MG: 40 INJECTION SUBCUTANEOUS at 09:00

## 2018-02-10 RX ADMIN — METOCLOPRAMIDE 5 MG: 5 TABLET ORAL at 14:30

## 2018-02-10 RX ADMIN — POLYETHYLENE GLYCOL 3350 17 G: 17 POWDER, FOR SOLUTION ORAL at 09:00

## 2018-02-10 RX ADMIN — SODIUM CHLORIDE: 9 INJECTION, SOLUTION INTRAVENOUS at 00:00

## 2018-02-10 RX ADMIN — METOCLOPRAMIDE 5 MG: 5 TABLET ORAL at 20:56

## 2018-02-10 ASSESSMENT — PAIN DESCRIPTION - LOCATION: LOCATION: ABDOMEN

## 2018-02-10 ASSESSMENT — PAIN DESCRIPTION - ONSET: ONSET: ON-GOING

## 2018-02-10 ASSESSMENT — PAIN DESCRIPTION - FREQUENCY: FREQUENCY: CONTINUOUS

## 2018-02-10 ASSESSMENT — PAIN DESCRIPTION - PROGRESSION

## 2018-02-10 ASSESSMENT — PAIN DESCRIPTION - ORIENTATION: ORIENTATION: ANTERIOR;MID

## 2018-02-10 ASSESSMENT — PAIN DESCRIPTION - PAIN TYPE: TYPE: SURGICAL PAIN

## 2018-02-10 ASSESSMENT — PAIN DESCRIPTION - DESCRIPTORS: DESCRIPTORS: ACHING

## 2018-02-10 ASSESSMENT — PAIN SCALES - GENERAL
PAINLEVEL_OUTOF10: 2
PAINLEVEL_OUTOF10: 8

## 2018-02-10 NOTE — PROGRESS NOTES
no calf tenderness, non edematous, SCDs on and functioning    Assessment/Plan:  Celena Anderson 80 y.o. female POD #3 s/p Exploratory Laparotomy, SHADI BSO, Omntectomy, Appendectomy, Debulking     - Doing well, vitals stable    - Encourage ambulation and use of incentive spirometry   - Melchor in place draining clear urine,  ml/hr. 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 Repeat CXR 2/10   - IR drainage on 2/9, noted to have 1L removed   - Patient NPO, Lovenox held, and coags ordered.      Hypertension   - Home med: Lopressor 25 mg BID   - Patient normotensive       Rob Angeles DO  Ob/Gyn Resident   2/10/2018, 6:08 AM

## 2018-02-10 NOTE — PROGRESS NOTES
Physical Therapy  Facility/Department: [facility] ONC/MED SURG  Daily Treatment Note  NAME: Deisi Estimnguyen  : 1932  MRN: [id number]    Date of Service: 2/10/2018    Patient Diagnosis(es):   Patient Active Problem List    Diagnosis Date Noted    Ex Lap, SHADI, BSO, Omntectomy, Appy, Debulking 2018    Pleural effusion 2018    Pelvic mass 2018    Malignant neoplasm of right ovary ([address] 01/15/2018    BRCA2 genetic carrier     HTN (hypertension) 2017    Breast [facility] 2017    Malignant neoplasm ([facility] of unspecified site of unspecified female 2017    Malignant neoplasm of other specified sites of female breast 2017       Past Medical History:   Diagnosis Date    Arthritis     neck    BRCA2 genetic carrier     no specifics given     Cancer ([address] ,     bilat breasts    GERD (gastroesophageal reflux disease)     Hypertension     Wears glasses      Past Surgical History:   Procedure Laterality Date    ABDOMEN SURGERY  2018     EXPLORATORY LAPAROTOMY, TOTAL ABDOMINAL HYSTERECTOMY, BSO, APPENDECTOMY, OMENTUMECTOMY, RADICAL TUMOR DEBULKING USING     CATARACT REMOVAL Bilateral     COLONOSCOPY      MASTECTOMY Left 1982    MASTECTOMY Right     [place] OFFICE/OUTPT VISIT,PROCEDURE ONLY N/A 2018    EXPLORATORY LAPAROTOMY, TOTAL ABDOMINAL HYSTERECTOMY, BSO, APPENDECTOMY, OMENTUMECTOMY, RADICAL TUMOR DEBULKING USING SONOPET. performed by Alessia Velarde MD at [address] [place] [address]  2018    UPPER GASTROINTESTINAL ENDOSCOPY  2016       Restrictions  Restrictions/Precautions  Restrictions/Precautions: General Precautions, Fall Risk, Surgical Protocols  Required Braces or Orthoses?: Yes (abdominal binder)  Required Braces or Orthoses  Spinal Other: Abdominal binder  Subjective   RN & pt agreeable toPt. Pt sitting in recliner upon arrival. Pt with family visiting.   Pt denies c/o pain reports discomfort in abdomen-not rated.  Orientation  Orientation  Overall Orientation Status: Within Normal Limits  Objective   Transfers  Sit to Stand: Stand by assistance  Stand to sit: Stand by assistance  Ambulation  Ambulation?: Yes  Ambulation 1  Surface: level tile;uneven  Device: Standard Walker (& again without SW with CGA)  Quality of Gait: fair, no LOB noted , slow paced, cautious  Distance:  (with & without AD)  Comments: very slow paced when using standard walker, a little faster paced without AD, may benefit from use of cane. Stairs/Curb  Stairs?: No  Balance  Posture: Good  Sitting - Static: Good  Sitting - Dynamic: Good  Standing - Static: Good  Standing - Dynamic: Fair;+     Assessment   Body structures, Functions, Activity limitations: Decreased functional mobility ; Decreased endurance;Decreased ROM  Assessment: pt amb with Rw with SBA, amb without AD with CGA. Pt could benefit from gait training with straight cane.   Patient Education: amb with cane  Activity Tolerance  Activity Tolerance: Patient Tolerated treatment well       Discharge Recommendations:    G-Code  OutComes Score    AM-PAC Score    Goals  Short term goals  Time Frame for Short term goals: 12 visits  Short term goal 1: pt ambulate 300 ft on level surfaces independently  Short term goal 2: pt independent in bed mobility  Short term goal 3: pt independent in functional transfers    Plan    Plan  Times per week: 10-12 visits weekly  Times per day: Daily  Current Treatment Recommendations: Strengthening, ROM, Balance Training, Functional Mobility Training, Transfer Training, Endurance Training, Gait Training, Stair training, Pain Management, Home Exercise Program, Safety Education & Training, Patient/Caregiver Education & Training, Positioning  Safety Devices  Type of devices: Left in bed, Nurse notified     Therapy Time   Individual Concurrent Group Co-treatment   Time In 1030         Time Out 1055         Minutes [address] [place] [address]

## 2018-02-10 NOTE — PLAN OF CARE
Problem: Falls - Risk of:  Goal: Will remain free from falls  Will remain free from falls   Outcome: Met This Shift    Goal: Absence of physical injury  Absence of physical injury   Outcome: Met This Shift      Problem: Falls - Risk of  Goal: Absence of falls  Outcome: Met This Shift      Problem: Pain:  Goal: Pain level will decrease  Pain level will decrease   Outcome: Met This Shift    Goal: Control of acute pain  Control of acute pain   Outcome: Ongoing    Goal: Control of chronic pain  Control of chronic pain   Outcome: Ongoing      Problem: Musculor/Skeletal Functional Status  Goal: Highest potential functional level  Outcome: Ongoing    Goal: Absence of falls  Outcome: Met This Shift

## 2018-02-11 VITALS
TEMPERATURE: 98 F | SYSTOLIC BLOOD PRESSURE: 165 MMHG | RESPIRATION RATE: 16 BRPM | DIASTOLIC BLOOD PRESSURE: 63 MMHG | HEART RATE: 79 BPM | BODY MASS INDEX: 24.45 KG/M2 | HEIGHT: 63 IN | OXYGEN SATURATION: 96 % | WEIGHT: 138.01 LBS

## 2018-02-11 LAB
ABSOLUTE EOS #: 0.1 K/UL (ref 0–0.44)
ABSOLUTE IMMATURE GRANULOCYTE: 0.04 K/UL (ref 0–0.3)
ABSOLUTE LYMPH #: 1.01 K/UL (ref 1.1–3.7)
ABSOLUTE MONO #: 0.75 K/UL (ref 0.1–1.2)
ANION GAP SERPL CALCULATED.3IONS-SCNC: 8 MMOL/L (ref 9–17)
BASOPHILS # BLD: 0 % (ref 0–2)
BASOPHILS ABSOLUTE: <0.03 K/UL (ref 0–0.2)
BUN BLDV-MCNC: 7 MG/DL (ref 8–23)
BUN/CREAT BLD: ABNORMAL (ref 9–20)
CALCIUM SERPL-MCNC: 8.1 MG/DL (ref 8.6–10.4)
CHLORIDE BLD-SCNC: 98 MMOL/L (ref 98–107)
CO2: 24 MMOL/L (ref 20–31)
CREAT SERPL-MCNC: 0.5 MG/DL (ref 0.5–0.9)
DIFFERENTIAL TYPE: ABNORMAL
EOSINOPHILS RELATIVE PERCENT: 1 % (ref 1–4)
GFR AFRICAN AMERICAN: >60 ML/MIN
GFR NON-AFRICAN AMERICAN: >60 ML/MIN
GFR SERPL CREATININE-BSD FRML MDRD: ABNORMAL ML/MIN/{1.73_M2}
GFR SERPL CREATININE-BSD FRML MDRD: ABNORMAL ML/MIN/{1.73_M2}
GLUCOSE BLD-MCNC: 130 MG/DL (ref 70–99)
HCT VFR BLD CALC: 32.2 % (ref 36.3–47.1)
HEMOGLOBIN: 9.9 G/DL (ref 11.9–15.1)
IMMATURE GRANULOCYTES: 1 %
LYMPHOCYTES # BLD: 12 % (ref 24–43)
MAGNESIUM: 1.9 MG/DL (ref 1.6–2.6)
MCH RBC QN AUTO: 27.7 PG (ref 25.2–33.5)
MCHC RBC AUTO-ENTMCNC: 30.7 G/DL (ref 28.4–34.8)
MCV RBC AUTO: 90.2 FL (ref 82.6–102.9)
MONOCYTES # BLD: 9 % (ref 3–12)
NRBC AUTOMATED: 0 PER 100 WBC
PDW BLD-RTO: 13.5 % (ref 11.8–14.4)
PHOSPHORUS: 2.4 MG/DL (ref 2.6–4.5)
PLATELET # BLD: 251 K/UL (ref 138–453)
PLATELET ESTIMATE: ABNORMAL
PMV BLD AUTO: 11.1 FL (ref 8.1–13.5)
POTASSIUM SERPL-SCNC: 4.1 MMOL/L (ref 3.7–5.3)
RBC # BLD: 3.57 M/UL (ref 3.95–5.11)
RBC # BLD: ABNORMAL 10*6/UL
SEG NEUTROPHILS: 77 % (ref 36–65)
SEGMENTED NEUTROPHILS ABSOLUTE COUNT: 6.61 K/UL (ref 1.5–8.1)
SODIUM BLD-SCNC: 130 MMOL/L (ref 135–144)
WBC # BLD: 8.5 K/UL (ref 3.5–11.3)
WBC # BLD: ABNORMAL 10*3/UL

## 2018-02-11 PROCEDURE — 36415 COLL VENOUS BLD VENIPUNCTURE: CPT

## 2018-02-11 PROCEDURE — 97116 GAIT TRAINING THERAPY: CPT

## 2018-02-11 PROCEDURE — 6370000000 HC RX 637 (ALT 250 FOR IP): Performed by: OBSTETRICS & GYNECOLOGY

## 2018-02-11 PROCEDURE — S0028 INJECTION, FAMOTIDINE, 20 MG: HCPCS | Performed by: OBSTETRICS & GYNECOLOGY

## 2018-02-11 PROCEDURE — 80048 BASIC METABOLIC PNL TOTAL CA: CPT

## 2018-02-11 PROCEDURE — 84100 ASSAY OF PHOSPHORUS: CPT

## 2018-02-11 PROCEDURE — 6360000002 HC RX W HCPCS: Performed by: OBSTETRICS & GYNECOLOGY

## 2018-02-11 PROCEDURE — 97530 THERAPEUTIC ACTIVITIES: CPT

## 2018-02-11 PROCEDURE — 2580000003 HC RX 258: Performed by: OBSTETRICS & GYNECOLOGY

## 2018-02-11 PROCEDURE — 83735 ASSAY OF MAGNESIUM: CPT

## 2018-02-11 PROCEDURE — 85025 COMPLETE CBC W/AUTO DIFF WBC: CPT

## 2018-02-11 PROCEDURE — 51798 US URINE CAPACITY MEASURE: CPT

## 2018-02-11 PROCEDURE — 2500000003 HC RX 250 WO HCPCS: Performed by: OBSTETRICS & GYNECOLOGY

## 2018-02-11 RX ORDER — PSEUDOEPHEDRINE HCL 30 MG
100 TABLET ORAL 2 TIMES DAILY
Qty: 60 CAPSULE | Refills: 0 | Status: SHIPPED | OUTPATIENT
Start: 2018-02-11 | End: 2018-04-23

## 2018-02-11 RX ORDER — HYDROCODONE BITARTRATE AND ACETAMINOPHEN 5; 325 MG/1; MG/1
1 TABLET ORAL EVERY 6 HOURS PRN
Qty: 30 TABLET | Refills: 0 | Status: SHIPPED | OUTPATIENT
Start: 2018-02-11 | End: 2018-02-18

## 2018-02-11 RX ORDER — BLOOD PRESSURE TEST KIT
1 KIT MISCELLANEOUS DAILY
Qty: 1 KIT | Refills: 0 | Status: SHIPPED | OUTPATIENT
Start: 2018-02-11 | End: 2019-03-14 | Stop reason: ALTCHOICE

## 2018-02-11 RX ORDER — ONDANSETRON 4 MG/1
4 TABLET, FILM COATED ORAL EVERY 8 HOURS PRN
Qty: 30 TABLET | Refills: 1 | Status: SHIPPED | OUTPATIENT
Start: 2018-02-11 | End: 2018-02-28 | Stop reason: HOSPADM

## 2018-02-11 RX ORDER — BLOOD PRESSURE TEST KIT
1 KIT MISCELLANEOUS DAILY
Qty: 1 KIT | Refills: 0 | Status: SHIPPED | OUTPATIENT
Start: 2018-02-11 | End: 2018-02-11

## 2018-02-11 RX ADMIN — POLYETHYLENE GLYCOL 3350 17 G: 17 POWDER, FOR SOLUTION ORAL at 08:19

## 2018-02-11 RX ADMIN — ONDANSETRON 4 MG: 2 INJECTION INTRAMUSCULAR; INTRAVENOUS at 00:13

## 2018-02-11 RX ADMIN — FAMOTIDINE 20 MG: 10 INJECTION, SOLUTION INTRAVENOUS at 08:19

## 2018-02-11 RX ADMIN — DOCUSATE SODIUM 100 MG: 100 CAPSULE ORAL at 21:36

## 2018-02-11 RX ADMIN — ENOXAPARIN SODIUM 40 MG: 40 INJECTION SUBCUTANEOUS at 08:19

## 2018-02-11 RX ADMIN — HYDROCODONE BITARTRATE AND ACETAMINOPHEN 1 TABLET: 5; 325 TABLET ORAL at 21:45

## 2018-02-11 RX ADMIN — ONDANSETRON 4 MG: 2 INJECTION INTRAMUSCULAR; INTRAVENOUS at 17:06

## 2018-02-11 RX ADMIN — METOCLOPRAMIDE 5 MG: 5 TABLET ORAL at 21:36

## 2018-02-11 RX ADMIN — METOCLOPRAMIDE 5 MG: 5 TABLET ORAL at 11:00

## 2018-02-11 RX ADMIN — METOPROLOL TARTRATE 25 MG: 25 TABLET ORAL at 08:19

## 2018-02-11 RX ADMIN — METOCLOPRAMIDE 5 MG: 5 TABLET ORAL at 16:00

## 2018-02-11 RX ADMIN — DOCUSATE SODIUM 100 MG: 100 CAPSULE ORAL at 08:19

## 2018-02-11 RX ADMIN — METOCLOPRAMIDE 5 MG: 5 TABLET ORAL at 03:54

## 2018-02-11 RX ADMIN — METOPROLOL TARTRATE 25 MG: 25 TABLET ORAL at 21:36

## 2018-02-11 RX ADMIN — Medication 10 ML: at 08:15

## 2018-02-11 RX ADMIN — ONDANSETRON 4 MG: 2 INJECTION INTRAMUSCULAR; INTRAVENOUS at 08:15

## 2018-02-11 ASSESSMENT — PAIN DESCRIPTION - PROGRESSION
CLINICAL_PROGRESSION: GRADUALLY IMPROVING

## 2018-02-11 ASSESSMENT — PAIN SCALES - GENERAL
PAINLEVEL_OUTOF10: 0
PAINLEVEL_OUTOF10: 2
PAINLEVEL_OUTOF10: 0

## 2018-02-11 NOTE — PROGRESS NOTES
Pt lost IV access, Dr. Antonio Lacy ok with writer not restarting IV at this this time as the patient is eating and drinking and IV fluids were discontinued. Will continue to monitor.

## 2018-02-11 NOTE — PROGRESS NOTES
Pt vomited moderate amount, IV restarted and Zofran given. Bladder scan showed 123ml in bladder, Dr. Misael Ramirez notified via perfect serve.

## 2018-02-11 NOTE — PLAN OF CARE
Problem: Falls - Risk of:  Goal: Will remain free from falls  Will remain free from falls   Outcome: Ongoing      Problem: Pain:  Goal: Pain level will decrease  Pain level will decrease   Outcome: Ongoing

## 2018-02-11 NOTE — PROGRESS NOTES
Gynecology Oncology Progress Note      Claudetta Corti is a 80 y.o. female No obstetric history on file., POD # 4 s/p SHADI/BSO, Appy and debulking    Patient seen and examined. . Pain is controlled. Patient is  tolerating oral intake. She is urinating with some retention. She denies any vaginal bleeding. She is  ambulating well. She is  passing flatus. She denies Fever/Chills, Chest Pain, SOB, N/V, Calf Pain. Intake/Output:   Last Shift: I/O last 3 completed shifts:  In: -   Out: 1305 [Urine:600; Drains:705]  Current Shift: No intake/output data recorded. Physical Exam:  Gen: NAD  HEENT: NCAT, EOMI, MMM  Resp: CTABL, no WRR  Card: RRR S1S2  Abd: soft, NTND, no rebound, no guarding. +BS  Incisions: Clean an dry  Ext: Mild LE edema, no calf tenderness or swelling. Lab:  Results for Zayda Odell (MRN [id number]) as of 2/11/2018 10:02   Ref.  Range 2/10/2018 07:00   Sodium Latest Ref Range: 135 - 144 mmol/L 133 (L)   Potassium Latest Ref Range: 3.7 - 5.3 mmol/L 4.0   Chloride Latest Ref Range: 98 - 107 mmol/L 102   CO2 Latest Ref Range: 20 - 31 mmol/L 23   BUN Latest Ref Range: 8 - 23 mg/dL 7 (L)   Creatinine Latest Ref Range: 0.50 - 0.90 mg/dL 0.55   Bun/Cre Ratio Latest Ref Range: 9 - 20  NOT REPORTED   Anion Gap Latest Ref Range: 9 - 17 mmol/L 8 (L)   GFR Non- Latest Ref Range: >60 mL/min >60   GFR African American Latest Ref Range: >60 mL/min >60   Magnesium Latest Ref Range: 1.6 - 2.6 mg/dL 1.9   Glucose Latest Ref Range: 70 - 99 mg/dL 106 (H)   Calcium Latest Ref Range: 8.6 - 10.4 mg/dL 7.7 (L)   Phosphorus Latest Ref Range: 2.6 - 4.5 mg/dL 2.8   GFR Comment Unknown Pend   GFR Staging Unknown NOT REPORTED   WBC Latest Ref Range: 3.5 - 11.3 k/uL 7.9   RBC Latest Ref Range: 3.95 - 5.11 m/uL 3.19 (L)   Hemoglobin Quant Latest Ref Range: 11.9 - 15.1 g/dL 9.0 (L)   Hematocrit Latest Ref Range: 36.3 - 47.1 % 29.4 (L)   MCV Latest Ref Range: 82.6 - 102.9 fL 92.2   MCH Latest Ref Range: 25.2 - 33.5 pg 28.2   MCHC Latest Ref Range: 28.4 - 34.8 g/dL 30.6   MPV Latest Ref Range: 8.1 - 13.5 fL 11.3   RDW Latest Ref Range: 11.8 - 14.4 % 13.8   Platelet Count Latest Ref Range: 138 - 453 k/uL 252   Platelet Estimate Unknown NOT REPORTED   Absolute Mono # Latest Ref Range: 0.10 - 1.20 k/uL 0.85   Eosinophils % Latest Ref Range: 1 - 4 % 5 (H)   Basophils # Latest Ref Range: 0.00 - 0.20 k/uL <0.03   Differential Type Unknown NOT REPORTED   Seg Neutrophils Latest Ref Range: 36 - 65 % 69 (H)   Segs Absolute Latest Ref Range: 1.50 - 8.10 k/uL 5.42   Lymphocytes Latest Ref Range: 24 - 43 % 15 (L)   Absolute Lymph # Latest Ref Range: 1.10 - 3.70 k/uL 1.17   Monocytes Latest Ref Range: 3 - 12 % 11   Absolute Eos # Latest Ref Range: 0.00 - 0.44 k/uL 0.38   Basophils Latest Ref Range: 0 - 2 % 0   Immature Granulocytes Latest Ref Range: 0 % 0   WBC Morphology Unknown NOT REPORTED   RBC Morphology Unknown NOT REPORTED       Assessment/Plan:  Celena Anderson 80 y.o. 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 *      Plan: Discharge home on Xarelto, zofran prn and colace. Resume home meds. If melchor is replaced due to retention, pt will f/u with PCP later this week for removal. Has f/u apt with Dr Michael Norris and Med-Onc after discharge.

## 2018-02-12 ENCOUNTER — TELEPHONE (OUTPATIENT)
Dept: FAMILY MEDICINE CLINIC | Age: 83
End: 2018-02-12

## 2018-02-12 ENCOUNTER — TELEPHONE (OUTPATIENT)
Dept: GYNECOLOGIC ONCOLOGY | Age: 83
End: 2018-02-12

## 2018-02-12 NOTE — TELEPHONE ENCOUNTER
Was discharged from hospital last night and Estephanie Otis has a few questions:    1)  went off baby asaprin when can she start that back up    2) Col Rit, does it replace the Miralax    3) Metoprolol 25 mg was on 1 tab daily while in the hospital she was increased to 1 1/2 tab BID, which does she continue    4) should she continue the demadex    5) should she continue the Zantac    6) When do you want to see her for a TCM

## 2018-02-13 PROBLEM — Z15.02 MALIGNANT NEOPLASM OF RIGHT FALLOPIAN TUBE WITH BRCA2 GENE MUTATION (HCC): Status: ACTIVE | Noted: 2018-01-15

## 2018-02-13 PROBLEM — Z15.09 MALIGNANT NEOPLASM OF RIGHT FALLOPIAN TUBE WITH BRCA2 GENE MUTATION (HCC): Chronic | Status: ACTIVE | Noted: 2018-01-15

## 2018-02-13 PROBLEM — Z15.02 MALIGNANT NEOPLASM OF RIGHT FALLOPIAN TUBE WITH BRCA2 GENE MUTATION (HCC): Chronic | Status: ACTIVE | Noted: 2018-01-15

## 2018-02-13 PROBLEM — C57.01 MALIGNANT NEOPLASM OF RIGHT FALLOPIAN TUBE WITH BRCA2 GENE MUTATION (HCC): Chronic | Status: ACTIVE | Noted: 2018-01-15

## 2018-02-13 PROBLEM — Z15.01 MALIGNANT NEOPLASM OF RIGHT FALLOPIAN TUBE WITH BRCA2 GENE MUTATION (HCC): Chronic | Status: ACTIVE | Noted: 2018-01-15

## 2018-02-13 PROBLEM — Z15.01 MALIGNANT NEOPLASM OF RIGHT FALLOPIAN TUBE WITH BRCA2 GENE MUTATION (HCC): Status: ACTIVE | Noted: 2018-01-15

## 2018-02-13 PROBLEM — C57.01 MALIGNANT NEOPLASM OF RIGHT FALLOPIAN TUBE WITH BRCA2 GENE MUTATION (HCC): Status: ACTIVE | Noted: 2018-01-15

## 2018-02-13 PROBLEM — Z15.09 MALIGNANT NEOPLASM OF RIGHT FALLOPIAN TUBE WITH BRCA2 GENE MUTATION (HCC): Status: ACTIVE | Noted: 2018-01-15

## 2018-02-15 ENCOUNTER — OFFICE VISIT (OUTPATIENT)
Dept: FAMILY MEDICINE CLINIC | Age: 83
End: 2018-02-15
Payer: MEDICARE

## 2018-02-15 VITALS
DIASTOLIC BLOOD PRESSURE: 60 MMHG | SYSTOLIC BLOOD PRESSURE: 104 MMHG | HEART RATE: 66 BPM | BODY MASS INDEX: 23.91 KG/M2 | WEIGHT: 135 LBS

## 2018-02-15 DIAGNOSIS — J90 RECURRENT PLEURAL EFFUSION ON RIGHT: ICD-10-CM

## 2018-02-15 DIAGNOSIS — C56.9 MALIGNANT NEOPLASM OF OVARY, UNSPECIFIED LATERALITY (HCC): Primary | ICD-10-CM

## 2018-02-15 DIAGNOSIS — I10 ESSENTIAL HYPERTENSION: ICD-10-CM

## 2018-02-15 DIAGNOSIS — M79.2 RADICULAR PAIN IN RIGHT ARM: ICD-10-CM

## 2018-02-15 PROCEDURE — 1111F DSCHRG MED/CURRENT MED MERGE: CPT | Performed by: FAMILY MEDICINE

## 2018-02-15 PROCEDURE — 99214 OFFICE O/P EST MOD 30 MIN: CPT | Performed by: FAMILY MEDICINE

## 2018-02-15 NOTE — PROGRESS NOTES
Post-Discharge Transitional Care Management Services      Celena Greenwood   YOB: 1932    Date of Office Visit:  2/15/2018  Date of Hospital Admission: 2/7/18  Date of Hospital Discharge: 2/11/18  [facility] Risk Score [risk of hospital readmission >=10  medium risk (chance of readmission ~ 12%) >14  high risk (chance of readmission ~18%)]:Risk Score: 1    Care management risk score Rising risk (score 2-5) and Complex Care (Scores >=6): 1       Patient Active Problem List   Diagnosis    HTN (hypertension)    Breast cancer ([facility]    Malignant neoplasm ([facility] [address] of unspecified site of unspecified female    Malignant neoplasm of other specified sites of female breast    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 ([facility]    BRCA2 genetic carrier    Pleural effusion    Pelvic mass    Ex Lap, SHADI, BSO, Omntectomy, Appy, Debulking 2/7/18       Allergies   Allergen Reactions    Amoxicillin      Hives         Medications listed as ordered at the time of discharge from hospital   Celena Anderson   Home Medication Instructions BENJAMIN:    Printed on:02/18/18 1492   Medication Information                      Blood Pressure KIT  1 Units by Does not apply route daily             diphenhydrAMINE (BENADRYL) 25 MG capsule  Take 37.5 mg by mouth nightly              docusate (COLACE, DULCOLAX) 100 MG CAPS  Take 100 mg by mouth 2 times daily             HYDROcodone-acetaminophen (NORCO) 5-325 MG per tablet  Take 1 tablet by mouth every 6 hours as needed for Pain for up to 7 days.              melatonin 3 MG TABS tablet  Take 3 mg by mouth nightly              metoprolol tartrate (LOPRESSOR) 25 MG tablet  TAKE 1 TABLET TWICE DAILY             ondansetron (ZOFRAN) 4 MG tablet  Take 1 tablet by mouth every 8 hours as needed for Nausea             polyethylene glycol (GLYCOLAX) powder  Take 17 g by mouth daily             ranitidine (ZANTAC) 150 MG tablet  Take 0.5 tablets by mouth 2 times daily Abdominal: Soft. Bowel sounds are normal. She exhibits no distension. There is tenderness (mild lower abd tenderess). Left lower abd trochar site has slight area of erythema on upper edge and scant serous drainge, no tenderness noted   Musculoskeletal: She exhibits no edema or tenderness. Right upper lateral arm is nontender   Neurological: She is alert and oriented to person, place, and time. No cranial nerve deficit. Psychiatric: She has a normal mood and affect. Assessment/Plan:  Celena was seen today for follow-up from hospital.    Diagnoses and all orders for this visit:    Malignant neoplasm of ovary, unspecified laterality ([facility]  -     ID DISCHARGE MEDS RECONCILED W/ CURRENT OUTPATIENT MED LIST  -     Electrolyte Panel; Future  -     BUN; Future  -     Creatinine, Serum; Future  -     CBC Auto Differential; Future  Continue to follow up with Dr. Onel Nguyễn and Dr. Oliveira Neither  Essential hypertension  -     Electrolyte Panel; Future  -     BUN; Future  -     Creatinine, Serum; Future  -     CBC Auto Differential; Future  In light of the decreased BP since her discharge will decrease the metoprolol to 1 tab twice daily and monitor the BP  Radicular pain in right arm  -     XR CERVICAL SPINE (2-3 VIEWS); Future  Sx are radicular in description. Will check an xray of the spine as and initial step.   If this is normal then will monitor as further imaging will be included in the malignancy work up with oncology - woud be concerned with ruling out spinal mets  Recurrent pleural effusion on right  Will monitor and treat if symptomatic- definitive medical management of the malignancy will hopefully help with this    Other orders  -     metoprolol tartrate (LOPRESSOR) 25 MG tablet; TAKE 1 TABLET TWICE DAILY  -     XR CERVICAL SPINE (2-3 VIEWS)          Medical Decision Making: moderate complexity

## 2018-02-28 ENCOUNTER — OFFICE VISIT (OUTPATIENT)
Dept: GYNECOLOGIC ONCOLOGY | Age: 83
End: 2018-02-28

## 2018-02-28 VITALS
HEIGHT: 63 IN | OXYGEN SATURATION: 100 % | HEART RATE: 60 BPM | TEMPERATURE: 96.6 F | DIASTOLIC BLOOD PRESSURE: 70 MMHG | SYSTOLIC BLOOD PRESSURE: 132 MMHG | BODY MASS INDEX: 23.67 KG/M2 | WEIGHT: 133.6 LBS

## 2018-02-28 DIAGNOSIS — Z15.02: ICD-10-CM

## 2018-02-28 DIAGNOSIS — Z15.01: ICD-10-CM

## 2018-02-28 DIAGNOSIS — Z15.09 BRCA2 GENETIC CARRIER: ICD-10-CM

## 2018-02-28 DIAGNOSIS — Z15.01 BRCA2 GENETIC CARRIER: ICD-10-CM

## 2018-02-28 DIAGNOSIS — Z15.09: ICD-10-CM

## 2018-02-28 DIAGNOSIS — C57.01: ICD-10-CM

## 2018-02-28 DIAGNOSIS — Z08 ENCOUNTER FOR POSTOPERATIVE EXAMINATION AFTER SURGERY FOR MALIGNANT NEOPLASM: Primary | ICD-10-CM

## 2018-02-28 PROCEDURE — 99024 POSTOP FOLLOW-UP VISIT: CPT | Performed by: NURSE PRACTITIONER

## 2018-02-28 RX ORDER — ASPIRIN 81 MG/1
81 TABLET ORAL NIGHTLY
COMMUNITY
End: 2020-06-15

## 2018-02-28 ASSESSMENT — ENCOUNTER SYMPTOMS
SHORTNESS OF BREATH: 0
ABDOMINAL DISTENTION: 0
COUGH: 0
ABDOMINAL PAIN: 0
CONSTIPATION: 0

## 2018-04-11 PROBLEM — Z98.890 POST-OPERATIVE STATE: Status: RESOLVED | Noted: 2018-02-07 | Resolved: 2018-04-11

## 2018-04-15 LAB
AEROBIC CULTURE: NORMAL
C-REACTIVE PROTEIN: 1.7 MG/DL
CULTURE, ANAEROBE: NORMAL
SEDIMENTATION RATE, ERYTHROCYTE: 22 MM/HR

## 2018-04-16 LAB
AGE FOR GFR: 86
ANION GAP SERPL CALCULATED.3IONS-SCNC: 13 MMOL/L
B-TYPE NATRIURETIC PEPTIDE: 465 PG/ML
BUN BLDV-MCNC: 15 MG/DL
CHLORIDE BLD-SCNC: 104 MMOL/L
CO2: 25 MMOL/L
CREAT SERPL-MCNC: 0.9 MG/DL
DIFFERENTIAL: MANUAL DIFF
EGFR BF: 72 ML/MIN/1.73 M2
EGFR BM: 97 ML/MIN/1.73 M2
EGFR WF: 59 ML/MIN/1.73 M2
EGFR WM: 80 ML/MIN/1.73 M2
EOSINOPHIL # BLD: 0 %
HCT VFR BLD CALC: 29.3 %
HEMOGLOBIN: 10.1 G/DL
LYMPHOCYTES # BLD: 9 %
MCH RBC QN AUTO: 30.8 PG
MCHC RBC AUTO-ENTMCNC: 34.3 G/DL
MCV RBC AUTO: 89.7 FL
MONOCYTES # BLD: 4 %
NEUTROPHILS: 78 %
PATHOLOGIST REVIEW: ABNORMAL
PDW BLD-RTO: 15.2 %
PLATELET # BLD: 169 THOU/MM3
PMV BLD AUTO: 8.6 FL
POTASSIUM SERPL-SCNC: 4 MMOL/L
RBC # BLD: 3.27 M/UL
SODIUM BLD-SCNC: 138 MMOL/L
WBC # BLD: 42.8 THOU/ML3

## 2018-04-17 ENCOUNTER — TELEPHONE (OUTPATIENT)
Dept: FAMILY MEDICINE CLINIC | Age: 83
End: 2018-04-17

## 2018-04-17 DIAGNOSIS — N30.00 ACUTE CYSTITIS WITHOUT HEMATURIA: Primary | ICD-10-CM

## 2018-04-17 LAB
AGE FOR GFR: 86
ANION GAP SERPL CALCULATED.3IONS-SCNC: 14 MMOL/L
BUN BLDV-MCNC: 17 MG/DL
CHLORIDE BLD-SCNC: 100 MMOL/L
CO2: 27 MMOL/L
CREAT SERPL-MCNC: 1 MG/DL
EGFR BF: 64 ML/MIN/1.73 M2
EGFR BM: 86 ML/MIN/1.73 M2
EGFR WF: 53 ML/MIN/1.73 M2
EGFR WM: 71 ML/MIN/1.73 M2
POTASSIUM SERPL-SCNC: 3.9 MMOL/L
SODIUM BLD-SCNC: 137 MMOL/L

## 2018-04-17 RX ORDER — AMOXICILLIN AND CLAVULANATE POTASSIUM 875; 125 MG/1; MG/1
1 TABLET, FILM COATED ORAL 2 TIMES DAILY
Qty: 14 TABLET | Refills: 0 | Status: SHIPPED | OUTPATIENT
Start: 2018-04-17 | End: 2018-04-23 | Stop reason: CLARIF

## 2018-04-17 RX ORDER — CIPROFLOXACIN 500 MG/1
500 TABLET, FILM COATED ORAL 2 TIMES DAILY
Qty: 14 TABLET | Refills: 0 | Status: SHIPPED | OUTPATIENT
Start: 2018-04-17 | End: 2018-04-24

## 2018-04-23 ENCOUNTER — TELEPHONE (OUTPATIENT)
Dept: FAMILY MEDICINE CLINIC | Age: 83
End: 2018-04-23

## 2018-04-23 ENCOUNTER — OFFICE VISIT (OUTPATIENT)
Dept: FAMILY MEDICINE CLINIC | Age: 83
End: 2018-04-23
Payer: MEDICARE

## 2018-04-23 VITALS
WEIGHT: 139.5 LBS | SYSTOLIC BLOOD PRESSURE: 110 MMHG | BODY MASS INDEX: 24.72 KG/M2 | HEART RATE: 78 BPM | DIASTOLIC BLOOD PRESSURE: 70 MMHG

## 2018-04-23 DIAGNOSIS — L53.9: ICD-10-CM

## 2018-04-23 DIAGNOSIS — N30.00 ACUTE CYSTITIS WITHOUT HEMATURIA: Primary | ICD-10-CM

## 2018-04-23 LAB
BASOPHILS # BLD: 0.12 THOU/MM3
DIFFERENTIAL: AUTOMATED DIFF
EOSINOPHIL # BLD: 0.2 THOU/MM3
HCT VFR BLD CALC: 33.3 %
HEMOGLOBIN: 10.3 G/DL
LYMPHOCYTES # BLD: 1.66 THOU/MM3
MCH RBC QN AUTO: 28.2 PG
MCHC RBC AUTO-ENTMCNC: 31.1 G/DL
MCV RBC AUTO: 90.8 FL
MONOCYTES # BLD: 1 THOU/MM3
NEUTROPHILS: 11.88 THOU/MM3
PDW BLD-RTO: 15.8 %
PLATELET # BLD: 143 THOU/MM3
PMV BLD AUTO: 10 FL
RBC # BLD: 3.66 M/UL
WBC # BLD: 14.86 THOU/ML3

## 2018-04-23 PROCEDURE — 1111F DSCHRG MED/CURRENT MED MERGE: CPT | Performed by: FAMILY MEDICINE

## 2018-04-23 PROCEDURE — 99213 OFFICE O/P EST LOW 20 MIN: CPT | Performed by: FAMILY MEDICINE

## 2018-04-24 ENCOUNTER — TELEPHONE (OUTPATIENT)
Dept: FAMILY MEDICINE CLINIC | Age: 83
End: 2018-04-24

## 2018-04-29 ASSESSMENT — ENCOUNTER SYMPTOMS
COUGH: 0
SHORTNESS OF BREATH: 0
CHOKING: 0

## 2018-05-21 ENCOUNTER — OFFICE VISIT (OUTPATIENT)
Dept: FAMILY MEDICINE CLINIC | Age: 83
End: 2018-05-21
Payer: MEDICARE

## 2018-05-21 VITALS
SYSTOLIC BLOOD PRESSURE: 116 MMHG | HEART RATE: 63 BPM | OXYGEN SATURATION: 97 % | WEIGHT: 140.7 LBS | DIASTOLIC BLOOD PRESSURE: 74 MMHG | BODY MASS INDEX: 24.93 KG/M2

## 2018-05-21 DIAGNOSIS — R41.3 MEMORY LOSS: ICD-10-CM

## 2018-05-21 DIAGNOSIS — C57.01 MALIGNANT NEOPLASM OF RIGHT FALLOPIAN TUBE WITH BRCA2 GENE MUTATION (HCC): Chronic | ICD-10-CM

## 2018-05-21 DIAGNOSIS — Z15.02 MALIGNANT NEOPLASM OF RIGHT FALLOPIAN TUBE WITH BRCA2 GENE MUTATION (HCC): Chronic | ICD-10-CM

## 2018-05-21 DIAGNOSIS — Z15.01 MALIGNANT NEOPLASM OF RIGHT FALLOPIAN TUBE WITH BRCA2 GENE MUTATION (HCC): Chronic | ICD-10-CM

## 2018-05-21 DIAGNOSIS — R60.0 BILATERAL LEG EDEMA: ICD-10-CM

## 2018-05-21 DIAGNOSIS — Z15.09 MALIGNANT NEOPLASM OF RIGHT FALLOPIAN TUBE WITH BRCA2 GENE MUTATION (HCC): Chronic | ICD-10-CM

## 2018-05-21 PROCEDURE — 1090F PRES/ABSN URINE INCON ASSESS: CPT | Performed by: FAMILY MEDICINE

## 2018-05-21 PROCEDURE — 99214 OFFICE O/P EST MOD 30 MIN: CPT | Performed by: FAMILY MEDICINE

## 2018-05-21 PROCEDURE — G8420 CALC BMI NORM PARAMETERS: HCPCS | Performed by: FAMILY MEDICINE

## 2018-05-21 PROCEDURE — 1036F TOBACCO NON-USER: CPT | Performed by: FAMILY MEDICINE

## 2018-05-21 PROCEDURE — 1123F ACP DISCUSS/DSCN MKR DOCD: CPT | Performed by: FAMILY MEDICINE

## 2018-05-21 PROCEDURE — 4040F PNEUMOC VAC/ADMIN/RCVD: CPT | Performed by: FAMILY MEDICINE

## 2018-05-21 PROCEDURE — G8427 DOCREV CUR MEDS BY ELIG CLIN: HCPCS | Performed by: FAMILY MEDICINE

## 2018-05-21 RX ORDER — LORAZEPAM 1 MG/1
TABLET ORAL
Refills: 0 | COMMUNITY
Start: 2018-03-26 | End: 2019-03-14 | Stop reason: ALTCHOICE

## 2018-05-21 RX ORDER — TORSEMIDE 10 MG/1
10 TABLET ORAL
Qty: 30 TABLET | Refills: 1 | Status: SHIPPED | OUTPATIENT
Start: 2018-05-21 | End: 2018-11-14 | Stop reason: SDUPTHER

## 2018-05-21 RX ORDER — PROCHLORPERAZINE MALEATE 10 MG
TABLET ORAL
Refills: 0 | COMMUNITY
Start: 2018-03-26 | End: 2019-03-14 | Stop reason: ALTCHOICE

## 2018-05-22 ENCOUNTER — TELEPHONE (OUTPATIENT)
Dept: FAMILY MEDICINE CLINIC | Age: 83
End: 2018-05-22

## 2018-05-29 ENCOUNTER — TELEPHONE (OUTPATIENT)
Dept: GYNECOLOGIC ONCOLOGY | Age: 83
End: 2018-05-29

## 2018-05-29 LAB
TSH SERPL DL<=0.05 MIU/L-ACNC: 2.64 MIU/ML
VITAMIN B-12: >1000 PG/ML

## 2018-06-03 ASSESSMENT — ENCOUNTER SYMPTOMS: SHORTNESS OF BREATH: 0

## 2018-06-12 ENCOUNTER — TELEPHONE (OUTPATIENT)
Dept: FAMILY MEDICINE CLINIC | Age: 83
End: 2018-06-12

## 2018-07-11 ENCOUNTER — OFFICE VISIT (OUTPATIENT)
Dept: FAMILY MEDICINE CLINIC | Age: 83
End: 2018-07-11
Payer: MEDICARE

## 2018-07-11 VITALS
HEART RATE: 57 BPM | OXYGEN SATURATION: 96 % | SYSTOLIC BLOOD PRESSURE: 130 MMHG | WEIGHT: 141.5 LBS | BODY MASS INDEX: 25.07 KG/M2 | DIASTOLIC BLOOD PRESSURE: 68 MMHG

## 2018-07-11 DIAGNOSIS — R05.9 COUGH: ICD-10-CM

## 2018-07-11 DIAGNOSIS — K59.04 CHRONIC IDIOPATHIC CONSTIPATION: ICD-10-CM

## 2018-07-11 DIAGNOSIS — L30.9 HAND DERMATITIS: ICD-10-CM

## 2018-07-11 DIAGNOSIS — I10 ESSENTIAL HYPERTENSION: Primary | ICD-10-CM

## 2018-07-11 PROCEDURE — 99214 OFFICE O/P EST MOD 30 MIN: CPT | Performed by: FAMILY MEDICINE

## 2018-07-11 PROCEDURE — 1090F PRES/ABSN URINE INCON ASSESS: CPT | Performed by: FAMILY MEDICINE

## 2018-07-11 PROCEDURE — 1123F ACP DISCUSS/DSCN MKR DOCD: CPT | Performed by: FAMILY MEDICINE

## 2018-07-11 PROCEDURE — G8427 DOCREV CUR MEDS BY ELIG CLIN: HCPCS | Performed by: FAMILY MEDICINE

## 2018-07-11 PROCEDURE — G8417 CALC BMI ABV UP PARAM F/U: HCPCS | Performed by: FAMILY MEDICINE

## 2018-07-11 PROCEDURE — 1101F PT FALLS ASSESS-DOCD LE1/YR: CPT | Performed by: FAMILY MEDICINE

## 2018-07-11 PROCEDURE — 1036F TOBACCO NON-USER: CPT | Performed by: FAMILY MEDICINE

## 2018-07-11 PROCEDURE — 4040F PNEUMOC VAC/ADMIN/RCVD: CPT | Performed by: FAMILY MEDICINE

## 2018-07-11 RX ORDER — POLYETHYLENE GLYCOL 3350 17 G/17G
17 POWDER, FOR SOLUTION ORAL DAILY
Qty: 1 BOTTLE | Refills: 5 | Status: SHIPPED | OUTPATIENT
Start: 2018-07-11 | End: 2019-01-07 | Stop reason: SDUPTHER

## 2018-07-11 RX ORDER — RANITIDINE 150 MG/1
75 TABLET ORAL 2 TIMES DAILY
Qty: 60 TABLET | Refills: 11 | Status: SHIPPED | OUTPATIENT
Start: 2018-07-11 | End: 2019-03-14 | Stop reason: ALTCHOICE

## 2018-07-11 RX ORDER — FLUTICASONE PROPIONATE 0.05 MG/G
OINTMENT TOPICAL 2 TIMES DAILY
COMMUNITY
End: 2018-07-11 | Stop reason: SDUPTHER

## 2018-07-11 RX ORDER — FLUTICASONE PROPIONATE 0.05 MG/G
OINTMENT TOPICAL 2 TIMES DAILY
Qty: 60 G | Refills: 3 | Status: SHIPPED | OUTPATIENT
Start: 2018-07-11 | End: 2018-11-07 | Stop reason: SDUPTHER

## 2018-07-11 ASSESSMENT — ENCOUNTER SYMPTOMS
SHORTNESS OF BREATH: 0
ABDOMINAL PAIN: 0
DIARRHEA: 0
BLURRED VISION: 0
ORTHOPNEA: 0
WHEEZING: 0
CONSTIPATION: 0
COUGH: 0

## 2018-07-11 NOTE — PROGRESS NOTES
Review of Systems   Respiratory: Negative for cough, shortness of breath and wheezing. Cardiovascular: Positive for leg swelling (states I have swelling in my right ankle). Negative for chest pain and palpitations. Gastrointestinal: Negative for abdominal pain, constipation and diarrhea. States had diarrhea and vomiting just on Monday of this week. Genitourinary: Negative for frequency and urgency. Depends on if I take my water pill     Neurological: Negative for dizziness and headaches. States I have a little bit more trouble hearing/understanding what people are saying mostly when watching tv. Had a Chemo treatment yesterday.
Take 81 mg by mouth nightly      metoprolol tartrate (LOPRESSOR) 25 MG tablet TAKE 1 TABLET TWICE DAILY 180 tablet 3    Blood Pressure KIT 1 Units by Does not apply route daily 1 kit 0    vitamin D (CHOLECALCIFEROL) 1000 UNIT TABS tablet Take 1,000 Units by mouth daily      melatonin 3 MG TABS tablet Take 3 mg by mouth nightly       diphenhydrAMINE (BENADRYL) 25 MG capsule Take 37.5 mg by mouth nightly        No current facility-administered medications for this visit. Allergies   Allergen Reactions    Amoxicillin      Hives         Health Maintenance   Topic Date Due    Shingles Vaccine (1 of 2 - 2 Dose Series) 01/09/1982    Flu vaccine (1) 09/01/2018    Potassium monitoring  07/10/2019    Creatinine monitoring  07/10/2019    DTaP/Tdap/Td vaccine (2 - Td) 01/08/2028    Pneumococcal low/med risk  Completed       Subjective:      Review of Systems   Constitutional: Negative for malaise/fatigue. Eyes: Negative for blurred vision. Respiratory: Negative for shortness of breath. Cardiovascular: Negative for chest pain, palpitations, orthopnea and PND. Musculoskeletal: Negative for neck pain. Neurological: Negative for headaches. Respiratory: Negative for cough, shortness of breath and wheezing. Cardiovascular: Positive for leg swelling (states I have swelling in my right ankle). Negative for chest pain and palpitations. Gastrointestinal: Negative for abdominal pain, constipation and diarrhea. States had diarrhea and vomiting just on Monday of this week. Genitourinary: Negative for frequency and urgency. Depends on if I take my water pill     Neurological: Negative for dizziness and headaches. Objective:     /68   Pulse 57   Wt 141 lb 8 oz (64.2 kg)   LMP  (LMP Unknown)   SpO2 96%   BMI 25.07 kg/m²     Physical Exam   Constitutional: She is oriented to person, place, and time. She appears well-developed and well-nourished.    HENT:   Head: Normocephalic

## 2018-08-14 ENCOUNTER — TELEPHONE (OUTPATIENT)
Dept: FAMILY MEDICINE CLINIC | Age: 83
End: 2018-08-14

## 2018-08-14 NOTE — TELEPHONE ENCOUNTER
Ingrid Bragg stopped in the office stating Mom didn't have tx last week d/t a decreased WBC. Today she is down having tx but her level is still low at 3.76. I would like to encourage her not to go out, to try to prevent her from getting sick. It might go better coming from the doctor, what is your opinion on this?     Ingrid Bragg: [phone]

## 2018-09-12 ENCOUNTER — OFFICE VISIT (OUTPATIENT)
Dept: FAMILY MEDICINE CLINIC | Age: 83
End: 2018-09-12
Payer: MEDICARE

## 2018-09-12 VITALS
WEIGHT: 143.31 LBS | BODY MASS INDEX: 25.39 KG/M2 | OXYGEN SATURATION: 98 % | HEART RATE: 57 BPM | SYSTOLIC BLOOD PRESSURE: 126 MMHG | DIASTOLIC BLOOD PRESSURE: 66 MMHG

## 2018-09-12 DIAGNOSIS — C57.01 MALIGNANT NEOPLASM OF RIGHT FALLOPIAN TUBE WITH BRCA2 GENE MUTATION (HCC): ICD-10-CM

## 2018-09-12 DIAGNOSIS — Z15.01 MALIGNANT NEOPLASM OF RIGHT FALLOPIAN TUBE WITH BRCA2 GENE MUTATION (HCC): ICD-10-CM

## 2018-09-12 DIAGNOSIS — T50.905A ADVERSE EFFECT OF DRUG, INITIAL ENCOUNTER: Primary | ICD-10-CM

## 2018-09-12 DIAGNOSIS — Z15.09 MALIGNANT NEOPLASM OF RIGHT FALLOPIAN TUBE WITH BRCA2 GENE MUTATION (HCC): ICD-10-CM

## 2018-09-12 DIAGNOSIS — Z15.02 MALIGNANT NEOPLASM OF RIGHT FALLOPIAN TUBE WITH BRCA2 GENE MUTATION (HCC): ICD-10-CM

## 2018-09-12 PROCEDURE — G8427 DOCREV CUR MEDS BY ELIG CLIN: HCPCS | Performed by: FAMILY MEDICINE

## 2018-09-12 PROCEDURE — 1101F PT FALLS ASSESS-DOCD LE1/YR: CPT | Performed by: FAMILY MEDICINE

## 2018-09-12 PROCEDURE — 4040F PNEUMOC VAC/ADMIN/RCVD: CPT | Performed by: FAMILY MEDICINE

## 2018-09-12 PROCEDURE — G8417 CALC BMI ABV UP PARAM F/U: HCPCS | Performed by: FAMILY MEDICINE

## 2018-09-12 PROCEDURE — 1036F TOBACCO NON-USER: CPT | Performed by: FAMILY MEDICINE

## 2018-09-12 PROCEDURE — 1090F PRES/ABSN URINE INCON ASSESS: CPT | Performed by: FAMILY MEDICINE

## 2018-09-12 PROCEDURE — 99213 OFFICE O/P EST LOW 20 MIN: CPT | Performed by: FAMILY MEDICINE

## 2018-09-12 PROCEDURE — 1123F ACP DISCUSS/DSCN MKR DOCD: CPT | Performed by: FAMILY MEDICINE

## 2018-09-12 RX ORDER — HYDROXYZINE PAMOATE 25 MG/1
25 CAPSULE ORAL 3 TIMES DAILY PRN
Qty: 30 CAPSULE | Refills: 1 | Status: SHIPPED | OUTPATIENT
Start: 2018-09-12 | End: 2018-10-15 | Stop reason: SDUPTHER

## 2018-09-12 RX ORDER — TRIAMCINOLONE ACETONIDE 40 MG/ML
40 INJECTION, SUSPENSION INTRA-ARTICULAR; INTRAMUSCULAR ONCE
Status: COMPLETED | OUTPATIENT
Start: 2018-09-12 | End: 2018-09-12

## 2018-09-12 RX ADMIN — TRIAMCINOLONE ACETONIDE 40 MG: 40 INJECTION, SUSPENSION INTRA-ARTICULAR; INTRAMUSCULAR at 13:10

## 2018-09-12 NOTE — PROGRESS NOTES
Date    ABDOMEN SURGERY  02/07/2018     EXPLORATORY LAPAROTOMY, TOTAL ABDOMINAL HYSTERECTOMY, BSO, APPENDECTOMY, OMENTUMECTOMY, RADICAL TUMOR DEBULKING USING     CATARACT REMOVAL Bilateral     COLONOSCOPY      MASTECTOMY Left 1982    MASTECTOMY Right 2006    CA OFFICE/OUTPT VISIT,PROCEDURE ONLY N/A 2/7/2018    EXPLORATORY LAPAROTOMY, TOTAL ABDOMINAL HYSTERECTOMY, BSO, APPENDECTOMY, OMENTUMECTOMY, RADICAL TUMOR DEBULKING USING SONOPET. performed by Boyd Alba MD at [address] [place] [address]  02/07/2018    UPPER GASTROINTESTINAL ENDOSCOPY  07/2016       Family History   Problem Relation Age of Onset    Breast Cancer Mother         breast cancer    Other Mother         gallstones, water retention    Other Father         parkinsons    Breast Cancer Other         states sibling    Hypertension Other         states sibling     Osteoarthritis Other         states sibling had Severe     Stroke Sister     Endometrial Cancer Sister     Breast Cancer Sister     BRCA 1/2 Sister     Hypertension Sister     Breast Cancer Daughter        Social History   Substance Use Topics    Smoking status: Never Smoker    Smokeless tobacco: Never Used    Alcohol use 0.6 oz/week     1 Glasses of wine per week      Current Outpatient Prescriptions   Medication Sig Dispense Refill    hydrOXYzine (VISTARIL) 25 MG capsule Take 1 capsule by mouth 3 times daily as needed for Itching 30 capsule 1    polyethylene glycol (GLYCOLAX) powder Take 17 g by mouth daily 1 Bottle 5    fluticasone (CUTIVATE) 0.005 % ointment Apply topically 2 times daily Apply topically 2 times daily.  60 g 3    ranitidine (ZANTAC) 150 MG tablet Take 0.5 tablets by mouth 2 times daily 60 tablet 11    torsemide (DEMADEX) 10 MG tablet Take 1 tablet by mouth three times a week As needed 30 tablet 1    aspirin (ASPIRIN ADULT LOW DOSE) 81 MG EC tablet Take 81 mg by mouth nightly      metoprolol tartrate (LOPRESSOR) 25 MG tablet TAKE 1 TABLET TWICE DAILY 180 tablet 3    Blood Pressure KIT 1 Units by Does not apply route daily 1 kit 0    vitamin D (CHOLECALCIFEROL) 1000 UNIT TABS tablet Take 1,000 Units by mouth daily      melatonin 3 MG TABS tablet Take 3 mg by mouth nightly       diphenhydrAMINE (BENADRYL) 25 MG capsule Take 37.5 mg by mouth nightly       LORazepam (ATIVAN) 1 MG tablet take 1 tablet every 4 to 6 hours if needed for nausea  0    prochlorperazine (COMPAZINE) 10 MG tablet take 1 tablet every 4 to 6 hours if needed for nausea  0     No current facility-administered medications for this visit. Allergies   Allergen Reactions    Amoxicillin      Hives         Health Maintenance   Topic Date Due    Shingles Vaccine (1 of 2 - 2 Dose Series) 10/04/2014    Flu vaccine (1) 09/01/2018    Potassium monitoring  08/28/2019    Creatinine monitoring  08/28/2019    DTaP/Tdap/Td vaccine (2 - Td) 01/08/2028    Pneumococcal low/med risk  Completed       Subjective:      Review of Systems   Constitutional: Positive for fatigue. HENT: Negative for congestion and rhinorrhea. Respiratory: Negative for shortness of breath. Gastrointestinal: Negative for anorexia. Skin: Positive for rash. Objective:     /66   Pulse 57   Wt 143 lb 5 oz (65 kg)   LMP  (LMP Unknown)   SpO2 98%   BMI 25.39 kg/m²     Physical Exam   Constitutional: She is oriented to person, place, and time. She appears well-developed and well-nourished. Cardiovascular: Normal rate and regular rhythm. Pulmonary/Chest: Effort normal.   Neurological: She is alert and oriented to person, place, and time. Skin:        Psychiatric: She has a normal mood and affect. Her behavior is normal. Judgment and thought content normal.   Nursing note and vitals reviewed. Assessment/Plan:      Diagnosis Orders   1. Adverse effect of drug, initial encounter  triamcinolone acetonide (KENALOG-40) injection 40 mg   2.  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 Will hopefully resolve with the treatment and not recur. Will also forward to Dr. Flower Duong in consideration if further treatment needed    Lab Results   Component Value Date    WBC 3.31 (L) 09/11/2018    HGB 9.3 (L) 09/11/2018    HCT 27.7 (L) 09/11/2018     09/11/2018    ALT 22 08/28/2018    AST 22 08/28/2018     08/28/2018    K 4.2 08/28/2018     08/28/2018    CREATININE 0.9 08/28/2018    BUN 21 (H) 08/28/2018    CO2 26 08/28/2018    TSH 2.64 05/29/2018    INR 1.0 04/14/2018       Return for As scheduled. Patient given educational materials - see patient instructions. Discussed use, benefit, and side effects of prescribed medications. All patient questions answered. Pt voiced understanding. Reviewed health maintenance. Instructed to continue current medications, diet and exercise. Patient agreed with treatment plan. Follow up as directed.      Electronically signed by Da Isbell MD on 9/16/2018

## 2018-09-16 ASSESSMENT — ENCOUNTER SYMPTOMS
RHINORRHEA: 0
SHORTNESS OF BREATH: 0

## 2018-10-08 ENCOUNTER — NURSE ONLY (OUTPATIENT)
Dept: FAMILY MEDICINE CLINIC | Age: 83
End: 2018-10-08
Payer: MEDICARE

## 2018-10-08 VITALS — TEMPERATURE: 97.8 F

## 2018-10-08 DIAGNOSIS — Z23 NEED FOR PROPHYLACTIC VACCINATION AND INOCULATION AGAINST INFLUENZA: Primary | ICD-10-CM

## 2018-10-08 PROCEDURE — 90662 IIV NO PRSV INCREASED AG IM: CPT | Performed by: FAMILY MEDICINE

## 2018-10-08 PROCEDURE — G0008 ADMIN INFLUENZA VIRUS VAC: HCPCS | Performed by: FAMILY MEDICINE

## 2018-10-15 ENCOUNTER — TELEPHONE (OUTPATIENT)
Dept: FAMILY MEDICINE CLINIC | Age: 83
End: 2018-10-15

## 2018-10-15 NOTE — TELEPHONE ENCOUNTER
Still has some itching, arms, legs and neck.  Was wondering if you would refill the \"green pill\" for itching

## 2018-10-16 RX ORDER — HYDROXYZINE PAMOATE 25 MG/1
25 CAPSULE ORAL 3 TIMES DAILY PRN
Qty: 30 CAPSULE | Refills: 1 | Status: SHIPPED | OUTPATIENT
Start: 2018-10-16 | End: 2018-11-07 | Stop reason: SDUPTHER

## 2018-11-07 ENCOUNTER — OFFICE VISIT (OUTPATIENT)
Dept: FAMILY MEDICINE CLINIC | Age: 83
End: 2018-11-07
Payer: MEDICARE

## 2018-11-07 VITALS
WEIGHT: 146.25 LBS | OXYGEN SATURATION: 97 % | HEART RATE: 54 BPM | DIASTOLIC BLOOD PRESSURE: 68 MMHG | BODY MASS INDEX: 25.91 KG/M2 | SYSTOLIC BLOOD PRESSURE: 120 MMHG

## 2018-11-07 DIAGNOSIS — Z15.02 MALIGNANT NEOPLASM OF RIGHT FALLOPIAN TUBE WITH BRCA2 GENE MUTATION (HCC): ICD-10-CM

## 2018-11-07 DIAGNOSIS — L30.9 HAND DERMATITIS: ICD-10-CM

## 2018-11-07 DIAGNOSIS — Z15.01 MALIGNANT NEOPLASM OF RIGHT FALLOPIAN TUBE WITH BRCA2 GENE MUTATION (HCC): ICD-10-CM

## 2018-11-07 DIAGNOSIS — Z15.09 MALIGNANT NEOPLASM OF RIGHT FALLOPIAN TUBE WITH BRCA2 GENE MUTATION (HCC): ICD-10-CM

## 2018-11-07 DIAGNOSIS — F51.01 PRIMARY INSOMNIA: ICD-10-CM

## 2018-11-07 DIAGNOSIS — I10 ESSENTIAL HYPERTENSION: Primary | ICD-10-CM

## 2018-11-07 DIAGNOSIS — C57.01 MALIGNANT NEOPLASM OF RIGHT FALLOPIAN TUBE WITH BRCA2 GENE MUTATION (HCC): ICD-10-CM

## 2018-11-07 PROCEDURE — G8482 FLU IMMUNIZE ORDER/ADMIN: HCPCS | Performed by: FAMILY MEDICINE

## 2018-11-07 PROCEDURE — G8427 DOCREV CUR MEDS BY ELIG CLIN: HCPCS | Performed by: FAMILY MEDICINE

## 2018-11-07 PROCEDURE — 1101F PT FALLS ASSESS-DOCD LE1/YR: CPT | Performed by: FAMILY MEDICINE

## 2018-11-07 PROCEDURE — G8417 CALC BMI ABV UP PARAM F/U: HCPCS | Performed by: FAMILY MEDICINE

## 2018-11-07 PROCEDURE — 1090F PRES/ABSN URINE INCON ASSESS: CPT | Performed by: FAMILY MEDICINE

## 2018-11-07 PROCEDURE — 1036F TOBACCO NON-USER: CPT | Performed by: FAMILY MEDICINE

## 2018-11-07 PROCEDURE — 4040F PNEUMOC VAC/ADMIN/RCVD: CPT | Performed by: FAMILY MEDICINE

## 2018-11-07 PROCEDURE — 99214 OFFICE O/P EST MOD 30 MIN: CPT | Performed by: FAMILY MEDICINE

## 2018-11-07 PROCEDURE — 1123F ACP DISCUSS/DSCN MKR DOCD: CPT | Performed by: FAMILY MEDICINE

## 2018-11-07 RX ORDER — FLUTICASONE PROPIONATE 0.05 MG/G
OINTMENT TOPICAL 2 TIMES DAILY
Qty: 60 G | Refills: 3 | Status: SHIPPED | OUTPATIENT
Start: 2018-11-07 | End: 2019-08-16 | Stop reason: ALTCHOICE

## 2018-11-07 RX ORDER — LANOLIN ALCOHOL/MO/W.PET/CERES
6 CREAM (GRAM) TOPICAL NIGHTLY
Qty: 60 MG | Refills: 0
Start: 2018-11-07 | End: 2020-06-15

## 2018-11-07 RX ORDER — HYDROXYZINE PAMOATE 25 MG/1
25 CAPSULE ORAL 3 TIMES DAILY PRN
Qty: 30 CAPSULE | Refills: 1 | Status: SHIPPED | OUTPATIENT
Start: 2018-11-07 | End: 2018-11-21

## 2018-11-07 NOTE — PROGRESS NOTES
Denies any chest pain, palpitations. I think my legs are pretty good right now. Denies any coughing, wheezing or SOB    Denies any abd. Pain, diarrhea or constipation. Denies dizziness or headaches. States I have some trouble sleeping. Skin rash mostly on right arm and hand and ankles, itching. Have tried several OTC creams and ointments.
Psychiatric: She has a normal mood and affect. Her behavior is normal. Judgment and thought content normal.   Nursing note and vitals reviewed. Assessment/Plan:      Diagnosis Orders   1. Primary insomnia  Would try the escitalopram if desired to help with the sleep and the mind getting ahead of herself-- will let us know. In the interim try the melatonin at 2 of the 3 mg pills   2. Essential hypertension  Well controlled at this time   3. 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 Hand dermatitis  Continue with the high potency topical steroids at this time. Would consider BX, referral to Derm again, could even consider a change in medication to CCB for BP medication to see if this would help with the rash overall. Lab Results   Component Value Date    WBC 6.51 10/03/2018    HGB 9.9 (L) 10/03/2018    HCT 30.3 (L) 10/03/2018     10/03/2018    ALT 25 10/03/2018    AST 22 10/03/2018     (L) 10/03/2018    K 4.2 10/03/2018     10/03/2018    CREATININE 0.9 10/03/2018    BUN 25 (H) 10/03/2018    CO2 25 10/03/2018    TSH 2.64 05/29/2018    INR 1.0 04/14/2018       Return in about 6 months (around 5/7/2019). Patient given educational materials - see patientinstructions. Discussed use, benefit, and side effects of prescribed medications. All patient questions answered. Pt voiced understanding. Reviewed health maintenance. Instructed to continue current medications, diet andexercise. Patient agreed with treatment plan. Follow up as directed.      Electronically signed by Kanika Borrego MD on 11/10/2018

## 2018-11-14 ENCOUNTER — TELEPHONE (OUTPATIENT)
Dept: FAMILY MEDICINE CLINIC | Age: 83
End: 2018-11-14

## 2018-11-14 DIAGNOSIS — R60.0 BILATERAL LEG EDEMA: ICD-10-CM

## 2018-11-14 RX ORDER — TORSEMIDE 10 MG/1
10 TABLET ORAL
Qty: 30 TABLET | Refills: 5 | Status: SHIPPED | OUTPATIENT
Start: 2018-11-14 | End: 2019-03-14 | Stop reason: DRUGHIGH

## 2018-11-26 DIAGNOSIS — I10 ESSENTIAL HYPERTENSION: Primary | ICD-10-CM

## 2018-12-10 ENCOUNTER — OFFICE VISIT (OUTPATIENT)
Dept: FAMILY MEDICINE CLINIC | Age: 83
End: 2018-12-10
Payer: MEDICARE

## 2018-12-10 VITALS — SYSTOLIC BLOOD PRESSURE: 152 MMHG | DIASTOLIC BLOOD PRESSURE: 92 MMHG

## 2018-12-10 DIAGNOSIS — I10 ESSENTIAL HYPERTENSION: ICD-10-CM

## 2018-12-10 DIAGNOSIS — R04.0 EPISTAXIS: Primary | ICD-10-CM

## 2018-12-10 PROCEDURE — 1123F ACP DISCUSS/DSCN MKR DOCD: CPT | Performed by: FAMILY MEDICINE

## 2018-12-10 PROCEDURE — G8417 CALC BMI ABV UP PARAM F/U: HCPCS | Performed by: FAMILY MEDICINE

## 2018-12-10 PROCEDURE — G8427 DOCREV CUR MEDS BY ELIG CLIN: HCPCS | Performed by: FAMILY MEDICINE

## 2018-12-10 PROCEDURE — 99213 OFFICE O/P EST LOW 20 MIN: CPT | Performed by: FAMILY MEDICINE

## 2018-12-10 PROCEDURE — 1101F PT FALLS ASSESS-DOCD LE1/YR: CPT | Performed by: FAMILY MEDICINE

## 2018-12-10 PROCEDURE — 1036F TOBACCO NON-USER: CPT | Performed by: FAMILY MEDICINE

## 2018-12-10 PROCEDURE — G8482 FLU IMMUNIZE ORDER/ADMIN: HCPCS | Performed by: FAMILY MEDICINE

## 2018-12-10 PROCEDURE — 4040F PNEUMOC VAC/ADMIN/RCVD: CPT | Performed by: FAMILY MEDICINE

## 2018-12-10 PROCEDURE — 1090F PRES/ABSN URINE INCON ASSESS: CPT | Performed by: FAMILY MEDICINE

## 2018-12-10 NOTE — PROGRESS NOTES
[id number]  Dept: [phone]  Dept [facility]:[phone]    Hilda Li is a 80 y.o. female who presents today for her medical conditions/complaints as notedbelow. Hilda Li is c/o of Epistaxis (has had a bloody nose for over 30 minutes. left side. had one between a week and two weeks ago. nothing before, didnt last this long. )      HPI:     HPI  This is only the second nosebleed she has had-- that lasted about 20 minutes and was a few weeks ago. This one started about 30 minutes ago and has been dripping  Since then. Has been taking the melatonin-- no benadryl, no tylenol PM or other allergy medications.       BP Readings from Last 3 Encounters:   12/10/18 (!) 152/92   11/07/18 120/68   09/12/18 126/66          (goal 120/80)    Wt Readings from Last 3 Encounters:   11/07/18 146 lb 4 oz (66.3 kg)   09/12/18 143 lb 5 oz (65 kg)   07/11/18 141 lb 8 oz (64.2 kg)        Past Medical History:   Diagnosis Date    Arthritis     neck    BRCA2 genetic carrier     no specifics given     Cancer ([facility] [address] 1982, 2006    bilat breasts    GERD (gastroesophageal reflux disease)     Hypertension     Wears glasses       Past Surgical History:   Procedure Laterality Date    ABDOMEN SURGERY  02/07/2018     EXPLORATORY LAPAROTOMY, TOTAL ABDOMINAL HYSTERECTOMY, BSO, APPENDECTOMY, OMENTUMECTOMY, RADICAL TUMOR DEBULKING USING     CATARACT REMOVAL Bilateral     COLONOSCOPY      MASTECTOMY Left 1982    MASTECTOMY Right 2006    [place] OFFICE/OUTPT VISIT,PROCEDURE ONLY N/A 2/7/2018    EXPLORATORY LAPAROTOMY, TOTAL ABDOMINAL HYSTERECTOMY, BSO, APPENDECTOMY, OMENTUMECTOMY, RADICAL TUMOR DEBULKING USING SONOPET. performed by Shann Councilman, MD at [facility]  02/07/2018    UPPER GASTROINTESTINAL ENDOSCOPY  07/2016       Family History   Problem Relation Age of Onset    Breast Cancer Mother         breast cancer    Other Mother Creatinine monitoring  10/03/2019    DTaP/Tdap/Td vaccine (2 - Td) 01/08/2028    Flu vaccine  Completed    Pneumococcal low/med risk  Completed       Subjective:      Review of Systems    Objective:     BP (!) 152/92   LMP  (LMP Unknown)     Physical Exam   Constitutional: She is oriented to person, place, and time. She appears well-developed and well-nourished. HENT:   Nose: No mucosal edema or rhinorrhea. Epistaxis (dried blood on the left septum- distally, no active bleeding noted) is observed. Mouth/Throat: Oropharynx is clear and moist.   Cardiovascular: Normal rate. Pulmonary/Chest: Effort normal.   Neurological: She is alert and oriented to person, place, and time. Nursing note and vitals reviewed. Assessment/Plan:      Diagnosis Orders   1. Epistaxis     2. Essential hypertension         Patient Instructions   Use the Afrin nasal spray only 2 days twice daily. Use the Ayr gel after the afrin (15-20 minutes) twice daily at least and then all winter long. Stop the aspirin for 1 week. Lab Results   Component Value Date    WBC 6.51 10/03/2018    HGB 9.9 (L) 10/03/2018    HCT 30.3 (L) 10/03/2018     10/03/2018    ALT 25 10/03/2018    AST 22 10/03/2018     (L) 10/03/2018    K 4.2 10/03/2018     10/03/2018    CREATININE 0.9 10/03/2018    BUN 25 (H) 10/03/2018    CO2 25 10/03/2018    TSH 2.64 05/29/2018    INR 1.0 04/14/2018       No Follow-up on file. Patient given educational materials - see patientinstructions. Discussed use, benefit, and side effects of prescribed medications. All patient questions answered. Pt voiced understanding. Reviewed health maintenance. Instructed to continue current medications, diet andexercise. Patient agreed with treatment plan. Follow up as directed.      Electronically signed by Meliton Puri MD on 12/16/2018

## 2019-01-07 DIAGNOSIS — K59.04 CHRONIC IDIOPATHIC CONSTIPATION: ICD-10-CM

## 2019-01-07 RX ORDER — POLYETHYLENE GLYCOL 3350 17 G/17G
17 POWDER, FOR SOLUTION ORAL DAILY
Qty: 1 BOTTLE | Refills: 5 | Status: SHIPPED | OUTPATIENT
Start: 2019-01-07 | End: 2022-01-26 | Stop reason: SDUPTHER

## 2019-03-14 ENCOUNTER — OFFICE VISIT (OUTPATIENT)
Dept: FAMILY MEDICINE CLINIC | Age: 84
End: 2019-03-14
Payer: MEDICARE

## 2019-03-14 VITALS
HEART RATE: 52 BPM | SYSTOLIC BLOOD PRESSURE: 134 MMHG | OXYGEN SATURATION: 98 % | WEIGHT: 158 LBS | DIASTOLIC BLOOD PRESSURE: 62 MMHG | BODY MASS INDEX: 28 KG/M2

## 2019-03-14 DIAGNOSIS — R60.0 BILATERAL LEG EDEMA: ICD-10-CM

## 2019-03-14 DIAGNOSIS — I10 ESSENTIAL HYPERTENSION: Primary | ICD-10-CM

## 2019-03-14 DIAGNOSIS — Z23 NEED FOR IMMUNIZATION AGAINST INFLUENZA: ICD-10-CM

## 2019-03-14 PROCEDURE — G0008 ADMIN INFLUENZA VIRUS VAC: HCPCS | Performed by: FAMILY MEDICINE

## 2019-03-14 PROCEDURE — 90662 IIV NO PRSV INCREASED AG IM: CPT | Performed by: FAMILY MEDICINE

## 2019-03-14 PROCEDURE — 1101F PT FALLS ASSESS-DOCD LE1/YR: CPT | Performed by: FAMILY MEDICINE

## 2019-03-14 PROCEDURE — G8417 CALC BMI ABV UP PARAM F/U: HCPCS | Performed by: FAMILY MEDICINE

## 2019-03-14 PROCEDURE — 99214 OFFICE O/P EST MOD 30 MIN: CPT | Performed by: FAMILY MEDICINE

## 2019-03-14 PROCEDURE — 1036F TOBACCO NON-USER: CPT | Performed by: FAMILY MEDICINE

## 2019-03-14 PROCEDURE — G8482 FLU IMMUNIZE ORDER/ADMIN: HCPCS | Performed by: FAMILY MEDICINE

## 2019-03-14 PROCEDURE — G8427 DOCREV CUR MEDS BY ELIG CLIN: HCPCS | Performed by: FAMILY MEDICINE

## 2019-03-14 PROCEDURE — 1090F PRES/ABSN URINE INCON ASSESS: CPT | Performed by: FAMILY MEDICINE

## 2019-03-14 PROCEDURE — 1123F ACP DISCUSS/DSCN MKR DOCD: CPT | Performed by: FAMILY MEDICINE

## 2019-03-14 PROCEDURE — 4040F PNEUMOC VAC/ADMIN/RCVD: CPT | Performed by: FAMILY MEDICINE

## 2019-03-14 RX ORDER — TORSEMIDE 10 MG/1
10 TABLET ORAL
Qty: 30 TABLET | Refills: 5 | Status: SHIPPED
Start: 2019-03-14 | End: 2020-01-09

## 2019-03-14 ASSESSMENT — ENCOUNTER SYMPTOMS
SHORTNESS OF BREATH: 0
CONSTIPATION: 0
DIARRHEA: 0
ABDOMINAL PAIN: 0

## 2019-03-17 ASSESSMENT — ENCOUNTER SYMPTOMS
SHORTNESS OF BREATH: 0
BLURRED VISION: 0
ORTHOPNEA: 0

## 2019-08-16 ENCOUNTER — OFFICE VISIT (OUTPATIENT)
Dept: FAMILY MEDICINE CLINIC | Age: 84
End: 2019-08-16
Payer: MEDICARE

## 2019-08-16 VITALS
HEART RATE: 54 BPM | SYSTOLIC BLOOD PRESSURE: 124 MMHG | OXYGEN SATURATION: 97 % | DIASTOLIC BLOOD PRESSURE: 72 MMHG | BODY MASS INDEX: 26.91 KG/M2 | WEIGHT: 151.9 LBS

## 2019-08-16 DIAGNOSIS — F51.01 PRIMARY INSOMNIA: ICD-10-CM

## 2019-08-16 DIAGNOSIS — I10 ESSENTIAL HYPERTENSION: ICD-10-CM

## 2019-08-16 DIAGNOSIS — H91.13 PRESBYCUSIS OF BOTH EARS: ICD-10-CM

## 2019-08-16 DIAGNOSIS — Z23 NEED FOR SHINGLES VACCINE: ICD-10-CM

## 2019-08-16 DIAGNOSIS — Z00.00 ROUTINE GENERAL MEDICAL EXAMINATION AT A HEALTH CARE FACILITY: Primary | ICD-10-CM

## 2019-08-16 DIAGNOSIS — R60.0 BILATERAL LEG EDEMA: ICD-10-CM

## 2019-08-16 DIAGNOSIS — K59.04 CHRONIC IDIOPATHIC CONSTIPATION: ICD-10-CM

## 2019-08-16 PROCEDURE — G0439 PPPS, SUBSEQ VISIT: HCPCS | Performed by: FAMILY MEDICINE

## 2019-08-16 PROCEDURE — 4040F PNEUMOC VAC/ADMIN/RCVD: CPT | Performed by: FAMILY MEDICINE

## 2019-08-16 PROCEDURE — G8427 DOCREV CUR MEDS BY ELIG CLIN: HCPCS | Performed by: FAMILY MEDICINE

## 2019-08-16 PROCEDURE — G8417 CALC BMI ABV UP PARAM F/U: HCPCS | Performed by: FAMILY MEDICINE

## 2019-08-16 PROCEDURE — 99213 OFFICE O/P EST LOW 20 MIN: CPT | Performed by: FAMILY MEDICINE

## 2019-08-16 PROCEDURE — 1090F PRES/ABSN URINE INCON ASSESS: CPT | Performed by: FAMILY MEDICINE

## 2019-08-16 PROCEDURE — 1123F ACP DISCUSS/DSCN MKR DOCD: CPT | Performed by: FAMILY MEDICINE

## 2019-08-16 PROCEDURE — 1036F TOBACCO NON-USER: CPT | Performed by: FAMILY MEDICINE

## 2019-08-16 ASSESSMENT — LIFESTYLE VARIABLES
HOW OFTEN DO YOU HAVE SIX OR MORE DRINKS ON ONE OCCASION: 0
HOW MANY STANDARD DRINKS CONTAINING ALCOHOL DO YOU HAVE ON A TYPICAL DAY: 0
HOW OFTEN DO YOU HAVE A DRINK CONTAINING ALCOHOL: 4
AUDIT-C TOTAL SCORE: 4

## 2019-08-16 ASSESSMENT — PATIENT HEALTH QUESTIONNAIRE - PHQ9
SUM OF ALL RESPONSES TO PHQ QUESTIONS 1-9: 0
SUM OF ALL RESPONSES TO PHQ QUESTIONS 1-9: 0

## 2019-08-16 NOTE — PROGRESS NOTES
Medicare Annual Wellness Visit  Name: Fiordaliza Sanders Date: 2019   MRN: [id number] Sex: Female   Age: 80 y.o. Ethnicity: Non-/Non    : 1932 Race: Dion Riser is here for [employer] for behavioral, psychosocial and functional/safety risks, and cognitive dysfunction are all negative except as indicated below. These results, as well as other patient data from the [address] form, are documented in Flowsheets linked to this Encounter. Allergies   Allergen Reactions    Amoxicillin      Hives       Prior to Visit Medications    Medication Sig Taking? Authorizing Provider   ciclopirox (LOPROX) 0.77 % cream  Yes Historical Provider, MD   torsemide (DEMADEX) 10 MG tablet Take 1 tablet by mouth Twice a Week Yes Tata Clark MD   Handicap Placard MISC by Does not apply route The disability is expected to last 5 years  Dx: cancer, pain Yes Tata Clark MD   polyethylene glycol [facility]) powder Take 17 g by mouth daily Yes Tata Clark MD   metoprolol tartrate (LOPRESSOR) 25 MG tablet TAKE 1 TABLET TWICE DAILY Yes Tata Clark MD   fluticasone (CUTIVATE) 0.005 % ointment Apply topically 2 times daily Apply topically 2 times daily.  Yes Tata Clark MD   melatonin 3 MG TABS tablet Take 2 tablets by mouth nightly Yes Tata Clark MD   aspirin (ASPIRIN ADULT LOW DOSE) 81 MG EC tablet Take 81 mg by mouth nightly Yes Historical Provider, MD   vitamin D (CHOLECALCIFEROL) 1000 UNIT TABS tablet Take 1,000 Units by mouth daily Yes Historical Provider, MD   diphenhydrAMINE (BENADRYL) 25 MG capsule Take 37.5 mg by mouth nightly   Historical Provider, MD     Past Medical History:   Diagnosis Date    Arthritis     neck    BRCA2 genetic carrier     no specifics given     Cancer ([facility] [address] ,     bilat breasts    GERD (gastroesophageal reflux disease)     Hypertension     Wears glasses      Past Surgical History:   Procedure 01/08/2019    Flu vaccine (1) 09/01/2019    Potassium monitoring  08/07/2020    Creatinine monitoring  08/07/2020    DTaP/Tdap/Td vaccine (2 - Td) 01/08/2028    Pneumococcal 65+ years Vaccine  Completed     Recommendations for Preventive Services Due: see orders and patient instructions/AVS.  . Recommended screening schedule for the next 5-10 years is provided to the patient in written form: see Patient Instructions/AVS.    Sadi ESPARZA LPN, [birth year], performed the documented evaluation under the direct supervision of the attending physician.

## 2019-08-16 NOTE — PATIENT INSTRUCTIONS
Personalized Preventive Plan for Celena Anderson - 8/16/2019  Medicare offers a range of preventive health benefits. Some of the tests and screenings are paid in full while other may be subject to a deductible, co-insurance, and/or copay. Some of these benefits include a comprehensive review of your medical history including lifestyle, illnesses that may run in your family, and various assessments and screenings as appropriate. After reviewing your medical record and screening and assessments performed today your provider may have ordered immunizations, labs, imaging, and/or referrals for you. A list of these orders (if applicable) as well as your Preventive Care list are included within your After Visit Summary for your review. Other Preventive Recommendations:    · A preventive eye exam performed by an eye specialist is recommended every 1-2 years to screen for glaucoma; cataracts, macular degeneration, and other eye disorders. · A preventive dental visit is recommended every 6 months. · Try to get at least 150 minutes of exercise per week or 10,000 steps per day on a pedometer . · Order or download the FREE \"Exercise & Physical Activity: Your Everyday Guide\" from [employer]. Call 1-830.876.7609 or search [employer] online. · You need 4363-2427 mg of calcium and 0719-1577 IU of vitamin D per day. It is possible to meet your calcium requirement with diet alone, but a vitamin D supplement is usually necessary to meet this goal.  · When exposed to the sun, use a sunscreen that protects against both UVA and UVB radiation with an SPF of 30 or greater. Reapply every 2 to 3 hours or after sweating, drying off with a towel, or swimming. · Always wear a seat belt when traveling in a car. Always wear a helmet when riding a bicycle or motorcycle. Personalized Preventive Plan for Celena Anderson - 8/16/2019  Medicare offers a range of preventive health benefits.  Some of the tests and screenings are paid in full while other may be subject to a deductible, co-insurance, and/or copay. Some of these benefits include a comprehensive review of your medical history including lifestyle, illnesses that may run in your family, and various assessments and screenings as appropriate. After reviewing your medical record and screening and assessments performed today your provider may have ordered immunizations, labs, imaging, and/or referrals for you. A list of these orders (if applicable) as well as your Preventive Care list are included within your After Visit Summary for your review. Other Preventive Recommendations:    A preventive eye exam performed by an eye specialist is recommended every 1-2 years to screen for glaucoma; cataracts, macular degeneration, and other eye disorders. A preventive dental visit is recommended every 6 months. Try to get at least 150 minutes of exercise per week or 10,000 steps per day on a pedometer . Order or download the FREE \"Exercise & Physical Activity: Your Everyday Guide\" from [employer]. Call 9-150.201.4089 or search [employer] online. You need 5321-6657 mg of calcium and 0569-6945 IU of vitamin D per day. It is possible to meet your calcium requirement with diet alone, but a vitamin D supplement is usually necessary to meet this goal.  When exposed to the sun, use a sunscreen that protects against both UVA and UVB radiation with an SPF of 30 or greater. Reapply every 2 to 3 hours or after sweating, drying off with a towel, or swimming. Always wear a seat belt when traveling in a car. Always wear a helmet when riding a bicycle or motorcycle.

## 2019-08-16 NOTE — PROGRESS NOTES
[address] [address] [address]  Dept: [phone]  Dept [facility]:[phone]    Linda Martinez is a 80 y.o. female who presents today for her medical conditions/complaints as notedbelow. Linda Martinez is c/o of Medicare AWV      HPI:     HPI  Has a lot on her mind at once. Does not personally think she has been having any issues with the memory issues. Her  has had a lot -- comprehension is not as good. Does not get good rest as much blaire with the current stressors. Is taking the melatonin. Has not been taking the benadryl. Does get up at least 1-2 times per night to go to the bathroom. Get the meals on Wheels a few times per week.  has had increasing illness and this helps out when she does not have to do a lot of cooking. Blood pressure has been well controlled. Has not had any significant swelling no palpitations no shortness of breath no chest pain. Continues her follow-up with oncology. Has been doing well no signs of recurrence of her cancer at this time.     BP Readings from Last 3 Encounters:   08/16/19 124/72   03/14/19 134/62   12/10/18 (!) 152/92          (goal 120/80)    Wt Readings from Last 3 Encounters:   08/16/19 151 lb 14.4 oz (68.9 kg)   03/14/19 158 lb (71.7 kg)   11/07/18 146 lb 4 oz (66.3 kg)        Past Medical History:   Diagnosis Date    Arthritis     neck    BRCA2 genetic carrier     no specifics given     Cancer ([facility] [address] 1982, 2006    bilat breasts    GERD (gastroesophageal reflux disease)     Hypertension     Wears glasses       Past Surgical History:   Procedure Laterality Date    ABDOMEN SURGERY  02/07/2018     EXPLORATORY LAPAROTOMY, TOTAL ABDOMINAL HYSTERECTOMY, BSO, APPENDECTOMY, OMENTUMECTOMY, RADICAL TUMOR DEBULKING USING     CATARACT REMOVAL Bilateral     COLONOSCOPY      MASTECTOMY Left 1982    MASTECTOMY Right 2006    NE OFFICE/OUTPT VISIT,PROCEDURE ONLY N/A 2/7/2018 OUTPATIENT VISIT 15 MINUTES [63104]   7. Need for shingles vaccine  zoster recombinant adjuvanted vaccine [facility]) 50 MCG/0.5ML SUSR injection     Referral to audiology for the hearing evaluation. Family is more concerned with the hearing that she has but will at least check into her options. .    To the torsemide 3 times a week at this time but then would decrease to twice a week if symptoms are controlled and she is feeling a little bit dry. Continue the MiraLAX daily as her symptoms are well controlled on this at this time. Winifred Opitz feels that her insomnia is no worsening had been in fact may be a little bit better so will not treat at this time. Would consider an SSRI if needed in the future. Lab Results   Component Value Date    WBC 9.33 08/07/2019    HGB 11.9 (L) 08/07/2019    HCT 36.3 (L) 08/07/2019     08/07/2019    ALT 14 08/07/2019    AST 25 08/07/2019     08/07/2019    K 4.3 08/07/2019     08/07/2019    CREATININE 1.0 08/07/2019    BUN 18 (H) 08/07/2019    CO2 28 08/07/2019    TSH 2.64 05/29/2018    INR 1.0 04/14/2018       Return for Medicare Annual Wellness Visit in 1 year. Patient given educational materials - see patientinstructions. Discussed use, benefit, and side effects of prescribed medications. All patient questions answered. Pt voiced understanding. Reviewed health maintenance. Instructed to continue current medications, diet andexercise. Patient agreed with treatment plan. Follow up as directed.      Electronically signed by Asya Delgado MD on 8/18/2019

## 2020-01-06 ENCOUNTER — OFFICE VISIT (OUTPATIENT)
Dept: FAMILY MEDICINE CLINIC | Age: 85
End: 2020-01-06
Payer: MEDICARE

## 2020-01-06 VITALS
OXYGEN SATURATION: 98 % | WEIGHT: 151 LBS | DIASTOLIC BLOOD PRESSURE: 62 MMHG | SYSTOLIC BLOOD PRESSURE: 134 MMHG | HEIGHT: 63 IN | HEART RATE: 56 BPM | BODY MASS INDEX: 26.75 KG/M2

## 2020-01-06 PROCEDURE — 4040F PNEUMOC VAC/ADMIN/RCVD: CPT | Performed by: FAMILY MEDICINE

## 2020-01-06 PROCEDURE — 1123F ACP DISCUSS/DSCN MKR DOCD: CPT | Performed by: FAMILY MEDICINE

## 2020-01-06 PROCEDURE — G8482 FLU IMMUNIZE ORDER/ADMIN: HCPCS | Performed by: FAMILY MEDICINE

## 2020-01-06 PROCEDURE — 1090F PRES/ABSN URINE INCON ASSESS: CPT | Performed by: FAMILY MEDICINE

## 2020-01-06 PROCEDURE — G8427 DOCREV CUR MEDS BY ELIG CLIN: HCPCS | Performed by: FAMILY MEDICINE

## 2020-01-06 PROCEDURE — 99212 OFFICE O/P EST SF 10 MIN: CPT | Performed by: FAMILY MEDICINE

## 2020-01-06 PROCEDURE — G8417 CALC BMI ABV UP PARAM F/U: HCPCS | Performed by: FAMILY MEDICINE

## 2020-01-06 PROCEDURE — 99213 OFFICE O/P EST LOW 20 MIN: CPT | Performed by: FAMILY MEDICINE

## 2020-01-06 PROCEDURE — 1036F TOBACCO NON-USER: CPT | Performed by: FAMILY MEDICINE

## 2020-01-06 RX ORDER — DOXYCYCLINE HYCLATE 100 MG
100 TABLET ORAL 2 TIMES DAILY
COMMUNITY
Start: 2020-01-04 | End: 2020-02-17

## 2020-01-06 ASSESSMENT — PATIENT HEALTH QUESTIONNAIRE - PHQ9
1. LITTLE INTEREST OR PLEASURE IN DOING THINGS: 0
2. FEELING DOWN, DEPRESSED OR HOPELESS: 1
SUM OF ALL RESPONSES TO PHQ9 QUESTIONS 1 & 2: 1
SUM OF ALL RESPONSES TO PHQ QUESTIONS 1-9: 1
SUM OF ALL RESPONSES TO PHQ QUESTIONS 1-9: 1

## 2020-01-06 NOTE — PROGRESS NOTES
[address] [address] [facility], [address] [place] [address]  Dept: [phone]  Dept QPP:[phone]    Suni Dawkins is a 80 y.o. female who presents today for her medical conditions/complaints as notedbelow. Suni Dawkins is c/o of ED Follow-up (was in [facility] ER for bloody nose 1/4/2020, needs nasal packing removed) and Hypertension (BP elevated since the ER visit this weekend)      HPI:     HPI    Has had the touble with the nose bleeds in the past.  Had always been able to get them stopped in the past.  Midline of her emergency room presentation she had had a few small drips of a bloody nose earlier in the day. Then she did have a bloody nose that lasted maybe about 15 minutes but she was able to get it stopped. She had a second longer bloody nose and her daughter came over and and was able to help her get it stopped with the help of a clip that she had there at home from a previous bloody nose. Unfortunately then she had another bloody nose and she was really unable to do anything to get it stopped. When she went into the emergency room they were able to pack it and get the bleeding to stop at that time. She did have some swallowing of the blood when the bleeding was most intense. And then yesterday she did have some blackish almost bloody looking stool but has not had any blood in her bowel movement since that time. This morning the bowel movement was fairly normal color slightly yellow. Diarrhea no abdominal pain nausea or vomiting. . She is not having any URI type symptoms at this time. No dizziness no lightheadedness no palpitations noted. Blood pressure was high in the emergency room but is normal today and had been normal prior to this.   Her hemoglobin and platelet count were both normal in the emergency room    BP Readings from Last 3 Encounters:   01/06/20 134/62   08/16/19 124/72   03/14/19 134/62          (goal 120/80)    Wt Readings from Last 3 Encounters:   01/06/20 151 lb (68.5 kg)   08/16/19 151 lb 14.4 oz (68.9 kg)   03/14/19 158 lb (71.7 kg)        Past Medical History:   Diagnosis Date    Arthritis     neck    BRCA2 genetic carrier     no specifics given     Cancer ([facility] [address] 1982, 2006    bilat breasts    GERD (gastroesophageal reflux disease)     Hypertension     Wears glasses       Past Surgical History:   Procedure Laterality Date    ABDOMEN SURGERY  02/07/2018     EXPLORATORY LAPAROTOMY, TOTAL ABDOMINAL HYSTERECTOMY, BSO, APPENDECTOMY, OMENTUMECTOMY, RADICAL TUMOR DEBULKING USING     CATARACT REMOVAL Bilateral     COLONOSCOPY      MASTECTOMY Left 1982    MASTECTOMY Right 2006    NE OFFICE/OUTPT VISIT,PROCEDURE ONLY N/A 2/7/2018    EXPLORATORY LAPAROTOMY, TOTAL ABDOMINAL HYSTERECTOMY, BSO, APPENDECTOMY, OMENTUMECTOMY, RADICAL TUMOR DEBULKING USING SONOPET. performed by Kevin Cordero MD at [address] [place] [address]  02/07/2018    UPPER GASTROINTESTINAL ENDOSCOPY  07/2016       Family History   Problem Relation Age of Onset    Breast Cancer Mother         breast cancer    Other Mother         gallstones, water retention    Other Father         parkinsons    Breast Cancer Other         states sibling    Hypertension Other         states sibling     Osteoarthritis Other         states sibling had Severe     Stroke Sister     Endometrial Cancer Sister     Breast Cancer Sister     BRCA 1/2 Sister     Hypertension Sister     Breast Cancer Daughter        Social History     Tobacco Use    Smoking status: Never Smoker    Smokeless tobacco: Never Used   Substance Use Topics    Alcohol use:  Yes     Alcohol/week: 1.0 standard drinks     Types: 1 Glasses of wine per week      Current Outpatient Medications   Medication Sig Dispense Refill    [START ON 1/10/2020] torsemide (DEMADEX) 10 MG tablet Take 1 tablet by mouth three times a week 30 tablet 5    doxycycline hyclate (VIBRA-TABS) 100 MG tablet Take 100 mg by mouth 2 times daily      metoprolol tartrate (LOPRESSOR) 25 MG tablet TAKE 1 TABLET TWICE DAILY 180 tablet 3    ciclopirox (LOPROX) 0.77 % cream   0    polyethylene glycol (GLYCOLAX) powder Take 17 g by mouth daily 1 Bottle 5    melatonin 3 MG TABS tablet Take 2 tablets by mouth nightly 60 mg 0    aspirin (ASPIRIN ADULT LOW DOSE) 81 MG EC tablet Take 81 mg by mouth nightly      vitamin D (CHOLECALCIFEROL) 1000 UNIT TABS tablet Take 1,000 Units by mouth daily      zoster recombinant adjuvanted vaccine (SHINGRIX) 50 MCG/0.5ML SUSR injection Inject 0.5 mLs into the muscle See Admin Instructions 1 dose now and repeat in 2-6 months 0.5 mL 0    Handicap Placard MISC by Does not apply route The disability is expected to last 5 years  Dx: cancer, pain 1 each 1     No current facility-administered medications for this visit. Allergies   Allergen Reactions    Amoxicillin      Hives         Health Maintenance   Topic Date Due    Shingles Vaccine (2 of 3) 09/29/2014    Potassium monitoring  08/07/2020    Creatinine monitoring  08/07/2020    Annual Wellness Visit (AWV)  08/15/2020    DTaP/Tdap/Td vaccine (2 - Td) 01/08/2028    Flu vaccine  Completed    Pneumococcal 65+ years Vaccine  Completed       Subjective:      Review of Systems    Objective:     /62 (Site: Left Upper Arm, Position: Sitting, Cuff Size: Large Adult)   Pulse 56   Ht 5' 2.99\" (1.6 m)   Wt 151 lb (68.5 kg)   LMP  (LMP Unknown)   SpO2 98%   BMI 26.76 kg/m²     Physical Exam  Vitals signs and nursing note reviewed. Constitutional:       Appearance: Normal appearance. HENT:      Head: Normocephalic and atraumatic. Right Ear: External ear normal.      Left Ear: External ear normal.      Nose: Rhinorrhea present. No congestion. Comments: Left naris has Rhino Rocket in place. Cuff deflated and the rocket withdrawn. There is some mild amount of dried blood on this but minimal.  No bleeding noted once this is moved.   Neck: Musculoskeletal: Normal range of motion and neck supple. No muscular tenderness. Cardiovascular:      Rate and Rhythm: Normal rate. Pulses: Normal pulses. Musculoskeletal:      Right lower leg: Edema present. Left lower leg: Edema present. Comments: Trace bilateral ankle edema   Lymphadenopathy:      Cervical: No cervical adenopathy. Skin:     Capillary Refill: Capillary refill takes less than 2 seconds. Neurological:      General: No focal deficit present. Mental Status: She is alert and oriented to person, place, and time. Assessment/Plan:      Diagnosis Orders   1. Left-sided epistaxis     2. Bilateral leg edema  torsemide (DEMADEX) 10 MG tablet   3. Essential hypertension       Use the AYR regularly through the day to prevent the dryness. Use the Afrin if needed to help slow down a nose bleed. No more than 3 time days in a row. Continue to use the clamp if needed for 15 minutes intervals to stop bleeding if it occurs    Continue with the water pill 3 days weekly. Continue to elevate the legs when she is seated. Continue to monitor the blood pressure suspect is elevated because of stress when she is in the emergency room. As long as it remains at the current levels will not further adjust blood pressure medications. Lab Results   Component Value Date    WBC 9.33 08/07/2019    HGB 11.9 (L) 08/07/2019    HCT 36.3 (L) 08/07/2019     08/07/2019    ALT 14 08/07/2019    AST 25 08/07/2019     08/07/2019    K 4.3 08/07/2019     08/07/2019    CREATININE 1.0 08/07/2019    BUN 18 (H) 08/07/2019    CO2 28 08/07/2019    TSH 2.64 05/29/2018    INR 1.0 04/14/2018       Return if symptoms worsen or fail to improve, for As scheduled. Patient given educational materials - see patientinstructions. Discussed use, benefit, and side effects of prescribed medications. All patient questions answered. Pt voiced understanding. Reviewed health maintenance. Instructed to continue current medications, diet andexercise. Patient agreed with treatment plan. Follow up as directed.      Electronically signed by Manjeet Brock MD on 1/9/2020

## 2020-01-06 NOTE — PATIENT INSTRUCTIONS
Use the AYR regularly through the day to prevent the dryness. Use the Afrin if needed to help slow down a nose bleed.

## 2020-01-08 ENCOUNTER — TELEPHONE (OUTPATIENT)
Dept: FAMILY MEDICINE CLINIC | Age: 85
End: 2020-01-08

## 2020-01-08 NOTE — TELEPHONE ENCOUNTER
Patient was in the office on Monday for nasal packing removal. She has been using afrin nasal spray as advised. Today her nose if dripping blood. Not a whole lot- but when she stands up to walk to the bathroom it will drip a few drops of blood. Sebastián Ordoñez is asking if you have any other advise on what they can do.

## 2020-01-08 NOTE — TELEPHONE ENCOUNTER
Use the clip and leave this on in 15 minute intervals. Stay upright during the day.   Use the Afrin no more than 2 times daily no more than 3 days in a row

## 2020-01-09 RX ORDER — TORSEMIDE 10 MG/1
10 TABLET ORAL
Qty: 30 TABLET | Refills: 5
Start: 2020-01-10 | End: 2020-01-29

## 2020-01-22 RX ORDER — TORSEMIDE 10 MG/1
TABLET ORAL
Qty: 30 TABLET | Refills: 5 | OUTPATIENT
Start: 2020-01-22

## 2020-01-28 NOTE — TELEPHONE ENCOUNTER
Per pharmacist -   Patient needs new rx sent to [facility] RX on 1/10/2020 was \"no print\".              Genet Marie is requesting a refill on the following medication(s):  Requested Prescriptions     Pending Prescriptions Disp Refills    torsemide (DEMADEX) 10 MG tablet [Pharmacy Med Name: TORSEMIDE 10 MG Tablet] 30 tablet 5     Sig: TAKE 1 TABLET THREE TIMES WEEKLY AS NEEDED       Last Visit Date (If Applicable):  9/9/8519    Next Visit Date:    2/17/2020

## 2020-01-29 RX ORDER — TORSEMIDE 10 MG/1
TABLET ORAL
Qty: 30 TABLET | Refills: 5 | Status: SHIPPED | OUTPATIENT
Start: 2020-01-29 | End: 2020-06-15 | Stop reason: SDUPTHER

## 2020-02-17 ENCOUNTER — OFFICE VISIT (OUTPATIENT)
Dept: FAMILY MEDICINE CLINIC | Age: 85
End: 2020-02-17
Payer: MEDICARE

## 2020-02-17 VITALS
BODY MASS INDEX: 27.29 KG/M2 | OXYGEN SATURATION: 97 % | SYSTOLIC BLOOD PRESSURE: 110 MMHG | DIASTOLIC BLOOD PRESSURE: 68 MMHG | HEART RATE: 51 BPM | WEIGHT: 154 LBS

## 2020-02-17 PROBLEM — J90 PLEURAL EFFUSION: Status: RESOLVED | Noted: 2018-02-03 | Resolved: 2020-02-17

## 2020-02-17 PROBLEM — C50.919 MALIGNANT NEOPLASM OF OTHER SPECIFIED SITES OF FEMALE BREAST: Status: RESOLVED | Noted: 2017-06-14 | Resolved: 2020-02-17

## 2020-02-17 PROBLEM — C80.1 MALIGNANT NEOPLASM (HCC): Status: RESOLVED | Noted: 2017-06-14 | Resolved: 2020-02-17

## 2020-02-17 PROBLEM — N94.89 ADNEXAL MASS: Status: RESOLVED | Noted: 2018-02-03 | Resolved: 2020-02-17

## 2020-02-17 PROCEDURE — G8427 DOCREV CUR MEDS BY ELIG CLIN: HCPCS | Performed by: FAMILY MEDICINE

## 2020-02-17 PROCEDURE — 99214 OFFICE O/P EST MOD 30 MIN: CPT | Performed by: FAMILY MEDICINE

## 2020-02-17 PROCEDURE — 99211 OFF/OP EST MAY X REQ PHY/QHP: CPT | Performed by: FAMILY MEDICINE

## 2020-02-17 PROCEDURE — 1090F PRES/ABSN URINE INCON ASSESS: CPT | Performed by: FAMILY MEDICINE

## 2020-02-17 PROCEDURE — G8417 CALC BMI ABV UP PARAM F/U: HCPCS | Performed by: FAMILY MEDICINE

## 2020-02-17 PROCEDURE — 1036F TOBACCO NON-USER: CPT | Performed by: FAMILY MEDICINE

## 2020-02-17 PROCEDURE — 4040F PNEUMOC VAC/ADMIN/RCVD: CPT | Performed by: FAMILY MEDICINE

## 2020-02-17 PROCEDURE — G8482 FLU IMMUNIZE ORDER/ADMIN: HCPCS | Performed by: FAMILY MEDICINE

## 2020-02-17 PROCEDURE — 1123F ACP DISCUSS/DSCN MKR DOCD: CPT | Performed by: FAMILY MEDICINE

## 2020-02-17 RX ORDER — ESCITALOPRAM OXALATE 5 MG/1
5 TABLET ORAL DAILY
Qty: 30 TABLET | Refills: 5 | Status: SHIPPED | OUTPATIENT
Start: 2020-02-17 | End: 2020-08-06 | Stop reason: SDUPTHER

## 2020-02-17 NOTE — PROGRESS NOTES
mg by mouth nightly      vitamin D (CHOLECALCIFEROL) 1000 UNIT TABS tablet Take 1,000 Units by mouth daily      Handicap Placard MISC by Does not apply route The disability is expected to last 5 years  Dx: cancer, pain 1 each 1     No current facility-administered medications for this visit. Allergies   Allergen Reactions    Amoxicillin      Hives         Health Maintenance   Topic Date Due    Shingles Vaccine (2 of 3) 09/29/2014    Potassium monitoring  08/07/2020    Creatinine monitoring  08/07/2020    Annual Wellness Visit (AWV)  08/16/2020    DTaP/Tdap/Td vaccine (2 - Td) 01/08/2028    Flu vaccine  Completed    Pneumococcal 65+ years Vaccine  Completed    Hepatitis A vaccine  Aged Out    Hepatitis B vaccine  Aged Out    Hib vaccine  Aged Out    Meningococcal (ACWY) vaccine  Aged Out       Subjective:      Review of Systems   Constitutional: Negative for activity change, appetite change, fatigue and fever. Respiratory: Negative for cough. Gastrointestinal: Negative for constipation and diarrhea. Objective:     /68 (Site: Left Upper Arm, Position: Sitting)   Pulse 51   Wt 154 lb (69.9 kg)   LMP  (LMP Unknown)   SpO2 97%   BMI 27.29 kg/m²     Physical Exam  Vitals signs and nursing note reviewed. Constitutional:       Appearance: She is well-developed. HENT:      Head: Normocephalic and atraumatic. Eyes:      Conjunctiva/sclera: Conjunctivae normal.   Neck:      Musculoskeletal: Normal range of motion and neck supple. Thyroid: No thyromegaly. Vascular: No JVD. Cardiovascular:      Rate and Rhythm: Normal rate and regular rhythm. Heart sounds: Normal heart sounds. No murmur. No friction rub. No gallop. Pulmonary:      Effort: Pulmonary effort is normal. No respiratory distress. Breath sounds: Normal breath sounds. Abdominal:      Palpations: Abdomen is soft. Lymphadenopathy:      Cervical: No cervical adenopathy.    Skin:     General: Skin is warm.   Neurological:      Mental Status: She is alert and oriented to person, place, and time. Psychiatric:         Behavior: Behavior normal.         Thought Content: Thought content normal.         Judgment: Judgment normal.         Assessment/Plan:      Diagnosis Orders   1. Essential hypertension  Well controlled at this time. No changes   2. 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 Continue with follow up with Dr. Mills    3. BRCA2 genetic carrier     4. Primary insomnia  Will start on the escitalopram for her insomnia. Will consider increasing to 10 mg daily if not improved with the 5 mg daily     Recheck in 3 months for the esciatlopram    Lab Results   Component Value Date    WBC 9.33 08/07/2019    HGB 11.9 (L) 08/07/2019    HCT 36.3 (L) 08/07/2019     08/07/2019    ALT 14 08/07/2019    AST 25 08/07/2019     08/07/2019    K 4.3 08/07/2019     08/07/2019    CREATININE 1.0 08/07/2019    BUN 18 (H) 08/07/2019    CO2 28 08/07/2019    TSH 2.64 05/29/2018    INR 1.0 04/14/2018       Return in about 3 months (around 5/17/2020). Patient given educational materials - see patientinstructions. Discussed use, benefit, and side effects of prescribed medications. All patient questions answered. Pt voiced understanding. Reviewed health maintenance. Instructed to continue current medications, diet andexercise. Patient agreed with treatment plan. Follow up as directed.      Electronically signed by Bridgett Spaulding MD on 2/23/2020

## 2020-02-23 PROBLEM — F51.01 PRIMARY INSOMNIA: Status: ACTIVE | Noted: 2020-02-23

## 2020-02-23 ASSESSMENT — ENCOUNTER SYMPTOMS
DIARRHEA: 0
COUGH: 0
CONSTIPATION: 0

## 2020-03-19 ENCOUNTER — TELEPHONE (OUTPATIENT)
Dept: FAMILY MEDICINE CLINIC | Age: 85
End: 2020-03-19

## 2020-03-24 ENCOUNTER — TELEPHONE (OUTPATIENT)
Dept: FAMILY MEDICINE CLINIC | Age: 85
End: 2020-03-24

## 2020-03-25 ENCOUNTER — TELEPHONE (OUTPATIENT)
Dept: FAMILY MEDICINE CLINIC | Age: 85
End: 2020-03-25

## 2020-03-25 NOTE — TELEPHONE ENCOUNTER
Leave the clip on for at least 30 minutes. The aspirin will take up t a week to get out of her platelets (system) and the effects to wear off. Use the clip for at least 30 minutes. Not a lot else she can do at home.

## 2020-06-15 ENCOUNTER — OFFICE VISIT (OUTPATIENT)
Dept: FAMILY MEDICINE CLINIC | Age: 85
End: 2020-06-15
Payer: MEDICARE

## 2020-06-15 VITALS
WEIGHT: 155 LBS | HEART RATE: 51 BPM | SYSTOLIC BLOOD PRESSURE: 134 MMHG | DIASTOLIC BLOOD PRESSURE: 68 MMHG | OXYGEN SATURATION: 98 % | BODY MASS INDEX: 27.46 KG/M2

## 2020-06-15 PROCEDURE — 1123F ACP DISCUSS/DSCN MKR DOCD: CPT | Performed by: FAMILY MEDICINE

## 2020-06-15 PROCEDURE — 1090F PRES/ABSN URINE INCON ASSESS: CPT | Performed by: FAMILY MEDICINE

## 2020-06-15 PROCEDURE — G8427 DOCREV CUR MEDS BY ELIG CLIN: HCPCS | Performed by: FAMILY MEDICINE

## 2020-06-15 PROCEDURE — 4040F PNEUMOC VAC/ADMIN/RCVD: CPT | Performed by: FAMILY MEDICINE

## 2020-06-15 PROCEDURE — 99214 OFFICE O/P EST MOD 30 MIN: CPT | Performed by: FAMILY MEDICINE

## 2020-06-15 PROCEDURE — 99213 OFFICE O/P EST LOW 20 MIN: CPT | Performed by: FAMILY MEDICINE

## 2020-06-15 PROCEDURE — G8417 CALC BMI ABV UP PARAM F/U: HCPCS | Performed by: FAMILY MEDICINE

## 2020-06-15 PROCEDURE — 1036F TOBACCO NON-USER: CPT | Performed by: FAMILY MEDICINE

## 2020-06-15 RX ORDER — TORSEMIDE 10 MG/1
TABLET ORAL
Qty: 30 TABLET | Refills: 5 | Status: SHIPPED | OUTPATIENT
Start: 2020-06-15 | End: 2021-05-25 | Stop reason: SDUPTHER

## 2020-06-15 NOTE — PROGRESS NOTES
[address] [address] [address]  Dept: [phone]  Dept TFZ:[phone]    Michell Hoover is a 80 y.o. female who presents today for her medical conditions/complaints as notedbelow. Michell Hoover is c/o of Hypertension (4 mo follow up)      HPI:     Hypertension   This is a chronic problem. The current episode started more than 1 year ago. The problem is unchanged. The problem is controlled. Pertinent negatives include no anxiety, blurred vision, chest pain, headaches, malaise/fatigue, neck pain, orthopnea, palpitations, peripheral edema, PND, shortness of breath or sweats. There are no associated agents to hypertension. Risk factors for coronary artery disease include post-menopausal state and sedentary lifestyle. Past treatments include beta blockers. Will be going to assisted living sometime soon. Has been living with her daughter recently. Has not had any more nosebleeds since she has been Off of the aspirin. No further nosebleeds. Is sleeping better than what she did. Started on the escitalopram and that seems to help. Falls asleep easily and then seems to go back to sleep more easily if she wakes up. Takes a good nap in the day also.   Family thinks she is feeling better till with this        BP Readings from Last 3 Encounters:   06/15/20 134/68   02/17/20 110/68   01/06/20 134/62          (goal 120/80)    Wt Readings from Last 3 Encounters:   06/15/20 155 lb (70.3 kg)   02/17/20 154 lb (69.9 kg)   01/06/20 151 lb (68.5 kg)        Past Medical History:   Diagnosis Date    Arthritis     neck    BRCA2 genetic carrier     no specifics given     Cancer ([facility] [address] 1982, 2006    bilat breasts    GERD (gastroesophageal reflux disease)     Hypertension     Wears glasses       Past Surgical History:   Procedure Laterality Date    ABDOMEN SURGERY  02/07/2018     EXPLORATORY LAPAROTOMY, TOTAL ABDOMINAL HYSTERECTOMY, BSO, APPENDECTOMY,

## 2020-06-21 ASSESSMENT — ENCOUNTER SYMPTOMS
ORTHOPNEA: 0
BLURRED VISION: 0
SHORTNESS OF BREATH: 0

## 2020-06-27 ENCOUNTER — TELEPHONE (OUTPATIENT)
Dept: FAMILY MEDICINE CLINIC | Age: 85
End: 2020-06-27

## 2020-07-13 RX ORDER — DENOSUMAB 60 MG/ML
60 INJECTION SUBCUTANEOUS ONCE
Qty: 1 ML | Refills: 0 | Status: SHIPPED | OUTPATIENT
Start: 2020-07-13 | End: 2021-05-03

## 2020-07-13 NOTE — TELEPHONE ENCOUNTER
Daughter called back stating patient is willing to try the prolia but wants to know what the side effects are and what she should expect with taking this medication.

## 2020-07-13 NOTE — TELEPHONE ENCOUNTER
Spoke with Adan Strauss - she is going to discuss with patient and will call back and let Dr William Hurtado know what decision they have made.

## 2020-07-14 NOTE — TELEPHONE ENCOUNTER
Very well tolerated. Occasionally some minor muscle aches the day of or after but tylenol is fine. Very rare cases of allergic reaction, bone fractures in other areas also have been reported but very rare and more theoretical risk. May not prevent all fractures but decreases risk. Order put in for the [facility]. Please send form to [facility] to start process for the patient.

## 2020-08-06 RX ORDER — ESCITALOPRAM OXALATE 5 MG/1
5 TABLET ORAL DAILY
Qty: 30 TABLET | Refills: 5 | Status: SHIPPED | OUTPATIENT
Start: 2020-08-06 | End: 2020-08-11 | Stop reason: SDUPTHER

## 2020-08-11 RX ORDER — ESCITALOPRAM OXALATE 5 MG/1
5 TABLET ORAL DAILY
Qty: 30 TABLET | Refills: 5 | Status: SHIPPED | OUTPATIENT
Start: 2020-08-11 | End: 2021-05-03 | Stop reason: SDUPTHER

## 2020-08-11 NOTE — TELEPHONE ENCOUNTER
Jelly Nails is requesting a refill on the following medication(s):  Requested Prescriptions     Pending Prescriptions Disp Refills    escitalopram (LEXAPRO) 5 MG tablet 30 tablet 5     Sig: Take 1 tablet by mouth daily       Last Visit Date (If Applicable):  7/52/1206    Next Visit Date:    12/21/2020      RX needs sent to [facility]

## 2020-10-01 ENCOUNTER — TELEPHONE (OUTPATIENT)
Dept: FAMILY MEDICINE CLINIC | Age: 85
End: 2020-10-01

## 2021-01-18 ENCOUNTER — OFFICE VISIT (OUTPATIENT)
Dept: FAMILY MEDICINE CLINIC | Age: 86
End: 2021-01-18
Payer: MEDICARE

## 2021-01-18 VITALS
BODY MASS INDEX: 29.23 KG/M2 | DIASTOLIC BLOOD PRESSURE: 66 MMHG | HEART RATE: 72 BPM | SYSTOLIC BLOOD PRESSURE: 136 MMHG | OXYGEN SATURATION: 98 % | WEIGHT: 165 LBS | HEIGHT: 63 IN

## 2021-01-18 DIAGNOSIS — C50.911 BILATERAL MALIGNANT NEOPLASM OF BREAST IN FEMALE, UNSPECIFIED ESTROGEN RECEPTOR STATUS, UNSPECIFIED SITE OF BREAST (HCC): ICD-10-CM

## 2021-01-18 DIAGNOSIS — Z15.01 BRCA2 GENETIC CARRIER: ICD-10-CM

## 2021-01-18 DIAGNOSIS — F51.01 PRIMARY INSOMNIA: ICD-10-CM

## 2021-01-18 DIAGNOSIS — C50.912 BILATERAL MALIGNANT NEOPLASM OF BREAST IN FEMALE, UNSPECIFIED ESTROGEN RECEPTOR STATUS, UNSPECIFIED SITE OF BREAST (HCC): ICD-10-CM

## 2021-01-18 DIAGNOSIS — Z00.00 ROUTINE GENERAL MEDICAL EXAMINATION AT A HEALTH CARE FACILITY: Primary | ICD-10-CM

## 2021-01-18 DIAGNOSIS — Z15.02 MALIGNANT NEOPLASM OF RIGHT FALLOPIAN TUBE WITH BRCA2 GENE MUTATION (HCC): Chronic | ICD-10-CM

## 2021-01-18 DIAGNOSIS — Z15.09 BRCA2 GENETIC CARRIER: ICD-10-CM

## 2021-01-18 DIAGNOSIS — I10 ESSENTIAL HYPERTENSION: ICD-10-CM

## 2021-01-18 DIAGNOSIS — Z15.01 MALIGNANT NEOPLASM OF RIGHT FALLOPIAN TUBE WITH BRCA2 GENE MUTATION (HCC): Chronic | ICD-10-CM

## 2021-01-18 DIAGNOSIS — C57.01 MALIGNANT NEOPLASM OF RIGHT FALLOPIAN TUBE WITH BRCA2 GENE MUTATION (HCC): Chronic | ICD-10-CM

## 2021-01-18 DIAGNOSIS — Z15.09 MALIGNANT NEOPLASM OF RIGHT FALLOPIAN TUBE WITH BRCA2 GENE MUTATION (HCC): Chronic | ICD-10-CM

## 2021-01-18 PROCEDURE — G8427 DOCREV CUR MEDS BY ELIG CLIN: HCPCS | Performed by: FAMILY MEDICINE

## 2021-01-18 PROCEDURE — G8482 FLU IMMUNIZE ORDER/ADMIN: HCPCS | Performed by: FAMILY MEDICINE

## 2021-01-18 PROCEDURE — G8417 CALC BMI ABV UP PARAM F/U: HCPCS | Performed by: FAMILY MEDICINE

## 2021-01-18 PROCEDURE — 1036F TOBACCO NON-USER: CPT | Performed by: FAMILY MEDICINE

## 2021-01-18 PROCEDURE — 99214 OFFICE O/P EST MOD 30 MIN: CPT | Performed by: FAMILY MEDICINE

## 2021-01-18 PROCEDURE — G0439 PPPS, SUBSEQ VISIT: HCPCS | Performed by: FAMILY MEDICINE

## 2021-01-18 PROCEDURE — 99397 PER PM REEVAL EST PAT 65+ YR: CPT | Performed by: FAMILY MEDICINE

## 2021-01-18 PROCEDURE — G0463 HOSPITAL OUTPT CLINIC VISIT: HCPCS | Performed by: FAMILY MEDICINE

## 2021-01-18 PROCEDURE — 4040F PNEUMOC VAC/ADMIN/RCVD: CPT | Performed by: FAMILY MEDICINE

## 2021-01-18 PROCEDURE — 1123F ACP DISCUSS/DSCN MKR DOCD: CPT | Performed by: FAMILY MEDICINE

## 2021-01-18 PROCEDURE — 1090F PRES/ABSN URINE INCON ASSESS: CPT | Performed by: FAMILY MEDICINE

## 2021-01-18 ASSESSMENT — PATIENT HEALTH QUESTIONNAIRE - PHQ9
SUM OF ALL RESPONSES TO PHQ QUESTIONS 1-9: 2
SUM OF ALL RESPONSES TO PHQ QUESTIONS 1-9: 2

## 2021-01-18 NOTE — PATIENT INSTRUCTIONS
Personalized Preventive Plan for Celena Anderson - 1/18/2021  Medicare offers a range of preventive health benefits. Some of the tests and screenings are paid in full while other may be subject to a deductible, co-insurance, and/or copay. Some of these benefits include a comprehensive review of your medical history including lifestyle, illnesses that may run in your family, and various assessments and screenings as appropriate. After reviewing your medical record and screening and assessments performed today your provider may have ordered immunizations, labs, imaging, and/or referrals for you. A list of these orders (if applicable) as well as your Preventive Care list are included within your After Visit Summary for your review. Other Preventive Recommendations:    · A preventive eye exam performed by an eye specialist is recommended every 1-2 years to screen for glaucoma; cataracts, macular degeneration, and other eye disorders. · A preventive dental visit is recommended every 6 months. · Try to get at least 150 minutes of exercise per week or 10,000 steps per day on a pedometer . · Order or download the FREE \"Exercise & Physical Activity: Your Everyday Guide\" from [employer]. Call 8-162.284.5734 or search [employer] online. · You need 6693-7885 mg of calcium and 9324-4621 IU of vitamin D per day. It is possible to meet your calcium requirement with diet alone, but a vitamin D supplement is usually necessary to meet this goal.  · When exposed to the sun, use a sunscreen that protects against both UVA and UVB radiation with an SPF of 30 or greater. Reapply every 2 to 3 hours or after sweating, drying off with a towel, or swimming. · Always wear a seat belt when traveling in a car.

## 2021-01-18 NOTE — PROGRESS NOTES
Medicare Annual Wellness Visit  Name: Eusebia Bear Date: 2021   MRN: [id number] Sex: Female   Age: 80 y.o. Ethnicity: Non-/Non    : 1932 Race: Kisha Soto is here for [employer] and Hypertension (6mo follow up)    Screenings for behavioral, psychosocial and functional/safety risks, and cognitive dysfunction are all negative except as indicated below. These results, as well as other patient data from the [address] form, are documented in Flowsheets linked to this Encounter. Has had both COVID-19 vaccines tolerated this very well. Has had no adverse effects. Overall she has been very healthy had at the assisted living facility. Has had some exercise-- does her own exercise. Does her DVD, a mile walk 3-4 times. Is also doing some floor exercises. Is still not sleeping the best, about the same as it was. Has tolerated the S-Citalopram well 5 mg daily and her family does think that this did help her sleep. Has not any side effects from this. Thinks her mood is holding up pretty well. She has been participating in the activities often at the assisted living to help interact with others. Blood pressure has been well controlled and has not had any chest pain palpitation shortness of breath or dyspnea. Tolerating her medications all well and taking them regularly. Has been seeing Dr. Kerri Esparza every 6 months-- did the Labs for   Has had no recurrence from her breast cancer or her ovarian cancer wonders if she can follow here with our office rather than having the additional visits to another position due to the difficulty she has getting to the appointments. Allergies   Allergen Reactions    Amoxicillin      Hives           Prior to Visit Medications    Medication Sig Taking?  Authorizing Provider   escitalopram (LEXAPRO) 5 MG tablet Take 1 tablet by mouth daily Yes Joe Willis MD   metoprolol tartrate (LOPRESSOR) 25 MG tablet TAKE 1 TABLET TWICE DAILY Yes Madhavi Felder MD   torsemide (DEMADEX) 10 MG tablet 1 tablet 3 times weekly as needed Yes Madhavi Felder MD   ciclopirox (LOPROX) 0.77 % cream  Yes Historical Provider, MD   polyethylene glycol (GLYCOLAX) powder Take 17 g by mouth daily Yes Madhavi Felder MD   vitamin D (CHOLECALCIFEROL) 1000 UNIT TABS tablet Take 1,000 Units by mouth daily Yes Historical Provider, MD   denosumab (PROLIA) 60 MG/ML SOSY SC injection Inject 1 mL into the skin once for 1 dose  Madhavi Felder MD   zoster recombinant adjuvanted vaccine [facility]) 50 MCG/0.5ML SUSR injection Inject 0.5 mLs into the muscle See Admin Instructions 1 dose now and repeat in 2-6 months  Madhavi Felder MD   Handicap Placard MISC by Does not apply route The disability is expected to last 5 years  Dx: cancer, pain  Madhavi Felder MD         Past Medical History:   Diagnosis Date    Arthritis     neck    BRCA2 genetic carrier     no specifics given     Cancer ([facility] [address] 1982, 2006    bilat breasts    GERD (gastroesophageal reflux disease)     Hypertension     Wears glasses        Past Surgical History:   Procedure Laterality Date    ABDOMEN SURGERY  02/07/2018     EXPLORATORY LAPAROTOMY, TOTAL ABDOMINAL HYSTERECTOMY, BSO, APPENDECTOMY, OMENTUMECTOMY, RADICAL TUMOR DEBULKING USING     CATARACT REMOVAL Bilateral     COLONOSCOPY      MASTECTOMY Left 1982    MASTECTOMY Right 2006    [place] OFFICE/OUTPT VISIT,PROCEDURE ONLY N/A 2/7/2018    EXPLORATORY LAPAROTOMY, TOTAL ABDOMINAL HYSTERECTOMY, BSO, APPENDECTOMY, OMENTUMECTOMY, RADICAL TUMOR DEBULKING USING SONOPET. performed by Re Arceo MD at [address] [place] [address]  02/07/2018    UPPER GASTROINTESTINAL ENDOSCOPY  07/2016         Family History   Problem Relation Age of Onset    Breast Cancer Mother         breast cancer    Other Mother         gallstones, water retention    Other Father         parkinsons    Breast Cancer Other         states sibling    Hypertension Other         states sibling     Osteoarthritis Other         states sibling had Severe     Stroke Sister     Endometrial Cancer Sister     Breast Cancer Sister     BRCA 1/2 Sister     Hypertension Sister     Breast Cancer Daughter        CareTeam (Including outside providers/suppliers regularly involved in providing care):   Patient Care Team:  Umesh Bernal MD as PCP - General (Family Medicine)  Umesh Bernal MD as PCP - [facility]    Wt Readings from Last 3 Encounters:   01/18/21 165 lb (74.8 kg)   06/15/20 155 lb (70.3 kg)   02/17/20 154 lb (69.9 kg)     Vitals:    01/18/21 1551   BP: 136/66   Site: Right Upper Arm   Position: Sitting   Cuff Size: Large Adult   Pulse: 72   SpO2: 98%   Weight: 165 lb (74.8 kg)   Height: 5' 3\" (1.6 m)     Body mass index is 29.23 kg/m². Based upon direct observation of the patient, evaluation of cognition reveals MMSE score 27/30. Physical Exam  Vitals signs and nursing note reviewed. Constitutional:       Appearance: Normal appearance. She is well-developed. HENT:      Head: Normocephalic and atraumatic. Eyes:      Conjunctiva/sclera: Conjunctivae normal.   Neck:      Musculoskeletal: Normal range of motion and neck supple. Thyroid: No thyromegaly. Vascular: No JVD. Cardiovascular:      Rate and Rhythm: Normal rate and regular rhythm. Pulses: Normal pulses. Heart sounds: Normal heart sounds. No murmur. No friction rub. No gallop. Pulmonary:      Effort: Pulmonary effort is normal. No respiratory distress. Breath sounds: Normal breath sounds. Abdominal:      Palpations: Abdomen is soft. Musculoskeletal:      Right lower leg: Edema present. Left lower leg: Edema present. Comments: Trace lower extremity edema laterally tibial   Lymphadenopathy:      Cervical: No cervical adenopathy. Skin:     General: Skin is warm. Capillary Refill: Capillary refill takes less than 2 seconds. Neurological:      General: No focal deficit present. Mental Status: She is alert and oriented to person, place, and time. Psychiatric:         Behavior: Behavior normal.         Thought Content: Thought content normal.         Judgment: Judgment normal.        Diagnosis Orders   1. Routine general medical examination at a health care facility  DNR comfort care - arrest   2. 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 Bilateral malignant neoplasm of breast in female, unspecified estrogen receptor status, unspecified site of breast ([facility] [address]     4. Primary insomnia     5. BRCA2 genetic carrier     6. Essential hypertension       Blood pressure is well controlled at this time. No changes in her current medications    Will continue with the Escitalopram for the Insomnia and could consider increasing if desired in the future if sleep is getting more problematic. Seems content in her current living situation. Will go ahead and take over the follow up of her labs per her request. Will refer back to Oncology if there is an increase in the numbers or other symptoms. Patient's complete Health Risk Assessment and screening values have been reviewed and are found in Flowsheets. The following problems were reviewed today and where indicated follow up appointments were made and/or referrals ordered. Positive Risk Factor Screenings with Interventions:      Cognitive: Words recalled: 2 Words Recalled  Total Score Interpretation: Positive Mini-Cog  Did the patient refuse to take the cognition test?: No  Cognitive Impairment Interventions:  · MMSE score 27/30         General Health and ACP:  General  In general, how would you say your health is?: Very Good  In the past 7 days, have you experienced any of the following?  New or Increased Pain, New or Increased Fatigue, Loneliness, Social Isolation, Stress or Anger?: (!) Loneliness  Do you get the social and emotional support that for Preventive Services Due: see orders and patient instructions/AVS.  . Recommended screening schedule for the next 5-10 years is provided to the patient in written form: see Patient Instructions/AVS.    VILMA Demond Rider, PAOLA, 7/70/[birth year], performed the documented evaluation under the direct supervision of the attending physician. Reviewed and addendums made by myself on the day of the visit with the patient. Jeimy Matthew MD    Advance Care Planning   The patient has the following advanced directives on file:  Advance Directives     Power of  Living Will ACP-Advance Directive ACP-Power of     Not on File [place] on 08/10/20 Filed Not on File        Advance Care Planning   The patient has the following advanced directives on file:  Advance Directives     Power of [address] Will ACP-Advance Directive ACP-Power of     Not on File Filed on 08/10/20 Filed Not on File          The patient has appointed the following active healthcare agents:  Khris spangler demographics    The Patient has the following current code status:    Code Status: [id number]    Visit Documentation:  I discussed Advance Care Planning with Gabi Rojo today which included the patient's choices for care and treatment in the case of a health event that adversely affects decision-making abilities. She stated the Advance Care Directives on file are current. We discussed her current values, goals and care preferences at the end of life. Gabi Rojo has no questions at this time and has agreed to keep me up-to-date should anything change.          Jeimy Matthew MD  1/24/2021 yes

## 2021-02-25 ENCOUNTER — TELEPHONE (OUTPATIENT)
Dept: FAMILY MEDICINE CLINIC | Age: 86
End: 2021-02-25

## 2021-02-25 NOTE — TELEPHONE ENCOUNTER
Oncology called - patient has a history of ovarian cancer. She is in remission and is not being actively treated. She does have a port and needs it flushed every 4-6 weeks. Family is asking if you will take over her lab orders and sign the orders for flushes. She is in [place] and family is trying to decrease the amount of doctor visits that she has to go to.

## 2021-03-01 NOTE — TELEPHONE ENCOUNTER
Spoke with Kendra Tony- Patient will not be seeing Dr Carol Fountain anymore. He would previously order labs at her visit. There are not any preset labs that she needs at set intervals. She would just get whatever labs that Dr Vasquez Allan wants. Is this still ok? She will write up port flush orders and send over for you to sign.

## 2021-03-12 ENCOUNTER — TELEPHONE (OUTPATIENT)
Dept: FAMILY MEDICINE CLINIC | Age: 86
End: 2021-03-12

## 2021-03-12 NOTE — TELEPHONE ENCOUNTER
Patients daughter called and was wondering if  could write a letter to her moms  just stating that she does need to be in a assisted living home. I will let the daughter know when the letter is ready for her to .

## 2021-04-30 LAB
ALBUMIN/GLOBULIN RATIO: 1.2 G/DL
ALBUMIN: 3.5 G/DL (ref 3.5–5)
ALP BLD-CCNC: 66 UNITS/L (ref 38–126)
ALT SERPL-CCNC: 15 UNITS/L (ref 4–35)
ANION GAP SERPL CALCULATED.3IONS-SCNC: 4.4 MMOL/L
AST SERPL-CCNC: 22 UNITS/L (ref 14–36)
BASOPHILS %: 1.07 (ref 0–3)
BASOPHILS ABSOLUTE: 0.09 (ref 0–0.3)
BILIRUB SERPL-MCNC: 0.2 MG/DL (ref 0.2–1.3)
BUN BLDV-MCNC: 23 MG/DL (ref 7–17)
CA 125: 17 U/ML
CALCIUM SERPL-MCNC: 9.6 MG/DL (ref 8.4–10.2)
CHLORIDE BLD-SCNC: 106 MMOL/L (ref 98–120)
CO2: 26 MMOL/L (ref 22–31)
CREAT SERPL-MCNC: 1 MG/DL (ref 0.5–1)
EOSINOPHILS %: 3.25 (ref 0–10)
EOSINOPHILS ABSOLUTE: 0.27 (ref 0–1.1)
GFR CALCULATED: 55.5
GLOBULIN: 3 G/DL
GLUCOSE: 103 MG/DL (ref 65–105)
HCT VFR BLD CALC: 39.1 % (ref 37–47)
HEMOGLOBIN: 12.4 (ref 12–16)
LYMPHOCYTE %: 22.66 (ref 20–51.1)
LYMPHOCYTES ABSOLUTE: 1.92 (ref 1–5.5)
MCH RBC QN AUTO: 29.9 PG (ref 28.5–32.5)
MCHC RBC AUTO-ENTMCNC: 31.6 G/DL (ref 32–37)
MCV RBC AUTO: 94.7 FL (ref 80–94)
MONOCYTES %: 10.57 (ref 1.7–9.3)
MONOCYTES ABSOLUTE: 0.89 (ref 0.1–1)
NEUTROPHILS %: 62.46 (ref 42.2–75.2)
NEUTROPHILS ABSOLUTE: 5.28 (ref 2–8.1)
PDW BLD-RTO: 12.3 % (ref 8.5–15.5)
PLATELET # BLD: 126.6 THOU/MM3 (ref 130–400)
POTASSIUM SERPL-SCNC: 4.2 MMOL/L (ref 3.6–5)
RBC: 4.13 M/UL (ref 4.2–5.4)
SODIUM BLD-SCNC: 137 MMOL/L (ref 135–145)
TOTAL PROTEIN, SERUM: 6.5 G/DL (ref 6.3–8.2)
WBC: 8.5 THOU/ML3 (ref 4.8–10.8)

## 2021-05-03 ENCOUNTER — OFFICE VISIT (OUTPATIENT)
Dept: FAMILY MEDICINE CLINIC | Age: 86
End: 2021-05-03
Payer: MEDICARE

## 2021-05-03 VITALS
BODY MASS INDEX: 29.41 KG/M2 | WEIGHT: 166 LBS | SYSTOLIC BLOOD PRESSURE: 125 MMHG | DIASTOLIC BLOOD PRESSURE: 82 MMHG | HEART RATE: 54 BPM | OXYGEN SATURATION: 99 %

## 2021-05-03 DIAGNOSIS — Z15.09 MALIGNANT NEOPLASM OF RIGHT FALLOPIAN TUBE WITH BRCA2 GENE MUTATION (HCC): Chronic | ICD-10-CM

## 2021-05-03 DIAGNOSIS — M79.89 RIGHT LEG SWELLING: Primary | ICD-10-CM

## 2021-05-03 DIAGNOSIS — R60.1 GENERALIZED EDEMA: ICD-10-CM

## 2021-05-03 DIAGNOSIS — Z15.02 MALIGNANT NEOPLASM OF RIGHT FALLOPIAN TUBE WITH BRCA2 GENE MUTATION (HCC): Chronic | ICD-10-CM

## 2021-05-03 DIAGNOSIS — Z15.01 MALIGNANT NEOPLASM OF RIGHT FALLOPIAN TUBE WITH BRCA2 GENE MUTATION (HCC): Chronic | ICD-10-CM

## 2021-05-03 DIAGNOSIS — R00.1 BRADYCARDIA: ICD-10-CM

## 2021-05-03 DIAGNOSIS — I10 ESSENTIAL HYPERTENSION: ICD-10-CM

## 2021-05-03 DIAGNOSIS — C57.01 MALIGNANT NEOPLASM OF RIGHT FALLOPIAN TUBE WITH BRCA2 GENE MUTATION (HCC): Chronic | ICD-10-CM

## 2021-05-03 LAB — HUMAN EPIDIDYMIS PROTEIN 4: 119

## 2021-05-03 PROCEDURE — 1123F ACP DISCUSS/DSCN MKR DOCD: CPT | Performed by: FAMILY MEDICINE

## 2021-05-03 PROCEDURE — 4040F PNEUMOC VAC/ADMIN/RCVD: CPT | Performed by: FAMILY MEDICINE

## 2021-05-03 PROCEDURE — 93010 ELECTROCARDIOGRAM REPORT: CPT | Performed by: FAMILY MEDICINE

## 2021-05-03 PROCEDURE — 99215 OFFICE O/P EST HI 40 MIN: CPT | Performed by: FAMILY MEDICINE

## 2021-05-03 PROCEDURE — 99211 OFF/OP EST MAY X REQ PHY/QHP: CPT | Performed by: FAMILY MEDICINE

## 2021-05-03 PROCEDURE — 1036F TOBACCO NON-USER: CPT | Performed by: FAMILY MEDICINE

## 2021-05-03 PROCEDURE — 1090F PRES/ABSN URINE INCON ASSESS: CPT | Performed by: FAMILY MEDICINE

## 2021-05-03 PROCEDURE — G8427 DOCREV CUR MEDS BY ELIG CLIN: HCPCS | Performed by: FAMILY MEDICINE

## 2021-05-03 PROCEDURE — G8417 CALC BMI ABV UP PARAM F/U: HCPCS | Performed by: FAMILY MEDICINE

## 2021-05-03 PROCEDURE — 93005 ELECTROCARDIOGRAM TRACING: CPT | Performed by: FAMILY MEDICINE

## 2021-05-03 RX ORDER — ESCITALOPRAM OXALATE 5 MG/1
5 TABLET ORAL DAILY
Qty: 30 TABLET | Refills: 5 | Status: SHIPPED | OUTPATIENT
Start: 2021-05-03 | End: 2021-11-09

## 2021-05-03 RX ORDER — LISINOPRIL 10 MG/1
10 TABLET ORAL DAILY
Qty: 30 TABLET | Refills: 5 | Status: SHIPPED | OUTPATIENT
Start: 2021-05-03 | End: 2021-07-26 | Stop reason: SINTOL

## 2021-05-03 NOTE — PROGRESS NOTES
[facility]  [address]  Dept: [phone]  Dept IQV:[phone]    Mario Orantes is a 80 y.o. female who presents today for her medical conditions/complaints as notedbelow. Mario Orantes is c/o of Leg Swelling (right leg )      HPI:     HPI    Family noticed last Thursday that her right lower extremity seem to be edematous when they visited. Daughter has noticed that at times she seems a little SOB and does complain of fatigue a lot. Pauly Ayala denies SOB at rest or exertion. She denies chest pain, palpitations, PND, orthopnea. She does continue participating in activities at the assisted living as well as walking both outside and within the facility without difficulty. She denies dizziness or lightheadedness. She denies nausea vomiting or abdominal pain. She has not had any changes in her bowels. She has not had a recent changes in her medication continues on the torsemide 3 days a week and the metoprolol tartrate 25 mg twice daily. She does have a remote history of both breast cancer and ovarian cancer.   Her recent labs including a CMP and CA-125 level were unremarkable as was a CBC    BP Readings from Last 3 Encounters:   05/03/21 125/82   01/18/21 136/66   06/15/20 134/68          (goal 120/80)    Wt Readings from Last 3 Encounters:   05/03/21 166 lb (75.3 kg)   01/18/21 165 lb (74.8 kg)   06/15/20 155 lb (70.3 kg)        Past Medical History:   Diagnosis Date    Arthritis     neck    BRCA2 genetic carrier     no specifics given     Cancer ([facility] [address] 1982, 2006    bilat breasts    GERD (gastroesophageal reflux disease)     Hypertension     Wears glasses       Past Surgical History:   Procedure Laterality Date    ABDOMEN SURGERY  02/07/2018     EXPLORATORY LAPAROTOMY, TOTAL ABDOMINAL HYSTERECTOMY, BSO, APPENDECTOMY, OMENTUMECTOMY, RADICAL TUMOR DEBULKING USING     CATARACT REMOVAL Bilateral     COLONOSCOPY      MASTECTOMY Left 1982    MASTECTOMY Right 2006    [place] OFFICE/OUTPT VISIT,PROCEDURE ONLY N/A 2/7/2018    EXPLORATORY LAPAROTOMY, TOTAL ABDOMINAL HYSTERECTOMY, BSO, APPENDECTOMY, OMENTUMECTOMY, RADICAL TUMOR DEBULKING USING SONOPET. performed by Edu Murcia MD at [address] [place] [address]  02/07/2018    UPPER GASTROINTESTINAL ENDOSCOPY  07/2016       Family History   Problem Relation Age of Onset   Winston Seda Breast Cancer Mother         breast cancer    Other Mother         gallstones, water retention    Other Father         parkinsons    Breast Cancer Other         states sibling    Hypertension Other         states sibling     Osteoarthritis Other         states sibling had Severe     Stroke Sister     Endometrial Cancer Sister     Breast Cancer Sister     BRCA 1/2 Sister     Hypertension Sister     Breast Cancer Daughter        Social History     Tobacco Use    Smoking status: Never Smoker    Smokeless tobacco: Never Used   Substance Use Topics    Alcohol use: Yes     Alcohol/week: 1.0 standard drinks     Types: 1 Glasses of wine per week      Current Outpatient Medications   Medication Sig Dispense Refill    escitalopram (LEXAPRO) 5 MG tablet Take 1 tablet by mouth daily 30 tablet 5    lisinopril (PRINIVIL;ZESTRIL) 10 MG tablet Take 1 tablet by mouth daily 30 tablet 5    denosumab (PROLIA) 60 MG/ML SOSY SC injection Inject 1 mL into the skin once for 1 dose 1 mL 0    torsemide (DEMADEX) 10 MG tablet 1 tablet 3 times weekly as needed 30 tablet 5    ciclopirox (LOPROX) 0.77 % cream   0    Handicap Placard MISC by Does not apply route The disability is expected to last 5 years  Dx: cancer, pain 1 each 1    polyethylene glycol (GLYCOLAX) powder Take 17 g by mouth daily 1 Bottle 5    vitamin D (CHOLECALCIFEROL) 1000 UNIT TABS tablet Take 1,000 Units by mouth daily       No current facility-administered medications for this visit.       Allergies   Allergen Reactions    Amoxicillin      Hives Health Maintenance   Topic Date Due    Annual Wellness Visit (AWV)  01/19/2022    Potassium monitoring  04/30/2022    Creatinine monitoring  04/30/2022    DTaP/Tdap/Td vaccine (2 - Td) 01/08/2028    Flu vaccine  Completed    Shingles Vaccine  Completed    Pneumococcal 65+ years Vaccine  Completed    COVID-19 Vaccine  Completed    Hepatitis A vaccine  Aged Out    Hepatitis B vaccine  Aged Out    Hib vaccine  Aged Out    Meningococcal (ACWY) vaccine  Aged Out       Subjective:      Review of Systems    Objective:     /82 (Site: Left Upper Arm, Position: Sitting, Cuff Size: Large Adult)   Pulse 54   Wt 166 lb (75.3 kg)   LMP  (LMP Unknown)   SpO2 99%   BMI 29.41 kg/m²     Physical Exam  Vitals signs and nursing note reviewed. Constitutional:       Appearance: Normal appearance. She is well-developed. HENT:      Head: Normocephalic and atraumatic. Eyes:      Extraocular Movements: Extraocular movements intact. Conjunctiva/sclera: Conjunctivae normal.      Pupils: Pupils are equal, round, and reactive to light. Neck:      Musculoskeletal: Normal range of motion and neck supple. Thyroid: No thyromegaly. Vascular: No JVD. Cardiovascular:      Rate and Rhythm: Normal rate and regular rhythm. Pulses: Normal pulses. Heart sounds: Normal heart sounds. No murmur. No friction rub. No gallop. Pulmonary:      Effort: Pulmonary effort is normal. No respiratory distress. Breath sounds: Normal breath sounds. Abdominal:      General: Bowel sounds are normal. There is distension (minimally but saad fluid wave noted). Palpations: Abdomen is soft. There is no mass. Tenderness: There is no abdominal tenderness. There is no guarding. Musculoskeletal:      Right lower leg: Edema (3+ edema of the entire RLE with pitting onto the thigh as well) present. Left lower leg: Edema (trace to 1+ of the lower leg only) present.    Lymphadenopathy:      Cervical: No in about 2 weeks (around 5/17/2021). On this date 5/3/2021 I have spent 44 minutes reviewing previous notes, test results and face to face with the patient discussing the diagnosis and importance of compliance with the treatment plan as well as documenting on the day of the visit. Patient given educational materials - see patientinstructions. Discussed use, benefit, and side effects of prescribed medications. All patient questions answered. Pt voiced understanding. Reviewed health maintenance. Instructed to continue current medications, diet andexercise. Patient agreed with treatment plan. Follow up as directed.      (Please note that portions of this note were completed with a voice-recognition program. Efforts were made to edit the dictation but occasionally words are mis-transcribed.)    Electronically signed by Solomon Martins MD on 5/3/2021

## 2021-05-03 NOTE — PATIENT INSTRUCTIONS
Stop the metoprolol and start the lisinopril. Blood work in 1 week. Chest x-ray and Doppler of the right lower extremity today.     Holter monitor to evaluate bradycardia ordered

## 2021-05-10 LAB
ANION GAP SERPL CALCULATED.3IONS-SCNC: 5.9 MMOL/L
BUN BLDV-MCNC: 22 MG/DL (ref 7–17)
CALCIUM SERPL-MCNC: 9.7 MG/DL (ref 8.4–10.2)
CHLORIDE BLD-SCNC: 106 MMOL/L (ref 98–120)
CO2: 26 MMOL/L (ref 22–31)
CREAT SERPL-MCNC: 1 MG/DL (ref 0.5–1)
GFR CALCULATED: 55.5
GLUCOSE: 91 MG/DL (ref 65–105)
POTASSIUM SERPL-SCNC: 4.4 MMOL/L (ref 3.6–5)
SODIUM BLD-SCNC: 137 MMOL/L (ref 135–145)
TSH REFLEX FT4: 2.05 MIU/ML (ref 0.49–4.67)

## 2021-05-19 ENCOUNTER — OFFICE VISIT (OUTPATIENT)
Dept: FAMILY MEDICINE CLINIC | Age: 86
End: 2021-05-19
Payer: MEDICARE

## 2021-05-19 VITALS
OXYGEN SATURATION: 97 % | BODY MASS INDEX: 29.41 KG/M2 | SYSTOLIC BLOOD PRESSURE: 138 MMHG | HEART RATE: 68 BPM | DIASTOLIC BLOOD PRESSURE: 76 MMHG | WEIGHT: 166 LBS

## 2021-05-19 DIAGNOSIS — Z15.09 MALIGNANT NEOPLASM OF RIGHT FALLOPIAN TUBE WITH BRCA2 GENE MUTATION (HCC): Primary | ICD-10-CM

## 2021-05-19 DIAGNOSIS — M79.89 RIGHT LEG SWELLING: ICD-10-CM

## 2021-05-19 DIAGNOSIS — Z15.02 MALIGNANT NEOPLASM OF RIGHT FALLOPIAN TUBE WITH BRCA2 GENE MUTATION (HCC): Primary | ICD-10-CM

## 2021-05-19 DIAGNOSIS — Z15.01 MALIGNANT NEOPLASM OF RIGHT FALLOPIAN TUBE WITH BRCA2 GENE MUTATION (HCC): Primary | ICD-10-CM

## 2021-05-19 DIAGNOSIS — R00.1 BRADYCARDIA: ICD-10-CM

## 2021-05-19 DIAGNOSIS — C57.01 MALIGNANT NEOPLASM OF RIGHT FALLOPIAN TUBE WITH BRCA2 GENE MUTATION (HCC): Primary | ICD-10-CM

## 2021-05-19 PROCEDURE — 99214 OFFICE O/P EST MOD 30 MIN: CPT | Performed by: FAMILY MEDICINE

## 2021-05-19 PROCEDURE — 4040F PNEUMOC VAC/ADMIN/RCVD: CPT | Performed by: FAMILY MEDICINE

## 2021-05-19 PROCEDURE — 1123F ACP DISCUSS/DSCN MKR DOCD: CPT | Performed by: FAMILY MEDICINE

## 2021-05-19 PROCEDURE — G8427 DOCREV CUR MEDS BY ELIG CLIN: HCPCS | Performed by: FAMILY MEDICINE

## 2021-05-19 PROCEDURE — 1036F TOBACCO NON-USER: CPT | Performed by: FAMILY MEDICINE

## 2021-05-19 PROCEDURE — G8417 CALC BMI ABV UP PARAM F/U: HCPCS | Performed by: FAMILY MEDICINE

## 2021-05-19 PROCEDURE — 1090F PRES/ABSN URINE INCON ASSESS: CPT | Performed by: FAMILY MEDICINE

## 2021-05-19 PROCEDURE — 99213 OFFICE O/P EST LOW 20 MIN: CPT | Performed by: FAMILY MEDICINE

## 2021-05-19 NOTE — PROGRESS NOTES
[facility]  [address]  Dept: [phone]  Dept XOV:[phone]    Pantera Stevens is a 80 y.o. female who presents today for her medical conditions/complaints as notedbelow. Pantera Stevens is c/o of Leg Swelling (patient states not any better) and Other (never got a phone call for heart monitor)      HPI:     HPI    The swelling in the left leg is better but the swelling on the right is only minimally better than it was. SOB with exertion is stable. She has been taking higher dose of the water pill. She is tolerating this well. She is not any dizziness or lightheadedness. Laboratory studies looking for recurrence of her cancers with a CA-125 and a T4 were both unremarkable. CMP and CBC were unremarkable as well. Has a sore on the right buttock are with a bit of discomfort-- thought may be a bit. Maybe 10 days ago noticed this.        BP Readings from Last 3 Encounters:   05/19/21 138/76   05/03/21 125/82   01/18/21 136/66          (goal 120/80)    Wt Readings from Last 3 Encounters:   05/19/21 166 lb (75.3 kg)   05/03/21 166 lb (75.3 kg)   01/18/21 165 lb (74.8 kg)        Past Medical History:   Diagnosis Date    Arthritis     neck    BRCA2 genetic carrier     no specifics given     Cancer ([facility] [address] 1982, 2006    bilat breasts    GERD (gastroesophageal reflux disease)     Hypertension     Wears glasses       Past Surgical History:   Procedure Laterality Date    ABDOMEN SURGERY  02/07/2018     EXPLORATORY LAPAROTOMY, TOTAL ABDOMINAL HYSTERECTOMY, BSO, APPENDECTOMY, OMENTUMECTOMY, RADICAL TUMOR DEBULKING USING     CATARACT REMOVAL Bilateral     COLONOSCOPY      MASTECTOMY Left 1982    MASTECTOMY Right 2006    [place] OFFICE/OUTPT VISIT,PROCEDURE ONLY N/A 2/7/2018    EXPLORATORY LAPAROTOMY, TOTAL ABDOMINAL HYSTERECTOMY, BSO, APPENDECTOMY, OMENTUMECTOMY, RADICAL TUMOR DEBULKING USING SONOPET. performed by Leslie Gandhi MD completed with a voice-recognition program. Efforts were made to edit the dictation but occasionally words are mis-transcribed.)    Electronically signed by Marty Sullivan MD on 5/23/2021

## 2021-05-25 ENCOUNTER — TELEPHONE (OUTPATIENT)
Dept: FAMILY MEDICINE CLINIC | Age: 86
End: 2021-05-25

## 2021-05-25 DIAGNOSIS — R60.0 BILATERAL LEG EDEMA: ICD-10-CM

## 2021-05-25 RX ORDER — TORSEMIDE 10 MG/1
10 TABLET ORAL DAILY
Qty: 30 TABLET | Refills: 5
Start: 2021-05-25 | End: 2021-06-18 | Stop reason: SDUPTHER

## 2021-05-25 NOTE — TELEPHONE ENCOUNTER
CT scan was unremarkable. Did not show any worrisome lymph nodes or masses. No signs of clots in the pelvic blood vessels. No real explanation for her significant swelling. I plan to have her go ahead and increase her torsemide to 1 tablet daily as I still have it listed that she is only taking it 3 days a week. Have [facility] check electrolytes BUN and creatinine in 1 week. Please notify both [address] her daughter and [facility].

## 2021-05-26 ENCOUNTER — TELEPHONE (OUTPATIENT)
Dept: FAMILY MEDICINE CLINIC | Age: 86
End: 2021-05-26

## 2021-06-03 ENCOUNTER — TELEPHONE (OUTPATIENT)
Dept: FAMILY MEDICINE CLINIC | Age: 86
End: 2021-06-03

## 2021-06-03 NOTE — TELEPHONE ENCOUNTER
Ho called and said that they had a ania effect fall at their facility. Patient fell and hurt left forearm and wrist.  Radiology was called in to take xray. Report will be sent when completed.

## 2021-06-08 LAB
ANION GAP SERPL CALCULATED.3IONS-SCNC: 8.3 MMOL/L
BUN BLDV-MCNC: 22 MG/DL (ref 7–17)
CHLORIDE BLD-SCNC: 102 MMOL/L (ref 98–120)
CO2: 25 MMOL/L (ref 22–31)
CREAT SERPL-MCNC: 1.1 MG/DL (ref 0.5–1)
GFR CALCULATED: 49.7
POTASSIUM SERPL-SCNC: 4.5 MMOL/L (ref 3.6–5)
SODIUM BLD-SCNC: 135 MMOL/L (ref 135–145)

## 2021-06-18 DIAGNOSIS — R00.1 BRADYCARDIA: ICD-10-CM

## 2021-06-18 DIAGNOSIS — R60.0 BILATERAL LEG EDEMA: ICD-10-CM

## 2021-06-18 RX ORDER — TORSEMIDE 10 MG/1
10 TABLET ORAL DAILY
Qty: 30 TABLET | Refills: 5 | Status: SHIPPED | OUTPATIENT
Start: 2021-06-18 | End: 2021-07-26

## 2021-06-21 ENCOUNTER — TELEPHONE (OUTPATIENT)
Dept: FAMILY MEDICINE CLINIC | Age: 86
End: 2021-06-21

## 2021-06-24 ENCOUNTER — TELEPHONE (OUTPATIENT)
Dept: FAMILY MEDICINE CLINIC | Age: 86
End: 2021-06-24

## 2021-06-24 NOTE — TELEPHONE ENCOUNTER
OK to cancel the PT-- Enoch requested to help her stay active as her wrist was healing-- not to be using her wrist.  No problem.

## 2021-07-13 ENCOUNTER — TELEPHONE (OUTPATIENT)
Dept: FAMILY MEDICINE CLINIC | Age: 86
End: 2021-07-13

## 2021-07-26 ENCOUNTER — OFFICE VISIT (OUTPATIENT)
Dept: FAMILY MEDICINE CLINIC | Age: 86
End: 2021-07-26
Payer: MEDICARE

## 2021-07-26 VITALS
OXYGEN SATURATION: 96 % | HEART RATE: 66 BPM | DIASTOLIC BLOOD PRESSURE: 64 MMHG | WEIGHT: 162 LBS | SYSTOLIC BLOOD PRESSURE: 142 MMHG | BODY MASS INDEX: 28.7 KG/M2

## 2021-07-26 DIAGNOSIS — I10 ESSENTIAL HYPERTENSION: Primary | ICD-10-CM

## 2021-07-26 DIAGNOSIS — R60.0 BILATERAL LEG EDEMA: ICD-10-CM

## 2021-07-26 DIAGNOSIS — R05.8 ACE-INHIBITOR COUGH: ICD-10-CM

## 2021-07-26 DIAGNOSIS — T46.4X5A ACE-INHIBITOR COUGH: ICD-10-CM

## 2021-07-26 PROBLEM — D68.9 COAGULATION DEFECT (HCC): Status: ACTIVE | Noted: 2021-07-26

## 2021-07-26 PROCEDURE — 1123F ACP DISCUSS/DSCN MKR DOCD: CPT | Performed by: FAMILY MEDICINE

## 2021-07-26 PROCEDURE — 1036F TOBACCO NON-USER: CPT | Performed by: FAMILY MEDICINE

## 2021-07-26 PROCEDURE — 99214 OFFICE O/P EST MOD 30 MIN: CPT | Performed by: FAMILY MEDICINE

## 2021-07-26 PROCEDURE — G8417 CALC BMI ABV UP PARAM F/U: HCPCS | Performed by: FAMILY MEDICINE

## 2021-07-26 PROCEDURE — 1090F PRES/ABSN URINE INCON ASSESS: CPT | Performed by: FAMILY MEDICINE

## 2021-07-26 PROCEDURE — G8427 DOCREV CUR MEDS BY ELIG CLIN: HCPCS | Performed by: FAMILY MEDICINE

## 2021-07-26 PROCEDURE — 99212 OFFICE O/P EST SF 10 MIN: CPT | Performed by: FAMILY MEDICINE

## 2021-07-26 PROCEDURE — 4040F PNEUMOC VAC/ADMIN/RCVD: CPT | Performed by: FAMILY MEDICINE

## 2021-07-26 RX ORDER — TORSEMIDE 10 MG/1
10 TABLET ORAL DAILY
Qty: 30 TABLET | Refills: 5
Start: 2021-07-26 | End: 2021-12-28

## 2021-07-26 RX ORDER — LOSARTAN POTASSIUM 50 MG/1
50 TABLET ORAL DAILY
Qty: 30 TABLET | Refills: 5 | Status: SHIPPED | OUTPATIENT
Start: 2021-07-26 | End: 2022-01-11

## 2021-07-26 RX ORDER — FUROSEMIDE 20 MG/1
10 TABLET ORAL DAILY
COMMUNITY
End: 2021-07-26 | Stop reason: ALTCHOICE

## 2021-07-26 SDOH — ECONOMIC STABILITY: FOOD INSECURITY: WITHIN THE PAST 12 MONTHS, THE FOOD YOU BOUGHT JUST DIDN'T LAST AND YOU DIDN'T HAVE MONEY TO GET MORE.: NEVER TRUE

## 2021-07-26 SDOH — ECONOMIC STABILITY: TRANSPORTATION INSECURITY
IN THE PAST 12 MONTHS, HAS THE LACK OF TRANSPORTATION KEPT YOU FROM MEDICAL APPOINTMENTS OR FROM GETTING MEDICATIONS?: NO

## 2021-07-26 SDOH — ECONOMIC STABILITY: TRANSPORTATION INSECURITY
IN THE PAST 12 MONTHS, HAS LACK OF TRANSPORTATION KEPT YOU FROM MEETINGS, WORK, OR FROM GETTING THINGS NEEDED FOR DAILY LIVING?: NO

## 2021-07-26 SDOH — ECONOMIC STABILITY: FOOD INSECURITY: WITHIN THE PAST 12 MONTHS, YOU WORRIED THAT YOUR FOOD WOULD RUN OUT BEFORE YOU GOT MONEY TO BUY MORE.: NEVER TRUE

## 2021-07-26 ASSESSMENT — SOCIAL DETERMINANTS OF HEALTH (SDOH): HOW HARD IS IT FOR YOU TO PAY FOR THE VERY BASICS LIKE FOOD, HOUSING, MEDICAL CARE, AND HEATING?: NOT HARD AT ALL

## 2021-07-26 NOTE — PROGRESS NOTES
[address] [facility]  [address]  Dept: [phone]  Dept DBT:[phone]    Dewayne Moulton is a 80 y.o. female who presents today for her medical conditions/complaints as notedbelow. Dewayne Moulton is c/o of Hypertension (3 mo follow up) and Lesion(s) (spot on her back that she would like checked)      HPI:     Hypertension  This is a chronic problem. The current episode started more than 1 year ago. The problem is unchanged. The problem is controlled. Pertinent negatives include no anxiety, blurred vision, chest pain, headaches, malaise/fatigue, neck pain, orthopnea, palpitations, peripheral edema, PND, shortness of breath or sweats. There are no associated agents to hypertension. Risk factors for coronary artery disease include post-menopausal state. Past treatments include diuretics and ACE inhibitors. The current treatment provides significant improvement. There are no compliance problems. There is no history of angina, kidney disease, CAD/MI, CVA, heart failure, left ventricular hypertrophy, PVD or retinopathy. Does cough some, like she gets phlegm I her throat. Her daughters have noticed this more -- seems like every time they see her she is coughing, Coughing in the night at times. At least a few times per week will take some cough syrup. Has been going on for at least several months. Not sure if this really started when she started the ACE inhibitor if it was going on prior to that when she was on the beta-blocker.       BP Readings from Last 3 Encounters:   07/26/21 (!) 142/64   05/19/21 138/76   05/03/21 125/82          (goal 120/80)    Wt Readings from Last 3 Encounters:   07/26/21 162 lb (73.5 kg)   05/19/21 166 lb (75.3 kg)   05/03/21 166 lb (75.3 kg)        Past Medical History:   Diagnosis Date    Arthritis     neck    BRCA2 genetic carrier     no specifics given     Cancer ([facility] [address] 1982, 2006    bilat breasts    Closed fracture of left wrist 06/03/2021    GERD (gastroesophageal reflux disease)     Hypertension     Wears glasses       Past Surgical History:   Procedure Laterality Date    ABDOMEN SURGERY  02/07/2018     EXPLORATORY LAPAROTOMY, TOTAL ABDOMINAL HYSTERECTOMY, BSO, APPENDECTOMY, OMENTUMECTOMY, RADICAL TUMOR DEBULKING USING     CATARACT REMOVAL Bilateral     COLONOSCOPY      MASTECTOMY Left 1982    MASTECTOMY Right 2006    [place] OFFICE/OUTPT VISIT,PROCEDURE ONLY N/A 2/7/2018    EXPLORATORY LAPAROTOMY, TOTAL ABDOMINAL HYSTERECTOMY, BSO, APPENDECTOMY, OMENTUMECTOMY, RADICAL TUMOR DEBULKING USING SONOPET. performed by Dorothea Sandy MD at [address] [place] [address]  02/07/2018    UPPER GASTROINTESTINAL ENDOSCOPY  07/2016       Family History   Problem Relation Age of Onset    Breast Cancer Mother         breast cancer    Other Mother         gallstones, water retention    Other Father         parkinsons    Breast Cancer Other         states sibling    Hypertension Other         states sibling     Osteoarthritis Other         states sibling had Severe     Stroke Sister     Endometrial Cancer Sister     Breast Cancer Sister     BRCA 1/2 Sister     Hypertension Sister     Breast Cancer Daughter        Social History     Tobacco Use    Smoking status: Never Smoker    Smokeless tobacco: Never Used   Substance Use Topics    Alcohol use:  Yes     Alcohol/week: 1.0 standard drinks     Types: 1 Glasses of wine per week      Current Outpatient Medications   Medication Sig Dispense Refill    torsemide (DEMADEX) 10 MG tablet Take 1 tablet by mouth daily 30 tablet 5    losartan (COZAAR) 50 MG tablet Take 1 tablet by mouth daily 30 tablet 5    escitalopram (LEXAPRO) 5 MG tablet Take 1 tablet by mouth daily 30 tablet 5    ciclopirox (LOPROX) 0.77 % cream   0    polyethylene glycol (GLYCOLAX) powder Take 17 g by mouth daily 1 Bottle 5    vitamin D (CHOLECALCIFEROL) 1000 UNIT TABS tablet Take 1,000 Units by mouth daily      denosumab (PROLIA) 60 MG/ML SOSY SC injection Inject 1 mL into the skin once for 1 dose 1 mL 0    Handicap Placard MISC by Does not apply route The disability is expected to last 5 years  Dx: cancer, pain 1 each 1     No current facility-administered medications for this visit. Allergies   Allergen Reactions    Amoxicillin      Hives         Health Maintenance   Topic Date Due    Flu vaccine (1) 09/01/2021    Annual Wellness Visit (AWV)  01/19/2022    Potassium monitoring  06/08/2022    Creatinine monitoring  06/08/2022    DTaP/Tdap/Td vaccine (2 - Td or Tdap) 01/08/2028    Shingles Vaccine  Completed    Pneumococcal 65+ years Vaccine  Completed    COVID-19 Vaccine  Completed    Hepatitis A vaccine  Aged Out    Hepatitis B vaccine  Aged Out    Hib vaccine  Aged Out    Meningococcal (ACWY) vaccine  Aged Out       Subjective:      Review of Systems   Constitutional: Negative for malaise/fatigue. Eyes: Negative for blurred vision. Respiratory: Negative for shortness of breath. Cardiovascular: Negative for chest pain, palpitations, orthopnea and PND. Musculoskeletal: Negative for neck pain. Neurological: Negative for headaches. Objective:     BP (!) 142/64   Pulse 66   Wt 162 lb (73.5 kg)   LMP  (LMP Unknown)   SpO2 96%   BMI 28.70 kg/m²     Physical Exam  Vitals and nursing note reviewed. Constitutional:       Appearance: Normal appearance. She is well-developed. HENT:      Head: Normocephalic and atraumatic. Eyes:      Extraocular Movements: Extraocular movements intact. Conjunctiva/sclera: Conjunctivae normal.      Pupils: Pupils are equal, round, and reactive to light. Neck:      Thyroid: No thyromegaly. Vascular: No JVD. Cardiovascular:      Rate and Rhythm: Normal rate and regular rhythm. Pulses: Normal pulses. Heart sounds: Normal heart sounds. No murmur heard. No friction rub. No gallop.     Pulmonary:      Effort: Pulmonary effort is normal. No respiratory distress. Breath sounds: Normal breath sounds. Abdominal:      General: Bowel sounds are normal. There is no distension. Palpations: Abdomen is soft. There is no mass. Tenderness: There is no abdominal tenderness. There is no guarding. Musculoskeletal:      Cervical back: Normal range of motion and neck supple. Right lower leg: Edema (Trace ankle edema on the right) present. Left lower leg: No edema (No significant lower extremity edema on the left). Lymphadenopathy:      Cervical: No cervical adenopathy. Skin:     General: Skin is warm. Capillary Refill: Capillary refill takes less than 2 seconds. Neurological:      General: No focal deficit present. Mental Status: She is alert and oriented to person, place, and time. Psychiatric:         Mood and Affect: Mood normal.         Behavior: Behavior normal.         Thought Content: Thought content normal.         Judgment: Judgment normal.         Assessment/Plan:     1. Essential hypertension  -     losartan (COZAAR) 50 MG tablet; Take 1 tablet by mouth daily, Disp-30 tablet, R-5Normal  2. Bilateral leg edema  -     torsemide (DEMADEX) 10 MG tablet; Take 1 tablet by mouth daily, Disp-30 tablet, R-5NO PRINT  3. ACE-inhibitor cough    Blood pressure is well controlled at this time. I am concerned the ACE inhibitor may be causing her cough. We will go ahead and change her to losartan and see if this cough improves and continue to monitor the blood pressure closely. Doing much better on the torsemide at the 10 mg daily. Continue this and will check labs regularly to ensure not over drying.     Lab Results   Component Value Date    WBC 8.5 04/30/2021    HGB 12.4 04/30/2021    HCT 39.1 04/30/2021    .6 (L) 04/30/2021    ALT 15 04/30/2021    AST 22 04/30/2021     06/08/2021    K 4.5 06/08/2021     06/08/2021    CREATININE 1.1 (H) 06/08/2021    BUN 22 (H) 06/08/2021    CO2

## 2021-08-01 ASSESSMENT — ENCOUNTER SYMPTOMS
BLURRED VISION: 0
ORTHOPNEA: 0
SHORTNESS OF BREATH: 0

## 2021-08-24 LAB — CALCIUM SERPL-MCNC: 9.9 MG/DL (ref 8.4–10.2)

## 2021-12-28 DIAGNOSIS — R60.0 BILATERAL LEG EDEMA: ICD-10-CM

## 2021-12-28 RX ORDER — TORSEMIDE 10 MG/1
TABLET ORAL
Qty: 30 TABLET | Refills: 0 | Status: SHIPPED | OUTPATIENT
Start: 2021-12-28 | End: 2022-01-26 | Stop reason: SDUPTHER

## 2022-01-11 DIAGNOSIS — I10 ESSENTIAL HYPERTENSION: ICD-10-CM

## 2022-01-11 NOTE — TELEPHONE ENCOUNTER
Yazan Waldrop is requesting a refill on the following medication(s):  Requested Prescriptions     Pending Prescriptions Disp Refills    losartan (COZAAR) 50 MG tablet [Pharmacy Med Name: LOSARTAN POTASSIUM 50 MG TAB] 30 tablet 5     Sig: take 1 tablet by mouth once daily       Last Visit Date (If Applicable):  0/35/1025    Next Visit Date:    1/26/2022

## 2022-01-12 RX ORDER — LOSARTAN POTASSIUM 50 MG/1
TABLET ORAL
Qty: 30 TABLET | Refills: 5 | Status: SHIPPED | OUTPATIENT
Start: 2022-01-12 | End: 2022-01-26 | Stop reason: SDUPTHER

## 2022-01-26 ENCOUNTER — OFFICE VISIT (OUTPATIENT)
Dept: FAMILY MEDICINE CLINIC | Age: 87
End: 2022-01-26
Payer: MEDICARE

## 2022-01-26 VITALS
HEIGHT: 63 IN | HEART RATE: 70 BPM | OXYGEN SATURATION: 96 % | BODY MASS INDEX: 29.41 KG/M2 | DIASTOLIC BLOOD PRESSURE: 76 MMHG | WEIGHT: 166 LBS | SYSTOLIC BLOOD PRESSURE: 116 MMHG

## 2022-01-26 DIAGNOSIS — C57.01 MALIGNANT NEOPLASM OF RIGHT FALLOPIAN TUBE WITH BRCA2 GENE MUTATION (HCC): ICD-10-CM

## 2022-01-26 DIAGNOSIS — Z15.01 MALIGNANT NEOPLASM OF RIGHT FALLOPIAN TUBE WITH BRCA2 GENE MUTATION (HCC): ICD-10-CM

## 2022-01-26 DIAGNOSIS — Z15.02 MALIGNANT NEOPLASM OF RIGHT FALLOPIAN TUBE WITH BRCA2 GENE MUTATION (HCC): ICD-10-CM

## 2022-01-26 DIAGNOSIS — R41.89 COGNITIVE DEFICITS: ICD-10-CM

## 2022-01-26 DIAGNOSIS — K59.04 CHRONIC IDIOPATHIC CONSTIPATION: ICD-10-CM

## 2022-01-26 DIAGNOSIS — Z95.828 PORT-A-CATH IN PLACE: ICD-10-CM

## 2022-01-26 DIAGNOSIS — I10 PRIMARY HYPERTENSION: Primary | ICD-10-CM

## 2022-01-26 DIAGNOSIS — Z15.09 MALIGNANT NEOPLASM OF RIGHT FALLOPIAN TUBE WITH BRCA2 GENE MUTATION (HCC): ICD-10-CM

## 2022-01-26 DIAGNOSIS — R60.0 BILATERAL LEG EDEMA: ICD-10-CM

## 2022-01-26 DIAGNOSIS — F51.01 PRIMARY INSOMNIA: ICD-10-CM

## 2022-01-26 DIAGNOSIS — I10 ESSENTIAL HYPERTENSION: ICD-10-CM

## 2022-01-26 DIAGNOSIS — R53.82 CHRONIC FATIGUE: ICD-10-CM

## 2022-01-26 DIAGNOSIS — G30.9 ALZHEIMER'S DISEASE, UNSPECIFIED (CODE) (HCC): ICD-10-CM

## 2022-01-26 LAB
ALBUMIN/GLOBULIN RATIO: 1.3 G/DL
ALBUMIN: 4.3 G/DL (ref 3.5–5)
ALP BLD-CCNC: 71 UNITS/L (ref 38–126)
ALT SERPL-CCNC: 18 UNITS/L (ref 4–35)
ANION GAP SERPL CALCULATED.3IONS-SCNC: 6.6 MMOL/L
AST SERPL-CCNC: 27 UNITS/L (ref 14–36)
BASOPHILS %: 0.77 (ref 0–3)
BASOPHILS ABSOLUTE: 0.07 (ref 0–0.3)
BILIRUB SERPL-MCNC: 0.5 MG/DL (ref 0.2–1.3)
BUN BLDV-MCNC: 28 MG/DL (ref 7–17)
CALCIUM SERPL-MCNC: 9.4 MG/DL (ref 8.4–10.2)
CHLORIDE BLD-SCNC: 104 MMOL/L (ref 98–120)
CO2: 25 MMOL/L (ref 22–31)
CREAT SERPL-MCNC: 1.1 MG/DL (ref 0.5–1)
EOSINOPHILS %: 1.78 (ref 0–10)
EOSINOPHILS ABSOLUTE: 0.17 (ref 0–1.1)
GFR CALCULATED: 49.6
GLOBULIN: 3.2 G/DL
GLUCOSE: 79 MG/DL (ref 65–105)
HCT VFR BLD CALC: 36.8 % (ref 37–47)
HEMOGLOBIN: 12.9 (ref 12–16)
LYMPHOCYTE %: 20.5 (ref 20–51.1)
LYMPHOCYTES ABSOLUTE: 1.92 (ref 1–5.5)
MCH RBC QN AUTO: 30.8 PG (ref 28.5–32.5)
MCHC RBC AUTO-ENTMCNC: 35.1 G/DL (ref 32–37)
MCV RBC AUTO: 87.6 FL (ref 80–94)
MONOCYTES %: 10.95 (ref 1.7–9.3)
MONOCYTES ABSOLUTE: 1.02 (ref 0.1–1)
NEUTROPHILS %: 66 (ref 42.2–75.2)
NEUTROPHILS ABSOLUTE: 6.17 (ref 2–8.1)
PDW BLD-RTO: 10.6 % (ref 8.5–15.5)
PLATELET # BLD: 177.2 THOU/MM3 (ref 130–400)
POTASSIUM SERPL-SCNC: 4.3 MMOL/L (ref 3.6–5)
RBC: 4.2 M/UL (ref 4.2–5.4)
SODIUM BLD-SCNC: 136 MMOL/L (ref 135–145)
TOTAL PROTEIN, SERUM: 7.5 G/DL (ref 6.3–8.2)
TSH REFLEX FT4: 2.11 MIU/ML (ref 0.49–4.67)
VITAMIN B-12: 542 PG/ML (ref 239–931)
WBC: 9.3 THOU/ML3 (ref 4.8–10.8)

## 2022-01-26 PROCEDURE — 1036F TOBACCO NON-USER: CPT | Performed by: FAMILY MEDICINE

## 2022-01-26 PROCEDURE — 99211 OFF/OP EST MAY X REQ PHY/QHP: CPT | Performed by: FAMILY MEDICINE

## 2022-01-26 PROCEDURE — 99214 OFFICE O/P EST MOD 30 MIN: CPT | Performed by: FAMILY MEDICINE

## 2022-01-26 PROCEDURE — 1090F PRES/ABSN URINE INCON ASSESS: CPT | Performed by: FAMILY MEDICINE

## 2022-01-26 PROCEDURE — G8427 DOCREV CUR MEDS BY ELIG CLIN: HCPCS | Performed by: FAMILY MEDICINE

## 2022-01-26 PROCEDURE — 1123F ACP DISCUSS/DSCN MKR DOCD: CPT | Performed by: FAMILY MEDICINE

## 2022-01-26 PROCEDURE — G8482 FLU IMMUNIZE ORDER/ADMIN: HCPCS | Performed by: FAMILY MEDICINE

## 2022-01-26 PROCEDURE — G8417 CALC BMI ABV UP PARAM F/U: HCPCS | Performed by: FAMILY MEDICINE

## 2022-01-26 PROCEDURE — 4040F PNEUMOC VAC/ADMIN/RCVD: CPT | Performed by: FAMILY MEDICINE

## 2022-01-26 RX ORDER — TORSEMIDE 10 MG/1
TABLET ORAL
Qty: 90 TABLET | Refills: 3 | Status: SHIPPED | OUTPATIENT
Start: 2022-01-26 | End: 2022-07-28 | Stop reason: DRUGHIGH

## 2022-01-26 RX ORDER — ESCITALOPRAM OXALATE 5 MG/1
TABLET ORAL
Qty: 90 TABLET | Refills: 3 | Status: SHIPPED | OUTPATIENT
Start: 2022-01-26 | End: 2022-10-27 | Stop reason: SDUPTHER

## 2022-01-26 RX ORDER — DONEPEZIL HYDROCHLORIDE 5 MG/1
5 TABLET, FILM COATED ORAL NIGHTLY
Qty: 30 TABLET | Refills: 3 | Status: SHIPPED | OUTPATIENT
Start: 2022-01-26 | End: 2022-02-23 | Stop reason: SDUPTHER

## 2022-01-26 RX ORDER — POLYETHYLENE GLYCOL 3350 17 G/17G
17 POWDER, FOR SOLUTION ORAL DAILY
Qty: 850 G | Refills: 5 | COMMUNITY
Start: 2022-01-26

## 2022-01-26 RX ORDER — LOSARTAN POTASSIUM 50 MG/1
TABLET ORAL
Qty: 90 TABLET | Refills: 3 | Status: SHIPPED | OUTPATIENT
Start: 2022-01-26

## 2022-01-26 ASSESSMENT — PATIENT HEALTH QUESTIONNAIRE - PHQ9
2. FEELING DOWN, DEPRESSED OR HOPELESS: 0
SUM OF ALL RESPONSES TO PHQ QUESTIONS 1-9: 0
SUM OF ALL RESPONSES TO PHQ9 QUESTIONS 1 & 2: 0
SUM OF ALL RESPONSES TO PHQ QUESTIONS 1-9: 0
SUM OF ALL RESPONSES TO PHQ QUESTIONS 1-9: 0
1. LITTLE INTEREST OR PLEASURE IN DOING THINGS: 0
SUM OF ALL RESPONSES TO PHQ QUESTIONS 1-9: 0

## 2022-01-26 NOTE — PROGRESS NOTES
[address] [facility]  [address]  Dept: [phone]  Dept Cassidy iZmmerman is a 80 y.o. female who presents today for her medical conditions/complaints as notedbelow. Ming Ivy is c/o of 6 Month Follow-Up and Hypertension      HPI:     HPI    Is not really doing a lot of activities, bingo when she wants to and a few other activities. Doing her own exercises in her room-- her walking DVD a few days a week. No dizziness or Chest pain. Gets up from the floor fine when she does these activities. Has not had any CP/ SOB/ or palpitations. Her LE edema has been well controlled. Has been compliant with her medications. Daughter sets up her med case and then she takes her meds herself and has been doing well with this. Does use her miralax regularly    Appetite is fine. Has had more good days and bad days with confusion. Daughters have all noted an increase in her short term memories issues. Forgets when she is supposed to be picked up for appointments, will ask the same questions several times for example. Has the naif cath in place and has not used this since her chemo. Wonders about having this removed. Has not had any complications but has to have this flushed regularly. Would like removed if possible.     BP Readings from Last 3 Encounters:   01/26/22 116/76   07/26/21 (!) 142/64   05/19/21 138/76          (goal 120/80)    Wt Readings from Last 3 Encounters:   01/26/22 166 lb (75.3 kg)   07/26/21 162 lb (73.5 kg)   05/19/21 166 lb (75.3 kg)        Past Medical History:   Diagnosis Date    Arthritis     neck    BRCA2 genetic carrier     no specifics given     Cancer ([facility] [address] 1982, 2006    bilat breasts    Closed fracture of left wrist 06/03/2021    GERD (gastroesophageal reflux disease)     Hypertension     Wears glasses       Past Surgical History:   Procedure Laterality Date    ABDOMEN SURGERY  02/07/2018 EXPLORATORY LAPAROTOMY, TOTAL ABDOMINAL HYSTERECTOMY, BSO, APPENDECTOMY, OMENTUMECTOMY, RADICAL TUMOR DEBULKING USING     CATARACT REMOVAL Bilateral     COLONOSCOPY      MASTECTOMY Left 1982    MASTECTOMY Right 2006    [place] OFFICE/OUTPT VISIT,PROCEDURE ONLY N/A 2/7/2018    EXPLORATORY LAPAROTOMY, TOTAL ABDOMINAL HYSTERECTOMY, BSO, APPENDECTOMY, OMENTUMECTOMY, RADICAL TUMOR DEBULKING USING SONOPET. performed by [facility]y Saucedo [facility] at [facility] [address]  02/07/2018    UPPER GASTROINTESTINAL ENDOSCOPY  07/2016       Family History   Problem Relation Age of Onset    Breast Cancer Mother         breast cancer    Other Mother         gallstones, water retention    Other Father         parkinsons    Breast Cancer Other         states sibling    Hypertension Other         states sibling     Osteoarthritis Other         states sibling had Severe     Stroke Sister     Endometrial Cancer Sister     Breast Cancer Sister     BRCA 1/2 Sister     Hypertension Sister     Breast Cancer Daughter        Social History     Tobacco Use    Smoking status: Never Smoker    Smokeless tobacco: Never Used   Substance Use Topics    Alcohol use:  Yes     Alcohol/week: 1.0 standard drink     Types: 1 Glasses of wine per week      Current Outpatient Medications   Medication Sig Dispense Refill    losartan (COZAAR) 50 MG tablet take 1 tablet by mouth once daily 90 tablet 3    escitalopram (LEXAPRO) 5 MG tablet take 1 tablet by mouth once daily 90 tablet 3    torsemide (DEMADEX) 10 MG tablet take 1 tablet by mouth once daily 90 tablet 3    polyethylene glycol (GLYCOLAX) 17 GM/SCOOP powder Take 17 g by mouth daily 850 g 5    donepezil (ARICEPT) 5 MG tablet Take 1 tablet by mouth nightly 30 tablet 3    ciclopirox (LOPROX) 0.77 % cream   0    Handicap Placard MISC by Does not apply route The disability is expected to last 5 years  Dx: cancer, pain 1 each 1    vitamin D (CHOLECALCIFEROL) 1000 UNIT TABS tablet Take 1,000 Units by mouth daily      denosumab (PROLIA) 60 MG/ML SOSY SC injection Inject 1 mL into the skin once for 1 dose 1 mL 0     No current facility-administered medications for this visit. Allergies   Allergen Reactions    Amoxicillin      Hives         Health Maintenance   Topic Date Due    COVID-19 Vaccine (3 - Booster for leaselock series) 06/13/2021    Annual Wellness Visit (AWV)  01/19/2022    Depression Screen  01/26/2023    Potassium monitoring  01/26/2023    Creatinine monitoring  01/26/2023    DTaP/Tdap/Td vaccine (2 - Td or Tdap) 01/08/2028    Flu vaccine  Completed    Shingles Vaccine  Completed    Pneumococcal 65+ years Vaccine  Completed    Hepatitis A vaccine  Aged Out    Hepatitis B vaccine  Aged Out    Hib vaccine  Aged Out    Meningococcal (ACWY) vaccine  Aged Out       Subjective:      Review of Systems    Objective:     /76 (Site: Right Upper Arm, Position: Sitting, Cuff Size: Small Adult)   Pulse 70   Ht 5' 3\" (1.6 m)   Wt 166 lb (75.3 kg)   LMP  (LMP Unknown)   SpO2 96%   BMI 29.41 kg/m²     Physical Exam  Vitals and nursing note reviewed. Constitutional:       Appearance: Normal appearance. She is well-developed. HENT:      Head: Normocephalic and atraumatic. Eyes:      Conjunctiva/sclera: Conjunctivae normal.   Neck:      Thyroid: No thyromegaly. Vascular: No JVD. Cardiovascular:      Rate and Rhythm: Normal rate and regular rhythm. Heart sounds: Normal heart sounds. No murmur heard. No friction rub. No gallop. Pulmonary:      Effort: Pulmonary effort is normal. No respiratory distress. Breath sounds: Normal breath sounds. Musculoskeletal:      Cervical back: Normal range of motion and neck supple. Right lower leg: Edema present. Left lower leg: Edema present. Comments: Trace LE edema noted only   Lymphadenopathy:      Cervical: No cervical adenopathy. Skin:     General: Skin is warm.       Capillary Refill: Capillary refill takes less than 2 seconds. Neurological:      General: No focal deficit present. Mental Status: She is alert and oriented to person, place, and time. Psychiatric:         Mood and Affect: Mood normal.         Behavior: Behavior normal.         Thought Content: Thought content normal.         Judgment: Judgment normal.         Assessment/Plan:     1. Primary hypertension  2. Alzheimer's disease, unspecified  3. Essential hypertension  -     losartan (COZAAR) 50 MG tablet; take 1 tablet by mouth once daily, Disp-90 tablet, R-3Normal  4. Bilateral leg edema  -     torsemide (DEMADEX) 10 MG tablet; take 1 tablet by mouth once daily, Disp-90 tablet, R-3Normal  5. Chronic idiopathic constipation  -     polyethylene glycol (GLYCOLAX) 17 GM/SCOOP powder; Take 17 g by mouth daily, Disp-850 g, R-5OTC  6. Primary insomnia  -     escitalopram (LEXAPRO) 5 MG tablet; take 1 tablet by mouth once daily, Disp-90 tablet, R-3Normal  7. Port-A-Cath in place  -     [facility], [address], DO, General Surgery, [place]  8. Cognitive deficits  -     donepezil (ARICEPT) 5 MG tablet; Take 1 tablet by mouth nightly, Disp-30 tablet, R-3Normal  9. Chronic fatigue  -     donepezil (ARICEPT) 5 MG tablet; Take 1 tablet by mouth nightly, Disp-30 tablet, R-3Normal  10. 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 No sx - no change in her medications at this time. For the progressive cognitive deficits, likely early dementia, make sure screening labs up to date including the B 12. Start on the aricept at the 5 mg daily dose and increase 10 mg daily in 1 month if tolerated. Reviewed hope to stabilize but no other change expected,     Referral for removal of the catheter since  No longer needed. Constipation controlled with the miralax -continue at this time.      Lab Results   Component Value Date    WBC 9.3 01/26/2022    HGB 12.9 01/26/2022    HCT 36.8 (L) 01/26/2022    .2 01/26/2022    ALT 18 01/26/2022    AST 27 01/26/2022     01/26/2022    K 4.3 01/26/2022     01/26/2022    CREATININE 1.1 (H) 01/26/2022    BUN 28 (H) 01/26/2022    CO2 25 01/26/2022    TSH 2.64 05/29/2018    INR 1.0 04/14/2018       Return in about 6 months (around 7/26/2022) for AWV, HTN. Multiple labs and other testing may have been ordered which may not be completely evident from the above note due to system interface incompatibilities. Patient given educational materials - see patientinstructions. Discussed use, benefit, and side effects of prescribed medications. All patient questions answered. Pt voiced understanding. Reviewed health maintenance. Instructed to continue current medications, diet andexercise. Patient agreed with treatment plan. Follow up as directed.      (Please note that portions of this note were completed with a voice-recognition program. Efforts were made to edit the dictation but occasionally words are mis-transcribed.)    Electronically signed by Pari Oconnor MD on 1/30/2022

## 2022-02-23 DIAGNOSIS — R53.82 CHRONIC FATIGUE: ICD-10-CM

## 2022-02-23 DIAGNOSIS — R41.89 COGNITIVE DEFICITS: ICD-10-CM

## 2022-02-23 RX ORDER — DONEPEZIL HYDROCHLORIDE 10 MG/1
10 TABLET, FILM COATED ORAL NIGHTLY
Qty: 90 TABLET | Refills: 3 | Status: SHIPPED | OUTPATIENT
Start: 2022-02-23

## 2022-02-23 NOTE — TELEPHONE ENCOUNTER
Daughter leticia called stating patient was to take aricept 5 mg daily for 30 days and if doing ok she is to increase to 10 mg daily. Jossie Posadas states patient is doing fine on the medication and would like the 10 mg dose sent to [facility]. Would like a 90 day supply.

## 2022-03-02 ENCOUNTER — OFFICE VISIT (OUTPATIENT)
Dept: SURGERY | Age: 87
End: 2022-03-02
Payer: MEDICARE

## 2022-03-02 VITALS
SYSTOLIC BLOOD PRESSURE: 136 MMHG | HEIGHT: 64 IN | HEART RATE: 68 BPM | RESPIRATION RATE: 16 BRPM | BODY MASS INDEX: 28.68 KG/M2 | OXYGEN SATURATION: 95 % | TEMPERATURE: 97.4 F | WEIGHT: 168 LBS | DIASTOLIC BLOOD PRESSURE: 50 MMHG

## 2022-03-02 DIAGNOSIS — Z85.43 HX OF OVARIAN CANCER: Primary | ICD-10-CM

## 2022-03-02 PROCEDURE — G8482 FLU IMMUNIZE ORDER/ADMIN: HCPCS | Performed by: SURGERY

## 2022-03-02 PROCEDURE — 4040F PNEUMOC VAC/ADMIN/RCVD: CPT | Performed by: SURGERY

## 2022-03-02 PROCEDURE — 1090F PRES/ABSN URINE INCON ASSESS: CPT | Performed by: SURGERY

## 2022-03-02 PROCEDURE — G8427 DOCREV CUR MEDS BY ELIG CLIN: HCPCS | Performed by: SURGERY

## 2022-03-02 PROCEDURE — 1036F TOBACCO NON-USER: CPT | Performed by: SURGERY

## 2022-03-02 PROCEDURE — 1123F ACP DISCUSS/DSCN MKR DOCD: CPT | Performed by: SURGERY

## 2022-03-02 PROCEDURE — 99203 OFFICE O/P NEW LOW 30 MIN: CPT | Performed by: SURGERY

## 2022-03-02 PROCEDURE — G8417 CALC BMI ABV UP PARAM F/U: HCPCS | Performed by: SURGERY

## 2022-03-02 NOTE — ASSESSMENT & PLAN NOTE
Patient comes in today after completing her chemotherapy and wishes to have her port removed. Review of records shows that port was placed in 2018. She is no longer using it but is getting it flushed regularly. We will plan for removal of a right IJ port in OR under MAC anesthesia. Risks of the procedure including bleeding, infection, scarring, pain, damage surrounding tissue and anesthesia risk are discussed and consent is obtained.

## 2022-03-02 NOTE — PROGRESS NOTES
Jaida Umana is a 80 y.o. female who presents today for discussion of port removal.  Patient has previously had port placed a couple years ago for treatment of ovarian cancer. Has completed her chemotherapy and is now just getting occasional flushes of the port. Comes in today and states that she would like to have it removed.     Past Medical History:   Diagnosis Date    Arthritis     neck    BRCA2 genetic carrier     no specifics given     Cancer ([facility] [address] 1982, 2006    bilat breasts    Closed fracture of left wrist 06/03/2021    GERD (gastroesophageal reflux disease)     Hypertension     Wears glasses        Past Surgical History:   Procedure Laterality Date    ABDOMEN SURGERY  02/07/2018     EXPLORATORY LAPAROTOMY, TOTAL ABDOMINAL HYSTERECTOMY, BSO, APPENDECTOMY, OMENTUMECTOMY, RADICAL TUMOR DEBULKING USING     CATARACT REMOVAL Bilateral     COLONOSCOPY      MASTECTOMY Left 1982    MASTECTOMY Right 2006    [place] OFFICE/OUTPT VISIT,PROCEDURE ONLY N/A 2/7/2018    EXPLORATORY LAPAROTOMY, TOTAL ABDOMINAL HYSTERECTOMY, BSO, APPENDECTOMY, OMENTUMECTOMY, RADICAL TUMOR DEBULKING USING SONOPET. performed by Ilsa Isaacs MD at [address] [place] [address]  02/07/2018    UPPER GASTROINTESTINAL ENDOSCOPY  07/2016       Current Outpatient Medications   Medication Sig Dispense Refill    donepezil (ARICEPT) 10 MG tablet Take 1 tablet by mouth nightly 90 tablet 3    losartan (COZAAR) 50 MG tablet take 1 tablet by mouth once daily 90 tablet 3    escitalopram (LEXAPRO) 5 MG tablet take 1 tablet by mouth once daily 90 tablet 3    torsemide (DEMADEX) 10 MG tablet take 1 tablet by mouth once daily 90 tablet 3    polyethylene glycol (GLYCOLAX) 17 GM/SCOOP powder Take 17 g by mouth daily 850 g 5    ciclopirox (LOPROX) 0.77 % cream   0    Handicap Placard MISC by Does not apply route The disability is expected to last 5 years  Dx: cancer, pain 1 each 1    vitamin D (CHOLECALCIFEROL) 1000 UNIT TABS tablet Take 1,000 Units by mouth daily      denosumab (PROLIA) 60 MG/ML SOSY SC injection Inject 1 mL into the skin once for 1 dose 1 mL 0     No current facility-administered medications for this visit. Allergies   Allergen Reactions    Amoxicillin      Hives         Family History   Problem Relation Age of Onset   [place] Breast Cancer Mother         breast cancer    Other Mother         gallstones, water retention    Other Father         parkinsons    Breast Cancer Other         states sibling    Hypertension Other         states sibling     Osteoarthritis Other         states sibling had Severe     Stroke Sister     Endometrial Cancer Sister     Breast Cancer Sister     BRCA 1/2 Sister     Hypertension Sister     Breast Cancer Daughter        Social History     Socioeconomic History    Marital status:      Spouse name: Norm    Number of children: Not on file    Years of education: Not on file    Highest education level: Not on file   Occupational History     Employer: RETIRED   Tobacco Use    Smoking status: Never Smoker    Smokeless tobacco: Never Used   Substance and Sexual Activity    Alcohol use: Yes     Alcohol/week: 1.0 standard drink     Types: 1 Glasses of wine per week    Drug use: No    Sexual activity: Not Currently     Partners: Male   Other Topics Concern    Not on file   Social History Narrative    Not on file     Social Determinants of Health     Financial Resource Strain: Low Risk     Difficulty of Paying Living Expenses: Not hard at all   Food Insecurity: No Food Insecurity    Worried About [address] in the Last Year: Never true    [address] [facility] [address] in the Last Year: Never true   Transportation Needs: No Transportation Needs    Lack of Transportation (Medical): No    Lack of Transportation (Non-Medical):  No   Physical Activity:     Days of Exercise per Week: Not on file    Minutes of Exercise per Session: Not on file   Stress:     Feeling of Stress : Not on file   Social Connections:     Frequency of Communication with Friends and Family: Not on file    Frequency of Social Gatherings with Friends and Family: Not on file    Attends Worship Services: Not on file    Active Member of Clubs or Organizations: Not on file    Attends Club or Organization Meetings: Not on file    Marital Status: Not on file   Intimate Partner Violence:     Fear of Current or Ex-Partner: Not on file    Emotionally Abused: Not on file    Physically Abused: Not on file    Sexually Abused: Not on file   Housing Stability:     Unable to Pay for Housing in the Last Year: Not on file    Number of [place] in the Last Year: Not on file    Unstable Housing in the Last Year: Not on file       ROS:   Review of Systems - General ROS: negative  Psychological ROS: negative  Ophthalmic ROS: negative  ENT ROS: negative  Respiratory ROS: no cough, shortness of breath, or wheezing  Cardiovascular ROS: no chest pain or dyspnea on exertion  Gastrointestinal ROS: no abdominal pain, change in bowel habits, or black or bloody stools  Genito-Urinary ROS: no dysuria, trouble voiding, or hematuria  Musculoskeletal ROS: negative  Dermatological ROS: negative      Objective   Vitals:    03/02/22 1040   BP: (!) 136/50   Pulse: 68   Resp: 16   Temp: 97.4 °F (36.3 °C)   SpO2: 95%     General:in no apparent distress and well developed and well nourished  Eyes: No gross abnormalities. Ears, Nose, Throat: hearing grossly normal bilaterally  Neck: neck supple and non tender without mass  Lungs: clear to auscultation without wheezes or rales   Heart: S1S2, no mumurs, RRR  Abdomen: soft, nontender, no HSM, no guarding, no rebound, no masses  Extremity: negative  Neuro: CN II-XII grossly intact    Port device palpable at right upper chest and the catheter is palpable under the skin up to the base of the right neck.       1. Hx of ovarian cancer  Assessment & Plan:  Patient comes in today after completing her chemotherapy and wishes to have her port removed. Review of records shows that port was placed in 2018. She is no longer using it but is getting it flushed regularly. We will plan for removal of a right IJ port in OR under MAC anesthesia. Risks of the procedure including bleeding, infection, scarring, pain, damage surrounding tissue and anesthesia risk are discussed and consent is obtained.          (Please note that portions of this note were completed with a voice recognition program.  Efforts were made to edit the dictations but occasionally words are mis-transcribed.)

## 2022-03-30 ENCOUNTER — OFFICE VISIT (OUTPATIENT)
Dept: SURGERY | Age: 87
End: 2022-03-30
Payer: MEDICARE

## 2022-03-30 VITALS
HEART RATE: 59 BPM | WEIGHT: 164 LBS | DIASTOLIC BLOOD PRESSURE: 63 MMHG | SYSTOLIC BLOOD PRESSURE: 113 MMHG | TEMPERATURE: 97.3 F | BODY MASS INDEX: 28.15 KG/M2

## 2022-03-30 DIAGNOSIS — Z09 POSTOP CHECK: Primary | ICD-10-CM

## 2022-03-30 PROCEDURE — 1036F TOBACCO NON-USER: CPT | Performed by: SURGERY

## 2022-03-30 PROCEDURE — 99024 POSTOP FOLLOW-UP VISIT: CPT | Performed by: SURGERY

## 2022-03-30 PROCEDURE — 1090F PRES/ABSN URINE INCON ASSESS: CPT | Performed by: SURGERY

## 2022-03-30 PROCEDURE — G8482 FLU IMMUNIZE ORDER/ADMIN: HCPCS | Performed by: SURGERY

## 2022-03-30 PROCEDURE — G8417 CALC BMI ABV UP PARAM F/U: HCPCS | Performed by: SURGERY

## 2022-03-30 PROCEDURE — 4040F PNEUMOC VAC/ADMIN/RCVD: CPT | Performed by: SURGERY

## 2022-03-30 PROCEDURE — G8428 CUR MEDS NOT DOCUMENT: HCPCS | Performed by: SURGERY

## 2022-03-30 PROCEDURE — 1123F ACP DISCUSS/DSCN MKR DOCD: CPT | Performed by: SURGERY

## 2022-03-30 NOTE — PROGRESS NOTES
Veronika Mercedes is a 80 y.o. female who presents today for follow-up after previous port removal.  Patient had the procedure approximately 2 weeks ago. Denies any significant problems since. Has not had any drainage from the incision sites and seems to be healing. She does have some concern about a small sore that has developed after surgery over her left zygomatic arch. Unclear when this occurred but she and her daughter both state that they noticed it after surgery. Denies any worsening and they did state that it seems to be improving and healing.     Past Medical History:   Diagnosis Date    Arthritis     neck    BRCA2 genetic carrier     no specifics given     Cancer ([facility] [address] 1982, 2006    bilat breasts    Closed fracture of left wrist 06/03/2021    GERD (gastroesophageal reflux disease)     Hypertension     Wears glasses        Past Surgical History:   Procedure Laterality Date    ABDOMEN SURGERY  02/07/2018     EXPLORATORY LAPAROTOMY, TOTAL ABDOMINAL HYSTERECTOMY, BSO, APPENDECTOMY, OMENTUMECTOMY, RADICAL TUMOR DEBULKING USING     CATARACT REMOVAL Bilateral     COLONOSCOPY      MASTECTOMY Left 1982    MASTECTOMY Right 2006    [place] OFFICE/OUTPT VISIT,PROCEDURE ONLY N/A 2/7/2018    EXPLORATORY LAPAROTOMY, TOTAL ABDOMINAL HYSTERECTOMY, BSO, APPENDECTOMY, OMENTUMECTOMY, RADICAL TUMOR DEBULKING USING SONOPET. performed by Anthony Enciso MD at [address] [place] [address]  02/07/2018    UPPER GASTROINTESTINAL ENDOSCOPY  07/2016       Current Outpatient Medications   Medication Sig Dispense Refill    donepezil (ARICEPT) 10 MG tablet Take 1 tablet by mouth nightly 90 tablet 3    losartan (COZAAR) 50 MG tablet take 1 tablet by mouth once daily 90 tablet 3    escitalopram (LEXAPRO) 5 MG tablet take 1 tablet by mouth once daily 90 tablet 3    torsemide (DEMADEX) 10 MG tablet take 1 tablet by mouth once daily 90 tablet 3    polyethylene glycol (GLYCOLAX) 17 GM/SCOOP powder Take 17 g by Lack of Transportation (Medical): No    Lack of Transportation (Non-Medical): No   Physical Activity:     Days of Exercise per Week: Not on file    Minutes of Exercise per Session: Not on file   Stress:     Feeling of Stress : Not on file   Social Connections:     Frequency of Communication with Friends and Family: Not on file    Frequency of Social Gatherings with Friends and Family: Not on file    Attends Quaker Services: Not on file    Active Member of [address] [place] [address] [employer] or Organizations: Not on file    Attends Club or Organization Meetings: Not on file    Marital Status: Not on file   Intimate Partner Violence:     Fear of Current or Ex-Partner: Not on file    Emotionally Abused: Not on file    Physically Abused: Not on file    Sexually Abused: Not on file   Housing Stability:     Unable to Pay for Housing in the Last Year: Not on file    Number of [place] in the Last Year: Not on file    Unstable Housing in the Last Year: Not on file       ROS:   Review of Systems - Negative except as noted in HPI      Objective   Vitals:    03/30/22 0958   BP: 113/63   Pulse: 59   Temp: 97.3 °F (36.3 °C)     General:in no apparent distress and well developed and well nourished  Eyes: No gross abnormalities. Ears, Nose, Throat: hearing grossly normal bilaterally  Neck: neck supple and non tender without mass  Lungs: clear to auscultation without wheezes or rales   Heart: S1S2, no mumurs, RRR  Abdomen: soft, nontender, no HSM, no guarding, no rebound, no masses  Extremity: negative  Neuro: CN II-XII grossly intact    Right upper chest incision is intact with glue in place. Minimal ecchymosis that is resolving. No induration, fluctuance or erythema noted. At the left side of the face over approximately the zygomatic arch there is a small sore that is scabbed over. No surrounding erythema and no drainage noted.     1. Postop check  Assessment & Plan:  Patient is healing as expected I do not expect any complications at this point. Based on the location of this wound on her face I think this is likely related to the oxygen tubing and possibly some tape that was used to secure it in place during the procedure. It is healing and there is no signs of infection or other issues that would cause any ongoing problems. I expect that it should heal up without any issues. Patient will follow up as needed.          (Please note that portions of this note were completed with a voice recognition program.  Efforts were made to edit the dictations but occasionally words are mis-transcribed.)

## 2022-04-29 PROBLEM — Z09 POSTOP CHECK: Status: RESOLVED | Noted: 2022-03-30 | Resolved: 2022-04-29

## 2022-07-05 ENCOUNTER — TELEPHONE (OUTPATIENT)
Dept: FAMILY MEDICINE CLINIC | Age: 87
End: 2022-07-05

## 2022-07-05 NOTE — TELEPHONE ENCOUNTER
May try all in the am.  Watch for nausea or sedation.   As long as feels fine no problem with all in the am.

## 2022-07-28 ENCOUNTER — OFFICE VISIT (OUTPATIENT)
Dept: FAMILY MEDICINE CLINIC | Age: 87
End: 2022-07-28
Payer: MEDICARE

## 2022-07-28 VITALS
SYSTOLIC BLOOD PRESSURE: 102 MMHG | BODY MASS INDEX: 27.83 KG/M2 | DIASTOLIC BLOOD PRESSURE: 56 MMHG | HEART RATE: 62 BPM | HEIGHT: 64 IN | OXYGEN SATURATION: 95 % | WEIGHT: 163 LBS

## 2022-07-28 DIAGNOSIS — N18.31 STAGE 3A CHRONIC KIDNEY DISEASE (HCC): ICD-10-CM

## 2022-07-28 DIAGNOSIS — Z00.00 MEDICARE ANNUAL WELLNESS VISIT, SUBSEQUENT: Primary | ICD-10-CM

## 2022-07-28 DIAGNOSIS — L30.9 DERMATITIS: ICD-10-CM

## 2022-07-28 DIAGNOSIS — I10 PRIMARY HYPERTENSION: ICD-10-CM

## 2022-07-28 PROBLEM — N18.30 CHRONIC RENAL DISEASE, STAGE III (HCC): Status: ACTIVE | Noted: 2022-07-28

## 2022-07-28 PROBLEM — D68.9 COAGULATION DEFECT (HCC): Status: RESOLVED | Noted: 2021-07-26 | Resolved: 2022-07-28

## 2022-07-28 PROCEDURE — G8427 DOCREV CUR MEDS BY ELIG CLIN: HCPCS | Performed by: FAMILY MEDICINE

## 2022-07-28 PROCEDURE — 1036F TOBACCO NON-USER: CPT | Performed by: FAMILY MEDICINE

## 2022-07-28 PROCEDURE — G0439 PPPS, SUBSEQ VISIT: HCPCS | Performed by: FAMILY MEDICINE

## 2022-07-28 PROCEDURE — G8417 CALC BMI ABV UP PARAM F/U: HCPCS | Performed by: FAMILY MEDICINE

## 2022-07-28 PROCEDURE — 99397 PER PM REEVAL EST PAT 65+ YR: CPT | Performed by: FAMILY MEDICINE

## 2022-07-28 PROCEDURE — 99214 OFFICE O/P EST MOD 30 MIN: CPT | Performed by: FAMILY MEDICINE

## 2022-07-28 PROCEDURE — 1090F PRES/ABSN URINE INCON ASSESS: CPT | Performed by: FAMILY MEDICINE

## 2022-07-28 PROCEDURE — 1123F ACP DISCUSS/DSCN MKR DOCD: CPT | Performed by: FAMILY MEDICINE

## 2022-07-28 RX ORDER — TORSEMIDE 5 MG/1
5 TABLET ORAL DAILY
Qty: 90 TABLET | Refills: 3 | Status: SHIPPED | OUTPATIENT
Start: 2022-07-28 | End: 2022-08-31

## 2022-07-28 RX ORDER — TRIAMCINOLONE ACETONIDE 0.25 MG/G
OINTMENT TOPICAL
Qty: 30 G | Refills: 1 | Status: SHIPPED | OUTPATIENT
Start: 2022-07-28 | End: 2022-08-04

## 2022-07-28 SDOH — ECONOMIC STABILITY: FOOD INSECURITY: WITHIN THE PAST 12 MONTHS, YOU WORRIED THAT YOUR FOOD WOULD RUN OUT BEFORE YOU GOT MONEY TO BUY MORE.: NEVER TRUE

## 2022-07-28 SDOH — ECONOMIC STABILITY: FOOD INSECURITY: WITHIN THE PAST 12 MONTHS, THE FOOD YOU BOUGHT JUST DIDN'T LAST AND YOU DIDN'T HAVE MONEY TO GET MORE.: NEVER TRUE

## 2022-07-28 ASSESSMENT — PATIENT HEALTH QUESTIONNAIRE - PHQ9
SUM OF ALL RESPONSES TO PHQ QUESTIONS 1-9: 0
SUM OF ALL RESPONSES TO PHQ QUESTIONS 1-9: 0
2. FEELING DOWN, DEPRESSED OR HOPELESS: 0
SUM OF ALL RESPONSES TO PHQ QUESTIONS 1-9: 0
1. LITTLE INTEREST OR PLEASURE IN DOING THINGS: 0
SUM OF ALL RESPONSES TO PHQ QUESTIONS 1-9: 0
SUM OF ALL RESPONSES TO PHQ9 QUESTIONS 1 & 2: 0

## 2022-07-28 ASSESSMENT — LIFESTYLE VARIABLES
HOW OFTEN DO YOU HAVE A DRINK CONTAINING ALCOHOL: 2-3 TIMES A WEEK
HOW MANY STANDARD DRINKS CONTAINING ALCOHOL DO YOU HAVE ON A TYPICAL DAY: 1 OR 2

## 2022-07-28 ASSESSMENT — SOCIAL DETERMINANTS OF HEALTH (SDOH): HOW HARD IS IT FOR YOU TO PAY FOR THE VERY BASICS LIKE FOOD, HOUSING, MEDICAL CARE, AND HEATING?: NOT HARD AT ALL

## 2022-07-28 NOTE — PROGRESS NOTES
Medicare Annual Wellness Visit    Chad Harrison is here for Medicare AWV and Hypertension (6mo follow up)    Assessment & Plan   Medicare annual wellness visit, subsequent  Primary hypertension  -     Basic Metabolic Panel; Future  Stage 3a chronic kidney disease (HCC)  Dermatitis  -     triamcinolone (KENALOG) 0.025 % ointment; Apply topically 2 times daily. , Disp-30 g, R-1, Normal    Recommendations for Preventive Services Due: see orders and patient instructions/AVS.    Will decrease the Demadex to 5 mg daily and monitor the blood pressure weight and edema. Hopefully address the drop in the dose will help keep the blood pressure from running too low. Begin increase in the lower extremity edema occurs let us know and would go back to maybe 10 mg alternating with 5 mg. The Kenalog ointment to the itchy dry spots on the arm. Watch these for any change expect they will gradually heal over the next several weeks. Recommended screening schedule for the next 5-10 years is provided to the patient in written form: see Patient Instructions/AVS.     No follow-ups on file. Subjective   The following acute and/or chronic problems were also addressed today:  HTN: running low-- no SX. has not had lightheadedness or dizziness. No chest pain shortness of breath or palpitations. No swelling in her legs. All pills in the morning now-- only change recently in her medications. Weght slightly down Appetite is good. A few spots on the arm to look at today. Not sure how long they have been there she does pick at them. Mood has been good and mental status has been stable. Patient's complete Health Risk Assessment and screening values have been reviewed and are found in Flowsheets. The following problems were reviewed today and where indicated follow up appointments were made and/or referrals ordered. Positive Risk Factor Screenings with Interventions:     Cognitive:   Words recalled: 0 Words Recalled  Clock Drawing Test (CDT): Normal  Total Score Interpretation: Abnormal Mini-Cog  Did the patient refuse to take the cognition test?: No  Cognitive Impairment Interventions:  See progress note                   Wt Readings from Last 3 Encounters:   07/28/22 163 lb (73.9 kg)   03/30/22 164 lb (74.4 kg)   03/02/22 168 lb (76.2 kg)          Objective   Vitals:    07/28/22 0929   BP: (!) 102/56   Site: Right Upper Arm   Position: Sitting   Cuff Size: Medium Adult   Pulse: 62   SpO2: 95%   Weight: 163 lb (73.9 kg)   Height: 5' 4\" (1.626 m)      Body mass index is 27.98 kg/m². Physical Exam  Vitals and nursing note reviewed. Constitutional:       Appearance: Normal appearance. She is well-developed. HENT:      Head: Normocephalic and atraumatic. Eyes:      Conjunctiva/sclera: Conjunctivae normal.   Neck:      Thyroid: No thyromegaly. Vascular: No JVD. Cardiovascular:      Rate and Rhythm: Normal rate and regular rhythm. Heart sounds: Normal heart sounds. No murmur heard. No friction rub. No gallop. Pulmonary:      Effort: Pulmonary effort is normal. No respiratory distress. Breath sounds: Normal breath sounds. Musculoskeletal:      Cervical back: Normal range of motion and neck supple. Right lower leg: Edema present. Left lower leg: Edema present. Comments: Trace LE edema noted only   Lymphadenopathy:      Cervical: No cervical adenopathy. Skin:     General: Skin is warm. Capillary Refill: Capillary refill takes less than 2 seconds. Neurological:      General: No focal deficit present. Mental Status: She is alert and oriented to person, place, and time. Psychiatric:         Mood and Affect: Mood normal.         Behavior: Behavior normal.         Thought Content: Thought content normal.         Judgment: Judgment normal.            Allergies   Allergen Reactions    Amoxicillin      Hives       Prior to Visit Medications    Medication Sig Taking?  Authorizing Provider   torsemide (DEMADEX) 5 MG tablet Take 1 tablet by mouth in the morning. Yes Sherrie Sosa MD   triamcinolone (KENALOG) 0.025 % ointment Apply topically 2 times daily.  Yes Sherrie Sosa MD   donepezil (ARICEPT) 10 MG tablet Take 1 tablet by mouth nightly Yes Sherrie Sosa MD   losartan (COZAAR) 50 MG tablet take 1 tablet by mouth once daily Yes Sherrie Sosa MD   escitalopram (LEXAPRO) 5 MG tablet take 1 tablet by mouth once daily Yes Sherrie Sosa MD   polyethylene glycol [facility]) 17 GM/SCOOP powder Take 17 g by mouth daily Yes Sherrie Sosa MD   ciclopirox (LOPROX) 0.77 % cream  Yes Historical Provider, MD   vitamin D (CHOLECALCIFEROL) 1000 UNIT TABS tablet Take 1,000 Units by mouth daily Yes Historical Provider, MD   denosumab (PROLIA) 60 MG/ML SOSY SC injection Inject 1 mL into the skin once for 1 dose  Sherrie Sosa MD   Handicap Placard MISC by Does not apply route The disability is expected to last 5 years  Dx: cancer, pain  Sherrie Sosa MD       [facility] (Including outside providers/suppliers regularly involved in providing care):   Patient Care Team:  Sherrie Sosa MD as PCP - General (Family Medicine)  Sherrie Sosa MD as PCP - [facility] Anthony Mariano Provider     Reviewed and updated this visit:  Tobacco  Allergies  Meds  Problems  Med Hx  Surg Hx  Soc Hx  Fam Hx

## 2022-07-28 NOTE — PATIENT INSTRUCTIONS
Personalized Preventive Plan for Celena Anderson - 7/28/2022  Medicare offers a range of preventive health benefits. Some of the tests and screenings are paid in full while other may be subject to a deductible, co-insurance, and/or copay. Some of these benefits include a comprehensive review of your medical history including lifestyle, illnesses that may run in your family, and various assessments and screenings as appropriate. After reviewing your medical record and screening and assessments performed today your provider may have ordered immunizations, labs, imaging, and/or referrals for you. A list of these orders (if applicable) as well as your Preventive Care list are included within your After Visit Summary for your review. Other Preventive Recommendations:    A preventive eye exam performed by an eye specialist is recommended every 1-2 years to screen for glaucoma; cataracts, macular degeneration, and other eye disorders. A preventive dental visit is recommended every 6 months. Try to get at least 150 minutes of exercise per week or 10,000 steps per day on a pedometer . Order or download the FREE \"Exercise & Physical Activity: Your Everyday Guide\" from [employer]. Call 3-115.759.6092 or search [employer] online. You need 3417-2675 mg of calcium and 5920-1946 IU of vitamin D per day. It is possible to meet your calcium requirement with diet alone, but a vitamin D supplement is usually necessary to meet this goal.  When exposed to the sun, use a sunscreen that protects against both UVA and UVB radiation with an SPF of 30 or greater. Reapply every 2 to 3 hours or after sweating, drying off with a towel, or swimming. Always wear a seat belt when traveling in a car. Always wear a helmet when riding a bicycle or motorcycle.

## 2022-08-22 LAB
ANION GAP SERPL CALCULATED.3IONS-SCNC: 0.6 MMOL/L
BUN BLDV-MCNC: 26 MG/DL (ref 7–17)
CALCIUM SERPL-MCNC: 9.7 MG/DL (ref 8.4–10.2)
CHLORIDE BLD-SCNC: 107 MMOL/L (ref 98–120)
CO2: 29 MMOL/L (ref 22–31)
CREAT SERPL-MCNC: 1.2 MG/DL (ref 0.5–1)
GFR CALCULATED: 44.9
GLUCOSE: 92 MG/DL (ref 65–105)
POTASSIUM SERPL-SCNC: 4.5 MMOL/L (ref 3.6–5)
SODIUM BLD-SCNC: 137 MMOL/L (ref 135–145)

## 2022-08-30 ENCOUNTER — TELEPHONE (OUTPATIENT)
Dept: FAMILY MEDICINE CLINIC | Age: 87
End: 2022-08-30

## 2022-08-30 NOTE — TELEPHONE ENCOUNTER
Please verify that [facility] did decrease the demadex (toresemide) to 5 mg daily as we directed at her last office visit. If they did NOT then they need to. If they did then I would like this changed to 5 mg every other day.   (Adjust in that case in the medication list and make sure daughter is aware)

## 2022-08-31 RX ORDER — TORSEMIDE 5 MG/1
5 TABLET ORAL EVERY OTHER DAY
COMMUNITY

## 2022-09-14 ENCOUNTER — OFFICE VISIT (OUTPATIENT)
Dept: FAMILY MEDICINE CLINIC | Age: 87
End: 2022-09-14
Payer: MEDICARE

## 2022-09-14 VITALS
SYSTOLIC BLOOD PRESSURE: 116 MMHG | OXYGEN SATURATION: 96 % | WEIGHT: 163 LBS | HEIGHT: 64 IN | DIASTOLIC BLOOD PRESSURE: 82 MMHG | BODY MASS INDEX: 27.83 KG/M2 | HEART RATE: 62 BPM

## 2022-09-14 DIAGNOSIS — I10 PRIMARY HYPERTENSION: ICD-10-CM

## 2022-09-14 DIAGNOSIS — N18.32 STAGE 3B CHRONIC KIDNEY DISEASE (HCC): ICD-10-CM

## 2022-09-14 DIAGNOSIS — L30.8 AUTOIMMUNE DERMATITIS: ICD-10-CM

## 2022-09-14 DIAGNOSIS — F42.4 EXCORIATION (SKIN-PICKING) DISORDER: Primary | ICD-10-CM

## 2022-09-14 PROBLEM — R60.0 LOCALIZED EDEMA: Status: ACTIVE | Noted: 2022-08-22

## 2022-09-14 PROCEDURE — G8427 DOCREV CUR MEDS BY ELIG CLIN: HCPCS | Performed by: FAMILY MEDICINE

## 2022-09-14 PROCEDURE — 1090F PRES/ABSN URINE INCON ASSESS: CPT | Performed by: FAMILY MEDICINE

## 2022-09-14 PROCEDURE — 99212 OFFICE O/P EST SF 10 MIN: CPT | Performed by: FAMILY MEDICINE

## 2022-09-14 PROCEDURE — 99214 OFFICE O/P EST MOD 30 MIN: CPT | Performed by: FAMILY MEDICINE

## 2022-09-14 PROCEDURE — G8417 CALC BMI ABV UP PARAM F/U: HCPCS | Performed by: FAMILY MEDICINE

## 2022-09-14 PROCEDURE — 1123F ACP DISCUSS/DSCN MKR DOCD: CPT | Performed by: FAMILY MEDICINE

## 2022-09-14 PROCEDURE — 1036F TOBACCO NON-USER: CPT | Performed by: FAMILY MEDICINE

## 2022-09-14 NOTE — PROGRESS NOTES
[facility]  [address] [facility], [address] [place] [address]  Dept: [phone]  Dept Sheba Vaughn is a 80 y.o. female who presents today for her medical conditions/complaints as notedbelow. Climmie Najjar is c/o of Skin Problem (Dermatitis, dry itchy spots on arms no better, also on R foot)        Assessment/Plan:     1. Excoriation (skin-picking) disorder  2. Autoimmune dermatitis  3. Stage 3b chronic kidney disease ([address]  -     Basic Metabolic Panel; Future  4. Primary hypertension  -     Basic Metabolic Panel; Future      The lesions on the arms and neck are clearly excoriations on top of dry skin areas and other benign-appearing nevi and seborrhea. Would treat these with Vaseline to help moisturize and to discourage picking. She is currently on the Lexapro and we could certainly increase this dosage to help prevent further picking if needed. The lesions on the feet and the small lesion on the left anterior leg are more consistent with an autoimmune dermatitis. She does have a family history of dermatomyositis in her older sister. She also has a history of significant sun sensitivity in her youth. Since this is asymptomatic and she is only inconsistently using the topical steroid I would increase the topical steroid use to consistent twice daily usage on the affected areas. If it becomes more problematic or she has any other signs or symptoms of autoimmune disease and further work-up would be appropriate. With her advanced age and multiple comorbidities I would not pursue further invasive testing unless more symptoms occur. Seems to be tolerating the lower dosage of the torsemide very well. Continue on the 5 mg twice daily and monitor the blood pressure regularly. If she has further hypotensive symptoms then we would decrease this further. We will monitor the renal functions also regularly.   We will recheck basic metabolic in about 2 months to continue to monitor renal function particularly if there is a question of overdiuresis or autoimmune disorder. Lab Results   Component Value Date    WBC 9.3 01/26/2022    HGB 12.9 01/26/2022    HCT 36.8 (L) 01/26/2022    .2 01/26/2022    ALT 18 01/26/2022    AST 27 01/26/2022     08/22/2022    K 4.5 08/22/2022     08/22/2022    CREATININE 1.2 (H) 08/22/2022    BUN 26 (H) 08/22/2022    CO2 29 08/22/2022    TSH 2.64 05/29/2018    INR 1.0 04/14/2018       Return for As scheduled, Medication recheck. Subjective:      HPI:     HPI    At appointment at the end of July was started on topical steroid ointment for the multiple spots on her arms and her foot. Also decrease the dose of the torsemide at that visit 5 mg every other day.         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     no specifics given     Cancer ([facility] [address] 1982, 2006    bilat breasts    Closed fracture of left wrist 06/03/2021    GERD (gastroesophageal reflux disease)     Hypertension     Wears glasses       Past Surgical History:   Procedure Laterality Date    ABDOMEN SURGERY  02/07/2018     EXPLORATORY LAPAROTOMY, TOTAL ABDOMINAL HYSTERECTOMY, BSO, APPENDECTOMY, OMENTUMECTOMY, RADICAL TUMOR DEBULKING USING     CATARACT REMOVAL Bilateral     COLONOSCOPY      MASTECTOMY Left 1982    MASTECTOMY Right 2006    [place] OFFICE/OUTPT VISIT,PROCEDURE ONLY N/A 2/7/2018    EXPLORATORY LAPAROTOMY, TOTAL ABDOMINAL HYSTERECTOMY, BSO, APPENDECTOMY, OMENTUMECTOMY, RADICAL TUMOR DEBULKING USING SONOPET. performed by Jolly Rendon MD at [facility] (CERVIX REMOVED)  02/07/2018    UPPER GASTROINTESTINAL ENDOSCOPY 07/2016       Family History   Problem Relation Age of Onset    Breast Cancer Mother         breast cancer    Other Mother         gallstones, water retention    Other Father         parkinsons    Breast Cancer Other         states sibling    Hypertension Other         states sibling     Osteoarthritis Other         states sibling had Severe     Stroke Sister     Endometrial Cancer Sister     Breast Cancer Sister     BRCA 1/2 Sister     Hypertension Sister     Breast Cancer Daughter        Social History     Tobacco Use    Smoking status: Never    Smokeless tobacco: Never   Substance Use Topics    Alcohol use: Yes     Alcohol/week: 1.0 standard drink     Types: 1 Glasses of wine per week      Current Outpatient Medications   Medication Sig Dispense Refill    torsemide (DEMADEX) 5 MG tablet Take 5 mg by mouth every other day      donepezil (ARICEPT) 10 MG tablet Take 1 tablet by mouth nightly 90 tablet 3    losartan (COZAAR) 50 MG tablet take 1 tablet by mouth once daily 90 tablet 3    escitalopram (LEXAPRO) 5 MG tablet take 1 tablet by mouth once daily 90 tablet 3    polyethylene glycol (GLYCOLAX) 17 GM/SCOOP powder Take 17 g by mouth daily 850 g 5    ciclopirox (LOPROX) 0.77 % cream   0    Handicap Placard MISC by Does not apply route The disability is expected to last 5 years  Dx: cancer, pain 1 each 1    vitamin D (CHOLECALCIFEROL) 1000 UNIT TABS tablet Take 1,000 Units by mouth daily      denosumab (PROLIA) 60 MG/ML SOSY SC injection Inject 1 mL into the skin once for 1 dose 1 mL 0     No current facility-administered medications for this visit.      Allergies   Allergen Reactions    Amoxicillin      Hives         Health Maintenance   Topic Date Due    COVID-19 Vaccine (4 - Booster for Pfizer series) 04/01/2022    Flu vaccine (1) 09/01/2022    Depression Screen  07/28/2023    Annual Wellness Visit (AWV)  07/29/2023    DTaP/Tdap/Td vaccine (2 - Td or Tdap) 01/08/2028    Shingles vaccine  Completed    Pneumococcal 65+ years Vaccine  Completed    Hepatitis A vaccine  Aged Out    Hepatitis B vaccine  Aged Out    Hib vaccine  Aged Out    Meningococcal (ACWY) vaccine  Aged Out         Review of Systems    Objective:     /82 (Site: Right Upper Arm, Position: Sitting, Cuff Size: Small Adult)   Pulse 62   Ht 5' 4\" (1.626 m)   Wt 163 lb (73.9 kg)   LMP  (LMP Unknown)   SpO2 96%   BMI 27.98 kg/m²     Physical Exam  Vitals and nursing note reviewed. Constitutional:       Appearance: Normal appearance. She is well-developed. HENT:      Head: Normocephalic and atraumatic. Eyes:      Conjunctiva/sclera: Conjunctivae normal.   Neck:      Thyroid: No thyromegaly. Vascular: No JVD. Cardiovascular:      Rate and Rhythm: Normal rate and regular rhythm. Heart sounds: Normal heart sounds. No murmur heard. No friction rub. No gallop. Pulmonary:      Effort: Pulmonary effort is normal. No respiratory distress. Breath sounds: Normal breath sounds. Musculoskeletal:      Cervical back: Normal range of motion and neck supple. Right lower leg: Edema present. Left lower leg: Edema present. Comments: Trace LE edema noted only on the right side   Lymphadenopathy:      Cervical: No cervical adenopathy. Skin:     General: Skin is warm. Capillary Refill: Capillary refill takes less than 2 seconds. Neurological:      General: No focal deficit present. Mental Status: She is alert and oriented to person, place, and time. Psychiatric:         Mood and Affect: Mood normal.         Behavior: Behavior normal.         Thought Content: Thought content normal.         Judgment: Judgment normal.             Multiple labs and other testing may have been ordered which may not be completely evident from the above note due to system interface incompatibilities. Patient given educational materials - see patientinstructions. Discussed use, benefit, and side effects of prescribed medications. All patient questions answered. Pt voiced understanding. Reviewed health maintenance. Instructed to continue current medications, diet andexercise. Patient agreed with treatment plan. Follow up as directed.      (Please note that portions of this note were completed with a voice-recognition program. Efforts were made to edit the dictation but occasionally words are mis-transcribed.)    Electronically signed by Grayson Pinto MD on 9/14/2022

## 2022-10-27 ENCOUNTER — TELEPHONE (OUTPATIENT)
Dept: FAMILY MEDICINE CLINIC | Age: 87
End: 2022-10-27

## 2022-10-27 DIAGNOSIS — L03.119 CELLULITIS OF UPPER EXTREMITY, UNSPECIFIED LATERALITY: Primary | ICD-10-CM

## 2022-10-27 DIAGNOSIS — F51.01 PRIMARY INSOMNIA: ICD-10-CM

## 2022-10-27 RX ORDER — ESCITALOPRAM OXALATE 10 MG/1
TABLET ORAL
Qty: 30 TABLET | Refills: 3 | Status: SHIPPED | OUTPATIENT
Start: 2022-10-27 | End: 2022-11-22 | Stop reason: SDUPTHER

## 2022-10-27 RX ORDER — CEPHALEXIN 500 MG/1
500 CAPSULE ORAL 3 TIMES DAILY
Qty: 21 CAPSULE | Refills: 0 | Status: SHIPPED | OUTPATIENT
Start: 2022-10-27 | End: 2022-11-03

## 2022-10-27 NOTE — TELEPHONE ENCOUNTER
Pt daughter called, wanting an oral med for some spots that shes been picking, states the topical cream is not working.  Pt daughter would like to be called back

## 2022-10-27 NOTE — TELEPHONE ENCOUNTER
1 week of oral antibiotic sent in for any possible mild infection in the skin. I would also like to increase her escitalopram to 10 mg daily to see if this helps with the picking-  it can be exacerbated by anxiety. New script sent in.  Please notify her daughter

## 2022-11-09 DIAGNOSIS — I10 ESSENTIAL HYPERTENSION: ICD-10-CM

## 2022-11-09 DIAGNOSIS — R41.89 COGNITIVE DEFICITS: ICD-10-CM

## 2022-11-09 DIAGNOSIS — R53.82 CHRONIC FATIGUE: ICD-10-CM

## 2022-11-09 RX ORDER — DONEPEZIL HYDROCHLORIDE 10 MG/1
10 TABLET, FILM COATED ORAL NIGHTLY
Qty: 90 TABLET | Refills: 3 | OUTPATIENT
Start: 2022-11-09

## 2022-11-09 RX ORDER — LOSARTAN POTASSIUM 50 MG/1
TABLET ORAL
Qty: 90 TABLET | Refills: 3 | OUTPATIENT
Start: 2022-11-09

## 2022-11-14 LAB
ANION GAP SERPL CALCULATED.3IONS-SCNC: 3.7 MMOL/L
BUN BLDV-MCNC: 22 MG/DL (ref 7–17)
CALCIUM SERPL-MCNC: 9.9 MG/DL (ref 8.4–10.2)
CHLORIDE BLD-SCNC: 108 MMOL/L (ref 98–120)
CO2: 27 MMOL/L (ref 22–31)
CREAT SERPL-MCNC: 1.1 MG/DL (ref 0.5–1)
GFR CALCULATED: 49.6
GLUCOSE: 90 MG/DL (ref 65–105)
POTASSIUM SERPL-SCNC: 5 MMOL/L (ref 3.6–5)
SODIUM BLD-SCNC: 138 MMOL/L (ref 135–145)

## 2022-11-21 DIAGNOSIS — F51.01 PRIMARY INSOMNIA: ICD-10-CM

## 2022-11-21 NOTE — TELEPHONE ENCOUNTER
Tony Wharton is requesting a refill on the following medication(s):  Requested Prescriptions     Pending Prescriptions Disp Refills    escitalopram (LEXAPRO) 10 MG tablet 30 tablet 3     Sig: take 1 tablet by mouth once daily       Last Visit Date (If Applicable):  8/15/4694    Next Visit Date:    1/30/2023

## 2022-11-22 RX ORDER — ESCITALOPRAM OXALATE 10 MG/1
10 TABLET ORAL DAILY
Qty: 90 TABLET | Refills: 3 | Status: SHIPPED | OUTPATIENT
Start: 2022-11-22

## 2022-11-28 ENCOUNTER — TELEPHONE (OUTPATIENT)
Dept: FAMILY MEDICINE CLINIC | Age: 87
End: 2022-11-28

## 2022-11-28 NOTE — TELEPHONE ENCOUNTER
Spoke with [facility] and the nurses were out and they would put a message in and call tomorrow with info

## 2022-11-28 NOTE — TELEPHONE ENCOUNTER
Please find out when this started. Make sure pulse Ox is OK. Mucinex BID. If pulse ox is OK and vitals are OK. Monitor with the mucinex for the next several days but let us know if not improving and please check the vitals daily until feeling better.

## 2022-11-29 NOTE — TELEPHONE ENCOUNTER
Spoke with [facility] and they did not have any vitals so she will take them and send fax with the info-will do mucinex BID as long as vitals are stable

## 2022-11-30 NOTE — TELEPHONE ENCOUNTER
Nurse notified by phone.    Today's vitals /53  Pulse 61 SPO2  94%    Temp 97.4  On 11/27 covid test was done and it was negative

## 2023-01-30 ENCOUNTER — OFFICE VISIT (OUTPATIENT)
Dept: FAMILY MEDICINE CLINIC | Age: 88
End: 2023-01-30
Payer: MEDICARE

## 2023-01-30 VITALS
BODY MASS INDEX: 28.32 KG/M2 | OXYGEN SATURATION: 96 % | HEART RATE: 62 BPM | SYSTOLIC BLOOD PRESSURE: 104 MMHG | DIASTOLIC BLOOD PRESSURE: 52 MMHG | WEIGHT: 165 LBS

## 2023-01-30 DIAGNOSIS — R41.89 COGNITIVE DEFICITS: ICD-10-CM

## 2023-01-30 DIAGNOSIS — Z15.02 MALIGNANT NEOPLASM OF RIGHT FALLOPIAN TUBE WITH BRCA2 GENE MUTATION (HCC): ICD-10-CM

## 2023-01-30 DIAGNOSIS — R53.82 CHRONIC FATIGUE: ICD-10-CM

## 2023-01-30 DIAGNOSIS — I10 ESSENTIAL HYPERTENSION: ICD-10-CM

## 2023-01-30 DIAGNOSIS — G30.9 ALZHEIMER'S DISEASE, UNSPECIFIED (CODE) (HCC): ICD-10-CM

## 2023-01-30 DIAGNOSIS — F51.01 PRIMARY INSOMNIA: ICD-10-CM

## 2023-01-30 DIAGNOSIS — Z15.01 MALIGNANT NEOPLASM OF RIGHT FALLOPIAN TUBE WITH BRCA2 GENE MUTATION (HCC): ICD-10-CM

## 2023-01-30 DIAGNOSIS — H61.23 HEARING LOSS OF BOTH EARS DUE TO CERUMEN IMPACTION: Primary | ICD-10-CM

## 2023-01-30 DIAGNOSIS — Z15.09 MALIGNANT NEOPLASM OF RIGHT FALLOPIAN TUBE WITH BRCA2 GENE MUTATION (HCC): ICD-10-CM

## 2023-01-30 DIAGNOSIS — C57.01 MALIGNANT NEOPLASM OF RIGHT FALLOPIAN TUBE WITH BRCA2 GENE MUTATION (HCC): ICD-10-CM

## 2023-01-30 DIAGNOSIS — N18.32 STAGE 3B CHRONIC KIDNEY DISEASE (HCC): ICD-10-CM

## 2023-01-30 PROCEDURE — 69210 REMOVE IMPACTED EAR WAX UNI: CPT | Performed by: FAMILY MEDICINE

## 2023-01-30 PROCEDURE — 1123F ACP DISCUSS/DSCN MKR DOCD: CPT | Performed by: FAMILY MEDICINE

## 2023-01-30 PROCEDURE — G8427 DOCREV CUR MEDS BY ELIG CLIN: HCPCS | Performed by: FAMILY MEDICINE

## 2023-01-30 PROCEDURE — 99214 OFFICE O/P EST MOD 30 MIN: CPT | Performed by: FAMILY MEDICINE

## 2023-01-30 PROCEDURE — 99212 OFFICE O/P EST SF 10 MIN: CPT | Performed by: FAMILY MEDICINE

## 2023-01-30 PROCEDURE — 1090F PRES/ABSN URINE INCON ASSESS: CPT | Performed by: FAMILY MEDICINE

## 2023-01-30 PROCEDURE — 1036F TOBACCO NON-USER: CPT | Performed by: FAMILY MEDICINE

## 2023-01-30 PROCEDURE — G8484 FLU IMMUNIZE NO ADMIN: HCPCS | Performed by: FAMILY MEDICINE

## 2023-01-30 PROCEDURE — G8417 CALC BMI ABV UP PARAM F/U: HCPCS | Performed by: FAMILY MEDICINE

## 2023-01-30 RX ORDER — ACETAMINOPHEN 325 MG/1
650 TABLET ORAL EVERY 6 HOURS PRN
COMMUNITY

## 2023-01-30 RX ORDER — TORSEMIDE 5 MG/1
5 TABLET ORAL EVERY OTHER DAY
Qty: 45 TABLET | Refills: 3 | Status: SHIPPED | OUTPATIENT
Start: 2023-01-30

## 2023-01-30 RX ORDER — LOSARTAN POTASSIUM 25 MG/1
25 TABLET ORAL DAILY
Qty: 90 TABLET | Refills: 3 | Status: SHIPPED | OUTPATIENT
Start: 2023-01-30 | End: 2023-02-01 | Stop reason: SDUPTHER

## 2023-01-30 RX ORDER — LOSARTAN POTASSIUM 50 MG/1
TABLET ORAL
Qty: 90 TABLET | Refills: 3 | Status: SHIPPED | OUTPATIENT
Start: 2023-01-30 | End: 2023-01-30 | Stop reason: DRUGHIGH

## 2023-01-30 RX ORDER — TRIAMCINOLONE ACETONIDE 0.25 MG/G
OINTMENT TOPICAL 2 TIMES DAILY PRN
COMMUNITY

## 2023-01-30 RX ORDER — DONEPEZIL HYDROCHLORIDE 10 MG/1
10 TABLET, FILM COATED ORAL NIGHTLY
Qty: 90 TABLET | Refills: 3 | Status: SHIPPED | OUTPATIENT
Start: 2023-01-30 | End: 2023-02-01

## 2023-01-30 RX ORDER — ESCITALOPRAM OXALATE 10 MG/1
10 TABLET ORAL DAILY
Qty: 90 TABLET | Refills: 3 | Status: SHIPPED | OUTPATIENT
Start: 2023-01-30

## 2023-01-30 RX ORDER — SENNOSIDES 8.6 MG
650 CAPSULE ORAL EVERY 8 HOURS PRN
COMMUNITY

## 2023-01-30 ASSESSMENT — PATIENT HEALTH QUESTIONNAIRE - PHQ9
SUM OF ALL RESPONSES TO PHQ QUESTIONS 1-9: 0
2. FEELING DOWN, DEPRESSED OR HOPELESS: 0
SUM OF ALL RESPONSES TO PHQ QUESTIONS 1-9: 0
1. LITTLE INTEREST OR PLEASURE IN DOING THINGS: 0
SUM OF ALL RESPONSES TO PHQ9 QUESTIONS 1 & 2: 0

## 2023-01-30 NOTE — PROGRESS NOTES
[facility]  [address]  Dept: [phone]  Dept [facility]:[phone]    Bobbi Thomas is a 80 y.o. female who presents today for her medical conditions/complaints as notedbelow. Bobbi Thomas is c/o of Hypertension (6mo follow up) and Hearing Problem (Decreased hearing- thinks there is wax in her ears)      Assessment/Plan:     1. Hearing loss of both ears due to cerumen impaction  2. Primary insomnia  -     escitalopram (LEXAPRO) 10 MG tablet; Take 1 tablet by mouth daily take 1 tablet by mouth once daily, Disp-90 tablet, R-3Normal  3. Cognitive deficits  -     donepezil (ARICEPT) 10 MG tablet; Take 1 tablet by mouth nightly, Disp-90 tablet, R-3Normal  4. Chronic fatigue  -     donepezil (ARICEPT) 10 MG tablet; Take 1 tablet by mouth nightly, Disp-90 tablet, R-3Normal  5. Essential hypertension  -     torsemide (DEMADEX) 5 MG tablet; Take 1 tablet by mouth every other day, Disp-45 tablet, R-3Normal  -     losartan (COZAAR) 25 MG tablet; Take 1 tablet by mouth daily, Disp-90 tablet, R-3Normal  6. 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 Alzheimer's disease, unspecified (CODE) ([facility] [address]  8. Stage 3b chronic kidney disease ([facility] [address]    Recheck ears at next visit. Cleared today of cerumen. Hopefully this will help with the hearing issue. Seems to be tolerating the donepezil well. No adjustment needed at this point. Blood pressure is very well controlled and in fact running a little low. We will decrease the losartan to 25 mg daily. With the borderline renal function will continue the low dose to help protect the kidneys. Seems to be tolerating the Lexapro very well. Mood is good. Seems to be sleeping reasonably well so we will continue at the 10 mg dosage.     Lab Results   Component Value Date    WBC 9.3 01/26/2022    HGB 12.9 01/26/2022    HCT 36.8 (L) 01/26/2022    .2 01/26/2022    ALT 18 01/26/2022    AST 27 01/26/2022     11/14/2022    K 5.0 11/14/2022     11/14/2022    CREATININE 1.1 (H) 11/14/2022    BUN 22 (H) 11/14/2022    CO2 27 11/14/2022    TSH 2.64 05/29/2018    INR 1.0 04/14/2018       Return in about 26 weeks (around 7/31/2023). Subjective:      HPI:     HPI    Has had more trouble hearing recently-- has the hearing aides but thinks it is getting worse. Some of her daughters she cannot hear at all. Getting bad in the last year. Does get the hearing aides checked yearly-- in the fall usually. Is exercising in her room daily, some walking - to the meals and not really much more than that. Not dizzy or lightheaded at all. Not much swelling at all. No CP/ SOB/ palpitations noted/  Overall grateful for how good she feels. Appetite is good.        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     no specifics given     Cancer ([facility] [address] 1982, 2006    bilat breasts    Closed fracture of left wrist 06/03/2021    GERD (gastroesophageal reflux disease)     Hypertension     Wears glasses       Past Surgical History:   Procedure Laterality Date    ABDOMEN SURGERY  02/07/2018     EXPLORATORY LAPAROTOMY, TOTAL ABDOMINAL HYSTERECTOMY, BSO, APPENDECTOMY, OMENTUMECTOMY, RADICAL TUMOR DEBULKING USING     CATARACT REMOVAL Bilateral     COLONOSCOPY      MASTECTOMY Left 1982    MASTECTOMY Right 2006    [place] OFFICE/OUTPT VISIT,PROCEDURE ONLY N/A 2/7/2018    EXPLORATORY LAPAROTOMY, TOTAL ABDOMINAL HYSTERECTOMY, BSO, APPENDECTOMY, OMENTUMECTOMY, RADICAL TUMOR DEBULKING USING SONOPET. performed by Jadiel Verde MD at [facility] (CERVIX REMOVED)  02/07/2018    UPPER GASTROINTESTINAL ENDOSCOPY  07/2016       Family History   Problem Relation Age of Onset    Breast Cancer Mother         breast cancer    Other Mother         gallstones, water retention    Other Father         parkinsons    Breast Cancer Other         states sibling    Hypertension Other         states sibling     Osteoarthritis Other         states sibling had Severe     Stroke Sister     Endometrial Cancer Sister     Breast Cancer Sister     BRCA 1/2 Sister     Hypertension Sister     Breast Cancer Daughter        Social History     Tobacco Use    Smoking status: Never    Smokeless tobacco: Never   Substance Use Topics    Alcohol use: Yes     Alcohol/week: 1.0 standard drink     Types: 1 Glasses of wine per week      Current Outpatient Medications   Medication Sig Dispense Refill    triamcinolone (KENALOG) 0.025 % ointment Apply topically 2 times daily as needed      acetaminophen (TYLENOL) 325 MG tablet Take 650 mg by mouth every 6 hours as needed for Pain      acetaminophen (TYLENOL 8 HOUR ARTHRITIS PAIN) 650 MG extended release tablet Take 650 mg by mouth every 8 hours as needed for Pain      escitalopram (LEXAPRO) 10 MG tablet Take 1 tablet by mouth daily take 1 tablet by mouth once daily 90 tablet 3    donepezil (ARICEPT) 10 MG tablet Take 1 tablet by mouth nightly 90 tablet 3    torsemide (DEMADEX) 5 MG tablet Take 1 tablet by mouth every other day 45 tablet 3    losartan (COZAAR) 25 MG tablet Take 1 tablet by mouth daily 90 tablet 3    polyethylene glycol (GLYCOLAX) 17 GM/SCOOP powder Take 17 g by mouth daily 850 g 5    ciclopirox (LOPROX) 0.77 % cream   0    vitamin D (CHOLECALCIFEROL) 1000 UNIT TABS tablet Take 1,000 Units by mouth daily      denosumab (PROLIA) 60 MG/ML SOSY SC injection Inject 1 mL into the skin once for 1 dose 1 mL 0    Handicap Placard MISC by Does not apply route The disability is expected to last 5 years  Dx: cancer, pain 1 each 1     No current facility-administered medications for this visit. Allergies   Allergen Reactions    Amoxicillin      Hives         Health Maintenance   Topic Date Due    COVID-19 Vaccine (4 - Booster for Pfizer series) 01/26/2022    Flu vaccine (1) 08/01/2022    Depression Screen  07/28/2023    Annual Wellness Visit (AWV)  07/29/2023    DTaP/Tdap/Td vaccine (2 - Td or Tdap) 01/08/2028    Shingles vaccine  Completed    Pneumococcal 65+ years Vaccine  Completed    Hepatitis A vaccine  Aged Out    Hib vaccine  Aged Out    Meningococcal (ACWY) vaccine  Aged Out         Review of Systems    Objective:     BP (!) 104/52 (Site: Right Upper Arm, Position: Sitting, Cuff Size: Medium Adult)   Pulse 62   Wt 165 lb (74.8 kg)   LMP  (LMP Unknown)   SpO2 96%   BMI 28.32 kg/m²     Physical Exam  Vitals and nursing note reviewed. Constitutional:       Appearance: Normal appearance. She is well-developed. HENT:      Head: Normocephalic and atraumatic. Right Ear: Tympanic membrane and external ear normal.      Left Ear: Tympanic membrane and external ear normal.      Ears:      Comments: Bilateral ear canal completely occluded with cerumen. Cleared to facilitate exam with curette. Patient tolerated well. Eyes:      Conjunctiva/sclera: Conjunctivae normal.   Neck:      Thyroid: No thyromegaly. Vascular: No JVD. Cardiovascular:      Rate and Rhythm: Normal rate and regular rhythm. Heart sounds: Normal heart sounds. No murmur heard. No friction rub. No gallop. Pulmonary:      Effort: Pulmonary effort is normal. No respiratory distress. Breath sounds: Normal breath sounds. Musculoskeletal:      Cervical back: Normal range of motion and neck supple. Right lower leg: Edema present. Left lower leg: Edema present. Comments: Trace LE edema noted only on the right side   Lymphadenopathy:      Cervical: No cervical adenopathy. Skin:     General: Skin is warm. Capillary Refill: Capillary refill takes less than 2 seconds.    Neurological: General: No focal deficit present. Mental Status: She is alert and oriented to person, place, and time. Psychiatric:         Mood and Affect: Mood normal.         Behavior: Behavior normal.         Thought Content: Thought content normal.         Judgment: Judgment normal.             Multiple labs and other testing may have been ordered which may not be completely evident from the above note due to system interface incompatibilities. Patient given educational materials - see patientinstructions. Discussed use, benefit, and side effects of prescribed medications. All patient questions answered. Pt voiced understanding. Reviewed health maintenance. Instructed to continue current medications, diet andexercise. Patient agreed with treatment plan. Follow up as directed.      (Please note that portions of this note were completed with a voice-recognition program. Efforts were made to edit the dictation but occasionally words are mis-transcribed.)    Electronically signed by Claus Renteria MD on 1/30/2023

## 2023-02-01 DIAGNOSIS — R53.82 CHRONIC FATIGUE: ICD-10-CM

## 2023-02-01 DIAGNOSIS — I10 ESSENTIAL HYPERTENSION: ICD-10-CM

## 2023-02-01 DIAGNOSIS — R41.89 COGNITIVE DEFICITS: ICD-10-CM

## 2023-02-01 RX ORDER — DONEPEZIL HYDROCHLORIDE 10 MG/1
10 TABLET, FILM COATED ORAL NIGHTLY
Qty: 90 TABLET | Refills: 3 | Status: SHIPPED | OUTPATIENT
Start: 2023-02-01

## 2023-02-01 RX ORDER — LOSARTAN POTASSIUM 50 MG/1
TABLET ORAL
Qty: 90 TABLET | Refills: 3 | Status: SHIPPED | OUTPATIENT
Start: 2023-02-01

## 2023-02-01 NOTE — TELEPHONE ENCOUNTER
Bekah Salter is requesting a refill on the following medication(s):  Requested Prescriptions     Pending Prescriptions Disp Refills    losartan (COZAAR) 50 MG tablet [Pharmacy Med Name: LOSARTAN POTASSIUM 50 MG TAB] 90 tablet 3     Sig: take 1 tablet by mouth once daily    donepezil (ARICEPT) 10 MG tablet [Pharmacy Med Name: DONEPEZIL HCL 10 MG TABLET] 90 tablet 3     Sig: take 1 tablet by mouth nightly       Last Visit Date (If Applicable):  1/15/7420    Next Visit Date:    7/31/2023

## 2023-02-15 ENCOUNTER — TELEPHONE (OUTPATIENT)
Dept: FAMILY MEDICINE CLINIC | Age: 88
End: 2023-02-15

## 2023-02-15 DIAGNOSIS — I10 ESSENTIAL HYPERTENSION: ICD-10-CM

## 2023-02-15 RX ORDER — LOSARTAN POTASSIUM 25 MG/1
25 TABLET ORAL DAILY
Qty: 90 TABLET | Refills: 3
Start: 2023-02-15

## 2023-02-15 NOTE — TELEPHONE ENCOUNTER
Patient's daughter called -  patient's losartan was decreased to 25mg daily at appointment on 1/30/23. Pharmacy filled losartan 50 mg (from refill request on 2/1/23). Daughter now has the 50 mg tables and they will not fill the 25 mg tablets because it is too soon for a refill. Advised to cut 50 mg tablets in 1/2 until supply is used. Called [facility] to cancel any more refills on losartan 50 mg. Verified that pharmacy has rx for 25 mg tablets. Med list updated. Daughter verbalized understanding.

## 2023-02-20 ENCOUNTER — TELEPHONE (OUTPATIENT)
Dept: FAMILY MEDICINE CLINIC | Age: 88
End: 2023-02-20

## 2023-02-20 NOTE — TELEPHONE ENCOUNTER
Please call and check on Celena-- no notes in the chart from the weekend, no notes from clinic or ER/ urgent care.

## 2023-02-20 NOTE — TELEPHONE ENCOUNTER
Spoke with daughter Myriam-[facility] was told could not see her for 2 hours when she got there around 130pm said would be around 3pm they'd see her, so went to [facility] urgent care and they sent her to be seen at the ER. LUISITO westfall looked her over and made her walk for them and that was about it no additional testing. Was told to use walker when gets up from bed to go to the bathroom when feeling unsteady.  She was very unsteady on Friday but been has been fine since then and no other issues per daughter, also states Celena does not remember much from Friday as to how she was feeling or what exactly happened

## 2023-02-21 NOTE — TELEPHONE ENCOUNTER
Reviewed ER record-- no changes at this time but if further episodes then would consider further evaluation.

## 2023-03-09 ENCOUNTER — OFFICE VISIT (OUTPATIENT)
Dept: FAMILY MEDICINE CLINIC | Age: 88
End: 2023-03-09
Payer: MEDICARE

## 2023-03-09 VITALS
SYSTOLIC BLOOD PRESSURE: 98 MMHG | OXYGEN SATURATION: 96 % | DIASTOLIC BLOOD PRESSURE: 50 MMHG | BODY MASS INDEX: 28.15 KG/M2 | WEIGHT: 164 LBS | HEART RATE: 59 BPM

## 2023-03-09 DIAGNOSIS — I10 ESSENTIAL HYPERTENSION: ICD-10-CM

## 2023-03-09 DIAGNOSIS — R55 VASOVAGAL SYNCOPE: ICD-10-CM

## 2023-03-09 DIAGNOSIS — R41.3 MEMORY LOSS: ICD-10-CM
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
## 2023-03-09 LAB
ALBUMIN/GLOBULIN RATIO: 1.3 G/DL
ALBUMIN: 4.1 G/DL (ref 3.5–5)
ALP BLD-CCNC: 84 UNITS/L (ref 38–126)
ALT SERPL-CCNC: 21 UNITS/L (ref 4–35)
ANION GAP SERPL CALCULATED.3IONS-SCNC: 3.8 MMOL/L
AST SERPL-CCNC: 24 UNITS/L (ref 14–36)
BASOPHILS %: 1.59 (ref 0–3)
BASOPHILS ABSOLUTE: 0.16 (ref 0–0.3)
BILIRUB SERPL-MCNC: 0.5 MG/DL (ref 0.2–1.3)
BUN BLDV-MCNC: 23 MG/DL (ref 7–17)
CALCIUM SERPL-MCNC: 9.4 MG/DL (ref 8.4–10.2)
CHLORIDE BLD-SCNC: 108 MMOL/L (ref 98–120)
CO2: 26 MMOL/L (ref 22–31)
CREAT SERPL-MCNC: 1 MG/DL (ref 0.5–1)
EOSINOPHILS %: 1.62 (ref 0–10)
EOSINOPHILS ABSOLUTE: 0.17 (ref 0–1.1)
GFR CALCULATED: 55.2
GLOBULIN: 3.2 G/DL
GLUCOSE: 85 MG/DL (ref 65–105)
HCT VFR BLD CALC: 40.1 % (ref 37–47)
HEMOGLOBIN: 13 (ref 12–16)
LYMPHOCYTE %: 17.46 (ref 20–51.1)
LYMPHOCYTES ABSOLUTE: 1.79 (ref 1–5.5)
MCH RBC QN AUTO: 30.2 PG (ref 28.5–32.5)
MCHC RBC AUTO-ENTMCNC: 32.3 G/DL (ref 32–37)
MCV RBC AUTO: 93.4 FL (ref 80–94)
MONOCYTES %: 12.05 (ref 1.7–9.3)
MONOCYTES ABSOLUTE: 1.24 (ref 0.1–1)
NEUTROPHILS %: 67.28 (ref 42.2–75.2)
NEUTROPHILS ABSOLUTE: 6.91 (ref 2–8.1)
PDW BLD-RTO: 13.5 % (ref 8.5–15.5)
PLATELET # BLD: 193.7 THOU/MM3 (ref 130–400)
POTASSIUM SERPL-SCNC: 4.6 MMOL/L (ref 3.6–5)
RBC: 4.3 M/UL (ref 4.2–5.4)
SODIUM BLD-SCNC: 138 MMOL/L (ref 135–145)
TOTAL PROTEIN, SERUM: 7.3 G/DL (ref 6.3–8.2)
WBC: 10.3 THOU/ML3 (ref 4.8–10.8)

## 2023-03-09 PROCEDURE — 99213 OFFICE O/P EST LOW 20 MIN: CPT | Performed by: FAMILY MEDICINE

## 2023-03-09 PROCEDURE — 93005 ELECTROCARDIOGRAM TRACING: CPT | Performed by: FAMILY MEDICINE

## 2023-03-09 RX ORDER — TORSEMIDE 5 MG/1
2.5 TABLET ORAL DAILY
Qty: 45 TABLET | Refills: 3
Start: 2023-03-09

## 2023-03-09 SDOH — ECONOMIC STABILITY: INCOME INSECURITY: HOW HARD IS IT FOR YOU TO PAY FOR THE VERY BASICS LIKE FOOD, HOUSING, MEDICAL CARE, AND HEATING?: NOT HARD AT ALL

## 2023-03-09 SDOH — ECONOMIC STABILITY: HOUSING INSECURITY
IN THE LAST 12 MONTHS, WAS THERE A TIME WHEN YOU DID NOT HAVE A STEADY PLACE TO SLEEP OR SLEPT IN A SHELTER (INCLUDING NOW)?: NO

## 2023-03-09 SDOH — ECONOMIC STABILITY: FOOD INSECURITY: WITHIN THE PAST 12 MONTHS, THE FOOD YOU BOUGHT JUST DIDN'T LAST AND YOU DIDN'T HAVE MONEY TO GET MORE.: NEVER TRUE

## 2023-03-09 SDOH — ECONOMIC STABILITY: FOOD INSECURITY: WITHIN THE PAST 12 MONTHS, YOU WORRIED THAT YOUR FOOD WOULD RUN OUT BEFORE YOU GOT MONEY TO BUY MORE.: NEVER TRUE

## 2023-03-09 NOTE — PROGRESS NOTES
[facility]  [address]  Dept: [phone]  Dept TMI:[phone]    Asim Langford is a 80 y.o. female who presents today for her medical conditions/complaints as notedbelow. Asim Langford is c/o of Fall (Follow up after fall 2 weeks ago), Memory Loss (Feels like her memory has been worse since the fall. /), and Arm Pain (Left arm pain for the past 2 days )      Assessment/Plan:     1. Fall, initial encounter  -     [address]; Future  2. Essential hypertension  -     torsemide (DEMADEX) 5 MG tablet; Take 0.5 tablets by mouth daily, Disp-45 tablet, R-3Adjust Sig  3. Vasovagal syncope  -     torsemide (DEMADEX) 5 MG tablet; Take 0.5 tablets by mouth daily, Disp-45 tablet, R-3Adjust Sig  -     EKG 12 Lead - Clinic Performed; Future  4. Memory loss  -     CT HEAD WO CONTRAST; Future    [facility]"" had a mild viral infection that caused her syncopal episode. However with the blood pressure persistently running low today will hold her Cozaar completely and monitor her blood pressure carefully. We will also change the torsemide from 5 mg every other day to 2.5 mg daily to hopefully better level out the blood pressure. We will recheck labs in several weeks to ensure that with this adjustment potassium is staying stable. With the fall and memory lapse will get a CT scan of the head to ensure no underlying pathology. Shoulder pain is consistent with arthritic changes. No changes at this time as it is minimally symptomatic can use the Tylenol as needed. Consider steroid injection or therapy if increasing symptoms.     Lab Results   Component Value Date    WBC 10.3 03/09/2023    HGB 13.0 03/09/2023    HCT 40.1 03/09/2023    .7 03/09/2023    ALT 21 03/09/2023    AST 24 03/09/2023     03/09/2023    K 4.6 03/09/2023     03/09/2023    CREATININE 1.0 03/09/2023    BUN 23 (H) 03/09/2023    CO2 26 03/09/2023 TSH 2.64 05/29/2018    INR 1.0 04/14/2018       Return if symptoms worsen or fail to improve. Subjective:      HPI:     HPI    Does not remember what happened. This was in the middle of the night. Was fine when she went to bed. Thinks she got up to the bathroom and then she remembers being     Assisted living says she pulled her call light and staff came and checked on her and found her on the floor with poor color, unsteady but normal VS.      The next day she was also unsteady and color was not good. Used the walker the next day but then was fine. Did have some diarrhea after the ER visit and she had reported to the nursing staff she had emesis x 1 but does not now remember that. Since that time she has been kind of in and out of the \"fog\"  Took really a week to seem to remember to talk about it. Since then she has not really been 100%. No injury noted per se that her staff noted or noted in the ER. No headaches. Did not really feel ill per se. In the ER no blood work or UA was done. Exam was normal.      Did complain about the left arm hurting in the last few days-- did hurt a little in the last few days.          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     no specifics given     Cancer ([facility] [address] 1982, 2006    bilat breasts    Closed fracture of left wrist 06/03/2021    GERD (gastroesophageal reflux disease)     Hypertension     Wears glasses       Past Surgical History:   Procedure Laterality Date    ABDOMEN SURGERY  02/07/2018     EXPLORATORY LAPAROTOMY, TOTAL ABDOMINAL HYSTERECTOMY, BSO, APPENDECTOMY, OMENTUMECTOMY, RADICAL TUMOR DEBULKING USING     CATARACT REMOVAL Bilateral     COLONOSCOPY MASTECTOMY Left 1982    MASTECTOMY Right 2006    [place] OFFICE/OUTPT VISIT,PROCEDURE ONLY N/A 2/7/2018    EXPLORATORY LAPAROTOMY, TOTAL ABDOMINAL HYSTERECTOMY, BSO, APPENDECTOMY, OMENTUMECTOMY, RADICAL TUMOR DEBULKING USING SONOPET. performed by Mayito Maravilla MD at [facility] (CERVIX REMOVED)  02/07/2018    UPPER GASTROINTESTINAL ENDOSCOPY  07/2016       Family History   Problem Relation Age of Onset    Breast Cancer Mother         breast cancer    Other Mother         gallstones, water retention    Other Father         parkinsons    Breast Cancer Other         states sibling    Hypertension Other         states sibling     Osteoarthritis Other         states sibling had Severe     Stroke Sister     Endometrial Cancer Sister     Breast Cancer Sister     BRCA 1/2 Sister     Hypertension Sister     Breast Cancer Daughter        Social History     Tobacco Use    Smoking status: Never    Smokeless tobacco: Never   Substance Use Topics    Alcohol use:  Yes     Alcohol/week: 1.0 standard drink     Types: 1 Glasses of wine per week      Current Outpatient Medications   Medication Sig Dispense Refill    torsemide (DEMADEX) 5 MG tablet Take 0.5 tablets by mouth daily 45 tablet 3    donepezil (ARICEPT) 10 MG tablet take 1 tablet by mouth nightly 90 tablet 3    triamcinolone (KENALOG) 0.025 % ointment Apply topically 2 times daily as needed      acetaminophen (TYLENOL) 325 MG tablet Take 650 mg by mouth every 6 hours as needed for Pain      acetaminophen (TYLENOL) 650 MG extended release tablet Take 650 mg by mouth every 8 hours as needed for Pain      escitalopram (LEXAPRO) 10 MG tablet Take 1 tablet by mouth daily take 1 tablet by mouth once daily 90 tablet 3    polyethylene glycol (GLYCOLAX) 17 GM/SCOOP powder Take 17 g by mouth daily 850 g 5    ciclopirox (LOPROX) 0.77 % cream   0    vitamin D (CHOLECALCIFEROL) 1000 UNIT TABS tablet Take 1,000 Units by mouth daily      denosumab (PROLIA) 60 MG/ML SOSY SC injection Inject 1 mL into the skin once for 1 dose 1 mL 0    Handicap Placard MISC by Does not apply route The disability is expected to last 5 years  Dx: cancer, pain 1 each 1     No current facility-administered medications for this visit. Allergies   Allergen Reactions    Amoxicillin      Hives         Health Maintenance   Topic Date Due    COVID-19 Vaccine (4 - Booster for Pfizer series) 01/26/2022    Flu vaccine (1) 08/01/2022    Annual Wellness Visit (AWV)  07/29/2023    Depression Screen  01/30/2024    DTaP/Tdap/Td vaccine (2 - Td or Tdap) 01/08/2028    Shingles vaccine  Completed    Pneumococcal 65+ years Vaccine  Completed    Hepatitis A vaccine  Aged Out    Hib vaccine  Aged Out    Meningococcal (ACWY) vaccine  Aged Out         Review of Systems    Objective:     BP (!) 98/50 (Site: Right Upper Arm, Position: Sitting, Cuff Size: Medium Adult)   Pulse 59   Wt 164 lb (74.4 kg)   LMP  (LMP Unknown)   SpO2 96%   BMI 28.15 kg/m²     Physical Exam  Vitals and nursing note reviewed. Constitutional:       Appearance: Normal appearance. She is well-developed. HENT:      Head: Normocephalic and atraumatic. Right Ear: External ear normal.      Left Ear: External ear normal.   Eyes:      Extraocular Movements: Extraocular movements intact. Conjunctiva/sclera: Conjunctivae normal.   Neck:      Thyroid: No thyromegaly. Vascular: No JVD. Cardiovascular:      Rate and Rhythm: Normal rate and regular rhythm. Pulses: Normal pulses. Heart sounds: Normal heart sounds. No murmur heard. No friction rub. No gallop. Pulmonary:      Effort: Pulmonary effort is normal. No respiratory distress. Breath sounds: Normal breath sounds. Abdominal:      Palpations: Abdomen is soft. There is no mass. Tenderness: There is no abdominal tenderness. Musculoskeletal:         General: Tenderness present. Cervical back: Normal range of motion and neck supple.       Right lower leg: Edema (Trace pretibial pitting) present.      Left lower leg: No edema.      Comments: Mild subacromial tenderness on the left shoulder.  Mild pain with Neer but otherwise normal range of motion and negative shoulder exam positive crossover.   Lymphadenopathy:      Cervical: No cervical adenopathy.   Skin:     General: Skin is warm.      Capillary Refill: Capillary refill takes less than 2 seconds.   Neurological:      General: No focal deficit present.      Mental Status: She is alert and oriented to person, place, and time.   Psychiatric:         Mood and Affect: Mood normal.         Behavior: Behavior normal.         Thought Content: Thought content normal.         Judgment: Judgment normal.             Multiple labs and other testing may have been ordered which may not be completely evident from the above note due to system interface incompatibilities.     Patient given educational materials - see patientinstructions.  Discussed use, benefit, and side effects of prescribed medications.  All patient questions answered.  Pt voiced understanding. Reviewed health maintenance.  Instructed to continue current medications, diet andexercise.  Patient agreed with treatment plan. Follow up as directed.     (Please note that portions of this note were completed with a voice-recognition program. Efforts were made to edit the dictation but occasionally words are mis-transcribed.)    Electronically signed by Shonna Diehl MD on 3/12/2023

## 2023-03-17 ENCOUNTER — TELEPHONE (OUTPATIENT)
Dept: FAMILY MEDICINE CLINIC | Age: 88
End: 2023-03-17

## 2023-03-21 NOTE — TELEPHONE ENCOUNTER
Blood pressures are quite variable-- with the multiple low readings will continue on the lower dose of the blood pressure medications.   Please let daughter know

## 2023-03-21 NOTE — TELEPHONE ENCOUNTER
Daughter notified. She states patient is not on BP med any longer. Losartan was discontinued. She is still on torsemide 2.5 mg daily.

## 2023-04-24 ENCOUNTER — TELEPHONE (OUTPATIENT)
Dept: FAMILY MEDICINE CLINIC | Age: 88
End: 2023-04-24

## 2023-04-24 NOTE — TELEPHONE ENCOUNTER
Can use a daily cetirizine 10 mg to help with the itching. Can use the Kenalog cream to the legs as needed. If continuing should be evaluated.   Could video visit or send a picture with a web message

## 2023-05-03 NOTE — ASSESSMENT & PLAN NOTE
Patient is healing as expected I do not expect any complications at this point. Based on the location of this wound on her face I think this is likely related to the oxygen tubing and possibly some tape that was used to secure it in place during the procedure. It is healing and there is no signs of infection or other issues that would cause any ongoing problems. I expect that it should heal up without any issues. Patient will follow up as needed. Alert and oriented to person, place and time/Patient baseline mental status/Awake

## 2023-07-24 LAB
ANION GAP SERPL CALCULATED.3IONS-SCNC: 7.6 MMOL/L (ref 4–12)
BUN BLDV-MCNC: 21 MG/DL (ref 7–17)
CALCIUM SERPL-MCNC: 9.4 MG/DL (ref 8.4–10.2)
CHLORIDE BLD-SCNC: 104 MMOL/L (ref 98–120)
CO2: 26 MMOL/L (ref 22–31)
CREAT SERPL-MCNC: 0.9 MG/DL (ref 0.5–1)
GFR CALCULATED: > 60
GLUCOSE: 90 MG/DL (ref 65–105)
HCT VFR BLD CALC: 43 % (ref 37–47)
HEMOGLOBIN: 13.1 (ref 12–16)
MCH RBC QN AUTO: 27.6 PG (ref 28.5–32.5)
MCHC RBC AUTO-ENTMCNC: 30.4 G/DL (ref 32–37)
MCV RBC AUTO: 90.9 FL (ref 80–94)
PDW BLD-RTO: 12.9 % (ref 8.5–15.5)
PLATELET # BLD: 165.4 THOU/MM3 (ref 130–400)
POTASSIUM SERPL-SCNC: 4 MMOL/L (ref 3.6–5)
RBC: 4.73 M/UL (ref 4.2–5.4)
SODIUM BLD-SCNC: 137 MMOL/L (ref 135–145)
WBC: 9.1 THOU/ML3 (ref 4.8–10.8)

## 2023-07-27 ENCOUNTER — OFFICE VISIT (OUTPATIENT)
Dept: FAMILY MEDICINE CLINIC | Age: 88
End: 2023-07-27
Payer: MEDICARE

## 2023-07-27 VITALS
OXYGEN SATURATION: 96 % | WEIGHT: 164 LBS | BODY MASS INDEX: 28 KG/M2 | HEART RATE: 57 BPM | DIASTOLIC BLOOD PRESSURE: 70 MMHG | HEIGHT: 64 IN | SYSTOLIC BLOOD PRESSURE: 162 MMHG

## 2023-07-27 DIAGNOSIS — R41.3 MEMORY LOSS: ICD-10-CM

## 2023-07-27 DIAGNOSIS — I10 PRIMARY HYPERTENSION: ICD-10-CM

## 2023-07-27 DIAGNOSIS — Z00.00 MEDICARE ANNUAL WELLNESS VISIT, SUBSEQUENT: Primary | ICD-10-CM

## 2023-07-27 DIAGNOSIS — N18.32 STAGE 3B CHRONIC KIDNEY DISEASE (HCC): ICD-10-CM

## 2023-07-27 DIAGNOSIS — I10 ESSENTIAL HYPERTENSION: ICD-10-CM

## 2023-07-27 PROCEDURE — G0439 PPPS, SUBSEQ VISIT: HCPCS | Performed by: FAMILY MEDICINE

## 2023-07-27 PROCEDURE — 99397 PER PM REEVAL EST PAT 65+ YR: CPT | Performed by: FAMILY MEDICINE

## 2023-07-27 PROCEDURE — 1123F ACP DISCUSS/DSCN MKR DOCD: CPT | Performed by: FAMILY MEDICINE

## 2023-07-27 ASSESSMENT — PATIENT HEALTH QUESTIONNAIRE - PHQ9
1. LITTLE INTEREST OR PLEASURE IN DOING THINGS: 0
SUM OF ALL RESPONSES TO PHQ QUESTIONS 1-9: 0
SUM OF ALL RESPONSES TO PHQ QUESTIONS 1-9: 0
SUM OF ALL RESPONSES TO PHQ9 QUESTIONS 1 & 2: 0
2. FEELING DOWN, DEPRESSED OR HOPELESS: 0
SUM OF ALL RESPONSES TO PHQ QUESTIONS 1-9: 0
SUM OF ALL RESPONSES TO PHQ QUESTIONS 1-9: 0

## 2023-07-27 NOTE — PATIENT INSTRUCTIONS
your use of this information. Learning About Activities of Daily Living  What are activities of daily living? Activities of daily living (ADLs) are the basic self-care tasks you do every day. As you age, and if you have health problems, you may find that it's harder to do these things for yourself. That's when you may need some help. Your doctor uses ADLs to measure how much help you need. Knowing what you can and can't do for yourself is an important first step to getting help. And when you have the help you need, you can stay as independent as possible. Your doctor will want to know if you are able to do tasks such as: Take a bath or shower without help. Go to the bathroom by yourself. Dress and undress without help. Shave, comb your hair, and brush teeth on your own. Get in and out of bed or a chair without help. Feed yourself without help. If you are having trouble doing basic self-care tasks, talk with your doctor. You may want to bring a caregiver or family member who can help the doctor understand your needs and abilities. How will a doctor assess your ADLs? Asking about ADLs is part of a routine health checkup your doctor will likely do as you age. Your health check might be done in a doctor's office, in your home, or at a hospital. The goal is to find out if you are having any problems that could make your health problems worse or that make it unsafe for you to be on your own. To measure your ADLs, your doctor will ask how hard it is for you to do routine tasks. He or she may also want to know if you have changed the way you do a task because of a health problem. He or she may watch how you:  Walk back and forth. Keep your balance while you stand or walk. Move from sitting to standing or from a bed to a chair. Button or unbutton a shirt or sweater. Remove and put on your shoes.   It's normal to feel a little worried or anxious if you find you can't do all the things you used to

## 2023-07-27 NOTE — PROGRESS NOTES
Medicare Annual Wellness Visit    Patience Meth is here for Medicare AWV and Foot Swelling (Top of left foot was swollen, warm, and red. Started over the weekend. Foot looks better today. )    Assessment & Plan   Medicare annual wellness visit, subsequent  Primary hypertension  Essential hypertension  Memory loss  Stage 3b chronic kidney disease ([address])    Recommendations for Preventive Services Due: see orders and patient instructions/AVS.    Feet are bilaterally returned to normal so no changes indicated at this time. HTN is asx and control is reasonable-- with the lower BP at the last visit. We will continue to monitor closely. If continues to increase could consider low dose of ARB to control. Memory is very stable at this time. COntinue on her current medications. Recommended screening schedule for the next 5-10 years is provided to the patient in written form: see Patient Instructions/AVS.     Return in about 6 months (around 1/27/2024) for Medication recheck. Subjective   The following acute and/or chronic problems were also addressed today:  Medicare AWV and Foot Swelling (Top of left foot was swollen, warm, and red. Started over the weekend. Foot looks better today. )    Patient's complete Health Risk Assessment and screening values have been reviewed and are found in Flowsheets. The following problems were reviewed today and where indicated follow up appointments were made and/or referrals ordered. Positive Risk Factor Screenings with Interventions:                      ADL's:   Patient reports needing help with:  Select all that apply: (!) Laundry, Housekeeping  Interventions:  Patient lives in assisted living facility.          Wt Readings from Last 3 Encounters:   07/27/23 164 lb (74.4 kg)   03/09/23 164 lb (74.4 kg)   01/30/23 165 lb (74.8 kg)                 Objective   Vitals:    07/27/23 1514 07/27/23 1527   BP: (!) 158/66 (!) 162/70   Site: Left Upper Arm Left Upper Arm   Position:

## 2023-08-30 LAB
ALBUMIN/GLOBULIN RATIO: 1.06 G/DL
ALBUMIN: 3.4 G/DL (ref 3.5–5)
ALP BLD-CCNC: 68 UNITS/L (ref 38–126)
ALT SERPL-CCNC: 19 UNITS/L (ref 4–35)
ANION GAP SERPL CALCULATED.3IONS-SCNC: 4 MMOL/L (ref 4–12)
AST SERPL-CCNC: 22 UNITS/L (ref 14–36)
BILIRUB SERPL-MCNC: 0.5 MG/DL (ref 0.2–1.3)
BUN BLDV-MCNC: 20 MG/DL (ref 7–17)
CALCIUM SERPL-MCNC: 9.1 MG/DL (ref 8.4–10.2)
CHLORIDE BLD-SCNC: 107 MMOL/L (ref 98–120)
CO2: 26 MMOL/L (ref 22–31)
CREAT SERPL-MCNC: 1 MG/DL (ref 0.5–1)
GFR CALCULATED: 55.2
GLOBULIN: 3.2 G/DL
GLUCOSE: 91 MG/DL (ref 65–105)
POTASSIUM SERPL-SCNC: 4 MMOL/L (ref 3.6–5)
SODIUM BLD-SCNC: 136 MMOL/L (ref 135–145)
TOTAL PROTEIN, SERUM: 6.6 G/DL (ref 6.3–8.2)

## 2024-01-29 ENCOUNTER — OFFICE VISIT (OUTPATIENT)
Dept: FAMILY MEDICINE CLINIC | Age: 89
End: 2024-01-29
Payer: MEDICARE

## 2024-01-29 VITALS
BODY MASS INDEX: 27.81 KG/M2 | DIASTOLIC BLOOD PRESSURE: 66 MMHG | OXYGEN SATURATION: 96 % | HEART RATE: 68 BPM | WEIGHT: 162 LBS | SYSTOLIC BLOOD PRESSURE: 124 MMHG

## 2024-01-29 DIAGNOSIS — R41.89 COGNITIVE DEFICITS: ICD-10-CM

## 2024-01-29 DIAGNOSIS — L98.9 SKIN LESION OF RIGHT ARM: ICD-10-CM

## 2024-01-29 DIAGNOSIS — Z85.44 HISTORY OF MALIGNANT NEOPLASM OF FALLOPIAN TUBE IN ADULTHOOD: ICD-10-CM

## 2024-01-29 DIAGNOSIS — I10 ESSENTIAL HYPERTENSION: ICD-10-CM

## 2024-01-29 DIAGNOSIS — R55 VASOVAGAL SYNCOPE: ICD-10-CM

## 2024-01-29 DIAGNOSIS — Z85.3 HISTORY OF BILATERAL BREAST CANCER: ICD-10-CM

## 2024-01-29 DIAGNOSIS — N18.32 STAGE 3B CHRONIC KIDNEY DISEASE (HCC): ICD-10-CM

## 2024-01-29 DIAGNOSIS — F51.01 PRIMARY INSOMNIA: ICD-10-CM

## 2024-01-29 DIAGNOSIS — R53.82 CHRONIC FATIGUE: ICD-10-CM

## 2024-01-29 DIAGNOSIS — G30.9 ALZHEIMER'S DISEASE, UNSPECIFIED (CODE) (HCC): Primary | ICD-10-CM

## 2024-01-29 PROBLEM — Z15.09 MALIGNANT NEOPLASM OF RIGHT FALLOPIAN TUBE WITH BRCA2 GENE MUTATION (HCC): Chronic | Status: RESOLVED | Noted: 2018-01-15 | Resolved: 2024-01-29

## 2024-01-29 PROBLEM — Z15.01 MALIGNANT NEOPLASM OF RIGHT FALLOPIAN TUBE WITH BRCA2 GENE MUTATION (HCC): Chronic | Status: RESOLVED | Noted: 2018-01-15 | Resolved: 2024-01-29

## 2024-01-29 PROBLEM — C57.01 MALIGNANT NEOPLASM OF RIGHT FALLOPIAN TUBE WITH BRCA2 GENE MUTATION (HCC): Chronic | Status: RESOLVED | Noted: 2018-01-15 | Resolved: 2024-01-29

## 2024-01-29 PROBLEM — Z15.02 MALIGNANT NEOPLASM OF RIGHT FALLOPIAN TUBE WITH BRCA2 GENE MUTATION (HCC): Chronic | Status: RESOLVED | Noted: 2018-01-15 | Resolved: 2024-01-29

## 2024-01-29 PROCEDURE — 99212 OFFICE O/P EST SF 10 MIN: CPT | Performed by: FAMILY MEDICINE

## 2024-01-29 RX ORDER — TORSEMIDE 5 MG/1
2.5 TABLET ORAL DAILY
Qty: 45 TABLET | Refills: 3 | Status: SHIPPED | OUTPATIENT
Start: 2024-01-29

## 2024-01-29 RX ORDER — DONEPEZIL HYDROCHLORIDE 23 MG/1
23 TABLET, FILM COATED ORAL NIGHTLY
Qty: 30 TABLET | Refills: 3 | Status: SHIPPED | OUTPATIENT
Start: 2024-01-29

## 2024-01-29 RX ORDER — DONEPEZIL HYDROCHLORIDE 10 MG/1
10 TABLET, FILM COATED ORAL NIGHTLY
Qty: 90 TABLET | Refills: 3 | Status: CANCELLED | OUTPATIENT
Start: 2024-01-29

## 2024-01-29 RX ORDER — ESCITALOPRAM OXALATE 10 MG/1
10 TABLET ORAL DAILY
Qty: 90 TABLET | Refills: 3 | Status: SHIPPED | OUTPATIENT
Start: 2024-01-29

## 2024-01-29 ASSESSMENT — PATIENT HEALTH QUESTIONNAIRE - PHQ9
SUM OF ALL RESPONSES TO PHQ QUESTIONS 1-9: 0
2. FEELING DOWN, DEPRESSED OR HOPELESS: 0
1. LITTLE INTEREST OR PLEASURE IN DOING THINGS: 0
SUM OF ALL RESPONSES TO PHQ9 QUESTIONS 1 & 2: 0

## 2024-01-29 NOTE — PROGRESS NOTES
ciclopirox (LOPROX) 0.77 % cream   0    Handicap Placard MISC by Does not apply route The disability is expected to last 5 years  Dx: cancer, pain 1 each 1    vitamin D (CHOLECALCIFEROL) 1000 UNIT TABS tablet Take 1 tablet by mouth daily       No current facility-administered medications for this visit.     Allergies   Allergen Reactions    Amoxicillin      Hives         Health Maintenance   Topic Date Due    Respiratory Syncytial Virus (RSV) Pregnant or age 60 yrs+ (1 - 1-dose 60+ series) Never done    COVID-19 Vaccine (4 - 2023-24 season) 09/01/2023    Annual Wellness Visit (Medicare)  07/27/2024    Depression Screen  01/29/2025    DTaP/Tdap/Td vaccine (2 - Td or Tdap) 01/08/2028    Flu vaccine  Completed    Shingles vaccine  Completed    Pneumococcal 65+ years Vaccine  Completed    Hepatitis A vaccine  Aged Out    Hepatitis B vaccine  Aged Out    Hib vaccine  Aged Out    Polio vaccine  Aged Out    Meningococcal (ACWY) vaccine  Aged Out         Review of Systems    Objective:     /66 (Site: Right Upper Arm, Position: Sitting, Cuff Size: Medium Adult)   Pulse 68   Wt 73.5 kg (162 lb)   LMP  (LMP Unknown)   SpO2 96%   BMI 27.81 kg/m²     Physical Exam  Vitals and nursing note reviewed.   Constitutional:       Appearance: Normal appearance. She is well-developed.   HENT:      Head: Normocephalic and atraumatic.      Right Ear: External ear normal.      Left Ear: External ear normal.   Eyes:      Extraocular Movements: Extraocular movements intact.      Conjunctiva/sclera: Conjunctivae normal.   Neck:      Thyroid: No thyromegaly.      Vascular: No JVD.   Cardiovascular:      Rate and Rhythm: Normal rate and regular rhythm.      Pulses: Normal pulses.      Heart sounds: Normal heart sounds. No murmur heard.     No friction rub. No gallop.   Pulmonary:      Effort: Pulmonary effort is normal. No respiratory distress.      Breath sounds: Normal breath sounds.   Musculoskeletal:      Cervical back: Normal

## 2024-01-29 NOTE — PATIENT INSTRUCTIONS
Increase the donepzil to the 20 mg once daily with 2 of the 190 mg tablets until the current bottle is gone then start on the 23 mg tablets daily.  Watch for upset stomach and possibly more dreams.    Schedule to have spot on the arm removed due to irritation.      Vasoline to the skin areas that open to moisturize and keep hands off.

## 2024-02-02 PROBLEM — C50.919 BREAST CANCER (HCC): Status: RESOLVED | Noted: 2017-06-14 | Resolved: 2024-02-02

## 2024-02-05 ENCOUNTER — PROCEDURE VISIT (OUTPATIENT)
Dept: FAMILY MEDICINE CLINIC | Age: 89
End: 2024-02-05
Payer: MEDICARE

## 2024-02-05 VITALS
SYSTOLIC BLOOD PRESSURE: 136 MMHG | WEIGHT: 160 LBS | HEART RATE: 62 BPM | OXYGEN SATURATION: 96 % | BODY MASS INDEX: 27.46 KG/M2 | DIASTOLIC BLOOD PRESSURE: 78 MMHG

## 2024-02-05 DIAGNOSIS — L98.9 SKIN LESION OF RIGHT ARM: Primary | ICD-10-CM

## 2024-02-05 PROCEDURE — 99212 OFFICE O/P EST SF 10 MIN: CPT | Performed by: FAMILY MEDICINE

## 2024-02-05 PROCEDURE — 11601 EXC TR-EXT MAL+MARG 0.6-1 CM: CPT | Performed by: FAMILY MEDICINE

## 2024-02-05 RX ORDER — LIDOCAINE HYDROCHLORIDE 10 MG/ML
1.5 INJECTION, SOLUTION EPIDURAL; INFILTRATION; INTRACAUDAL; PERINEURAL ONCE
Status: COMPLETED | OUTPATIENT
Start: 2024-02-05 | End: 2024-02-05

## 2024-02-05 RX ADMIN — LIDOCAINE HYDROCHLORIDE 1.5 ML: 10 INJECTION, SOLUTION EPIDURAL; INFILTRATION; INTRACAUDAL; PERINEURAL at 15:14

## 2024-02-05 NOTE — PROGRESS NOTES
[address], [address]  [place] [address]  Dept: [phone]  Dept Fax:[phone]    Celena Anderson is a 89 y.o. female who presents today for her medical conditions/complaints as notedbelow.  Celena Anderson is c/o of Procedure (Spot on right arm )        Assessment/Plan:     1. Skin lesion of right arm  -     lidocaine PF 1 % injection 1.5 mL; 1.5 mL, IntraDERmal, ONCE, 1 dose, On Mon 2/5/24 at 1515        Lab Results   Component Value Date    WBC 9.1 07/24/2023    HGB 13.1 07/24/2023    HCT 43.0 07/24/2023    .4 07/24/2023    ALT 19 08/30/2023    AST 22 08/30/2023     08/30/2023    K 4.0 08/30/2023     08/30/2023    CREATININE 1.0 08/30/2023    BUN 20 (H) 08/30/2023    CO2 26 08/30/2023    TSH 2.64 05/29/2018    INR 1.0 04/14/2018       Return if symptoms worsen or fail to improve.        Subjective:      HPI:     HPI    Has skin lesion on the right wrist.  Has been there for years but has been growing a little bit and is constantly being irritated with picking.  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

## 2024-02-09 LAB — SURGICAL PATHOLOGY REPORT: NORMAL

## 2024-02-15 ENCOUNTER — NURSE ONLY (OUTPATIENT)
Dept: FAMILY MEDICINE CLINIC | Age: 89
End: 2024-02-15

## 2024-02-15 NOTE — PROGRESS NOTES
Patient here for suture removal. 3 sutures visualized and removed without difficultly. Cannot see a 4th suture. Provider notified.

## 2024-02-27 ENCOUNTER — TELEPHONE (OUTPATIENT)
Dept: FAMILY MEDICINE CLINIC | Age: 89
End: 2024-02-27

## 2024-02-27 DIAGNOSIS — R40.4 TRANSIENT ALTERATION OF AWARENESS: ICD-10-CM

## 2024-02-27 DIAGNOSIS — R26.81 UNSTEADY GAIT: Primary | ICD-10-CM

## 2024-02-27 DIAGNOSIS — R29.6 FALLS FREQUENTLY: ICD-10-CM

## 2024-02-27 NOTE — TELEPHONE ENCOUNTER
Pt daughter called with some concerns, states pt is more off balance that usual and is foggier in her thoughts, asked daughter if she wanted to make an appt to see Dr Ponce, daughter states she just wants a call back, please advise.

## 2024-02-29 NOTE — TELEPHONE ENCOUNTER
LMOM.  Please try again and see if you Myriam has concerns.  Could check a urine and basic labs if nonspecific complaints.

## 2024-03-01 ENCOUNTER — TELEPHONE (OUTPATIENT)
Dept: FAMILY MEDICINE CLINIC | Age: 89
End: 2024-03-01

## 2024-03-01 NOTE — TELEPHONE ENCOUNTER
Spoke with Myriam around 8:00 last evening.  Just mention that over the last week to 10 days her mom has had more unsteadiness when she is visited her or if other family members have been visiting with her.  She did denies any falls but family went to see her and her glasses were bent and one of the lenses were missing apparently she \"bumped her glasses on the TV stand\" which could homely of really happened with the fall.  No other apparent injuries noted.  Would recommend physical therapy check of basic labs and UA to ensure no underlying infections.  If not improving with these interventions would consider possible addition of Namenda for mental status.    Please notify Enoch of these orders and ask for a set of orthostatic vitals.

## 2024-03-04 LAB
ALBUMIN/GLOBULIN RATIO: 1.2 G/DL
ALBUMIN: 3.7 G/DL (ref 3.5–5)
ALP BLD-CCNC: 76 UNITS/L (ref 38–126)
ALT SERPL-CCNC: 21 UNITS/L (ref 4–35)
ANION GAP SERPL CALCULATED.3IONS-SCNC: 0.7 MMOL/L (ref 3–11)
AST SERPL-CCNC: 40 UNITS/L (ref 14–36)
BACTERIA, URINE: ABNORMAL /HPF
BASOPHILS %: 0.8 (ref 0–3)
BASOPHILS ABSOLUTE: 0.07 (ref 0–0.3)
BILIRUB SERPL-MCNC: 0.6 MG/DL (ref 0.2–1.3)
BILIRUBIN URINE: NEGATIVE
BLOOD, URINE: NEGATIVE
BUN BLDV-MCNC: 23 MG/DL (ref 7–17)
CALCIUM SERPL-MCNC: 10 MG/DL (ref 8.4–10.2)
CASTS UA: ABNORMAL /LPF
CHLORIDE BLD-SCNC: 108 MMOL/L (ref 98–120)
CLARITY: CLEAR
CO2: 30 MMOL/L (ref 22–31)
COLOR, URINE: YELLOW
CREAT SERPL-MCNC: 1 MG/DL (ref 0.5–1)
CRYSTALS, UA: ABNORMAL
EOSINOPHILS %: 2.76 (ref 0–10)
EOSINOPHILS ABSOLUTE: 0.23 (ref 0–1.1)
GFR CALCULATED: 55.1
GLOBULIN: 3 G/DL
GLUCOSE URINE: NEGATIVE MG/DL
GLUCOSE: 91 MG/DL (ref 65–105)
HCT VFR BLD CALC: 42.8 % (ref 37–47)
HEMOGLOBIN: 13.2 (ref 12–16)
KETONES, URINE: NEGATIVE MG/DL
LEUKOCYTE ESTERASE, URINE: NEGATIVE
LYMPHOCYTE %: 25.04 (ref 20–51.1)
LYMPHOCYTES ABSOLUTE: 2.11 (ref 1–5.5)
MCH RBC QN AUTO: 28.5 PG (ref 28.5–32.5)
MCHC RBC AUTO-ENTMCNC: 30.8 G/DL (ref 32–37)
MCV RBC AUTO: 92.4 FL (ref 80–94)
MONOCYTES %: 11.14 (ref 1.7–9.3)
MONOCYTES ABSOLUTE: 0.94 (ref 0.1–1)
NEUTROPHILS %: 60.26 (ref 42.2–75.2)
NEUTROPHILS ABSOLUTE: 5.08 (ref 2–8.1)
NITRITE, URINE: NEGATIVE
PDW BLD-RTO: 13.2 % (ref 8.5–15.5)
PH UA: 6 (ref 5–8.5)
PLATELET # BLD: 177.8 THOU/MM3 (ref 130–400)
POTASSIUM SERPL-SCNC: 4 MMOL/L (ref 3.6–5)
PROTEIN UA: NEGATIVE MG/DL
RBC URINE: ABNORMAL /HPF (ref 0–2)
RBC: 4.63 M/UL (ref 4.2–5.4)
SODIUM BLD-SCNC: 139 MMOL/L (ref 135–145)
SPECIFIC GRAVITY, URINE: 1.02 MG/DL (ref 1–1.03)
SQUAMOUS EPITHELIAL: ABNORMAL /HPF
TOTAL PROTEIN, SERUM: 6.7 G/DL (ref 6.3–8.2)
UROBILINOGEN, URINE: 0.2 MG/DL (ref 0.2–1)
WBC URINE: ABNORMAL /HPF (ref 0–4)
WBC: 8.4 THOU/ML3 (ref 4.8–10.8)

## 2024-03-04 NOTE — TELEPHONE ENCOUNTER
Labs look good- urine is borderline-- Will await the culture results.  Please have them check her BP AT REST 2 x weekly over the next week and send me these results.

## 2024-03-11 ENCOUNTER — TELEPHONE (OUTPATIENT)
Dept: FAMILY MEDICINE CLINIC | Age: 89
End: 2024-03-11

## 2024-03-11 DIAGNOSIS — I10 PRIMARY HYPERTENSION: Primary | ICD-10-CM

## 2024-03-11 RX ORDER — LOSARTAN POTASSIUM 25 MG/1
25 TABLET ORAL DAILY
Qty: 30 TABLET | Refills: 1 | Status: SHIPPED | OUTPATIENT
Start: 2024-03-11 | End: 2024-03-13 | Stop reason: SDUPTHER

## 2024-03-11 NOTE — TELEPHONE ENCOUNTER
Daughter called stating Enoch told her that sussy's BP is running high. 177/??  Daughter states patient seems to be doing well although she does seem to have some increased confusion. Called [facility]-      Lunchtime BP readings  3/11/24  - 177/71  pulse 57  3/10/24 - 144/0   pulse  60  3/9/24  156/78  pulse  60  3/8/24 - 165/76  pulse  59  3/7/24 - 161/75  pulse  60  3/6/24 - 167/71  pulse  60      Evening BP readings  177/61  pulse  61  135/83  pulse 58  167/85  pulse 59  161/77  pulse 57  142/71  pulse 66

## 2024-03-11 NOTE — TELEPHONE ENCOUNTER
Would have Celena restart on the losartan she was taking previously-- only 25 mg daily.  Let me know in 2 weeks how she is doing on this with the blood pressures.

## 2024-03-12 ENCOUNTER — TELEPHONE (OUTPATIENT)
Dept: FAMILY MEDICINE CLINIC | Age: 89
End: 2024-03-12

## 2024-03-13 DIAGNOSIS — I10 PRIMARY HYPERTENSION: ICD-10-CM

## 2024-03-13 RX ORDER — LOSARTAN POTASSIUM 25 MG/1
25 TABLET ORAL DAILY
Qty: 90 TABLET | Refills: 3 | Status: SHIPPED | OUTPATIENT
Start: 2024-03-13

## 2024-03-13 NOTE — TELEPHONE ENCOUNTER
Celena Anderson is requesting a refill on the following medication(s):  Requested Prescriptions     Pending Prescriptions Disp Refills    losartan (COZAAR) 25 MG tablet 90 tablet 3     Sig: Take 1 tablet by mouth daily       Last Visit Date (If Applicable):  2/5/2024    Next Visit Date:    7/29/2024

## 2024-03-18 ENCOUNTER — TELEPHONE (OUTPATIENT)
Dept: FAMILY MEDICINE CLINIC | Age: 89
End: 2024-03-18

## 2024-03-21 ENCOUNTER — OFFICE VISIT (OUTPATIENT)
Dept: FAMILY MEDICINE CLINIC | Age: 89
End: 2024-03-21
Payer: MEDICARE

## 2024-03-21 VITALS
DIASTOLIC BLOOD PRESSURE: 66 MMHG | OXYGEN SATURATION: 95 % | BODY MASS INDEX: 27.46 KG/M2 | SYSTOLIC BLOOD PRESSURE: 152 MMHG | HEART RATE: 71 BPM | WEIGHT: 160 LBS

## 2024-03-21 DIAGNOSIS — H61.23 HEARING LOSS OF BOTH EARS DUE TO CERUMEN IMPACTION: Primary | ICD-10-CM

## 2024-03-21 PROCEDURE — 69210 REMOVE IMPACTED EAR WAX UNI: CPT | Performed by: FAMILY MEDICINE

## 2024-03-21 PROCEDURE — 99212 OFFICE O/P EST SF 10 MIN: CPT | Performed by: FAMILY MEDICINE

## 2024-03-21 SDOH — ECONOMIC STABILITY: FOOD INSECURITY: WITHIN THE PAST 12 MONTHS, THE FOOD YOU BOUGHT JUST DIDN'T LAST AND YOU DIDN'T HAVE MONEY TO GET MORE.: NEVER TRUE

## 2024-03-21 SDOH — ECONOMIC STABILITY: INCOME INSECURITY: HOW HARD IS IT FOR YOU TO PAY FOR THE VERY BASICS LIKE FOOD, HOUSING, MEDICAL CARE, AND HEATING?: NOT HARD AT ALL

## 2024-03-21 SDOH — ECONOMIC STABILITY: FOOD INSECURITY: WITHIN THE PAST 12 MONTHS, YOU WORRIED THAT YOUR FOOD WOULD RUN OUT BEFORE YOU GOT MONEY TO BUY MORE.: NEVER TRUE

## 2024-03-21 NOTE — PROGRESS NOTES
0.025 % ointment Apply topically 2 times daily as needed      denosumab (PROLIA) 60 MG/ML SOSY SC injection Inject 1 mL into the skin once for 1 dose 1 mL 0    ciclopirox (LOPROX) 0.77 % cream   0    Handicap Placard MISC by Does not apply route The disability is expected to last 5 years  Dx: cancer, pain 1 each 1     No current facility-administered medications for this visit.     Allergies   Allergen Reactions    Amoxicillin      Hives         Health Maintenance   Topic Date Due    Respiratory Syncytial Virus (RSV) Pregnant or age 60 yrs+ (1 - 1-dose 60+ series) Never done    COVID-19 Vaccine (4 - 2023-24 season) 09/01/2023    Annual Wellness Visit (Medicare)  07/27/2024    Depression Screen  01/29/2025    DTaP/Tdap/Td vaccine (2 - Td or Tdap) 01/08/2028    Flu vaccine  Completed    Shingles vaccine  Completed    Pneumococcal 65+ years Vaccine  Completed    Hepatitis A vaccine  Aged Out    Hepatitis B vaccine  Aged Out    Hib vaccine  Aged Out    Polio vaccine  Aged Out    Meningococcal (ACWY) vaccine  Aged Out         Review of Systems    Objective:     BP (!) 152/66 (Site: Left Upper Arm, Position: Sitting, Cuff Size: Medium Adult)   Pulse 71   Wt 72.6 kg (160 lb)   LMP  (LMP Unknown)   SpO2 95%   BMI 27.46 kg/m²     Physical Exam  Vitals and nursing note reviewed.   Constitutional:       Appearance: Normal appearance.   HENT:      Head: Normocephalic.      Right Ear: Tympanic membrane normal. There is impacted cerumen.      Left Ear: Tympanic membrane normal. There is impacted cerumen.      Ears:      Comments: Bilateral ear canal completely occluded with cerumen. Cleared to facilitate exam with curette.  Patient tolerated well. Right ear needed additional water irrigation    Neurological:      General: No focal deficit present.      Mental Status: She is alert.               Multiple labs and other testing may have been ordered which may not be completely evident from the above note due to system

## 2024-03-27 ENCOUNTER — TELEPHONE (OUTPATIENT)
Dept: FAMILY MEDICINE CLINIC | Age: 89
End: 2024-03-27

## 2024-03-27 DIAGNOSIS — I10 PRIMARY HYPERTENSION: ICD-10-CM

## 2024-03-28 RX ORDER — LOSARTAN POTASSIUM 25 MG/1
50 TABLET ORAL DAILY
Qty: 90 TABLET | Refills: 3
Start: 2024-03-28

## 2024-03-28 NOTE — TELEPHONE ENCOUNTER
BP's are still running significantly higher-- I would like her to increase the losartan to 50 mg once daily.  Recheck in 2 weeks.

## 2024-04-02 LAB
ALBUMIN/GLOBULIN RATIO: 1.1 G/DL
ALBUMIN: 3.5 G/DL (ref 3.5–5)
ALP BLD-CCNC: 84 UNITS/L (ref 38–126)
ALT SERPL-CCNC: 20 UNITS/L (ref 4–35)
ANION GAP SERPL CALCULATED.3IONS-SCNC: 5.4 MMOL/L (ref 3–11)
AST SERPL-CCNC: 28 UNITS/L (ref 14–36)
BASOPHILS %: 0.77 (ref 0–3)
BASOPHILS ABSOLUTE: 0.07 (ref 0–0.3)
BILIRUB SERPL-MCNC: 0.5 MG/DL (ref 0.2–1.3)
BUN BLDV-MCNC: 22 MG/DL (ref 7–17)
CALCIUM SERPL-MCNC: 9.2 MG/DL (ref 8.4–10.2)
CHLORIDE BLD-SCNC: 107 MMOL/L (ref 98–120)
CO2: 25 MMOL/L (ref 22–31)
CREAT SERPL-MCNC: 1.1 MG/DL (ref 0.5–1)
EOSINOPHILS %: 2.79 (ref 0–10)
EOSINOPHILS ABSOLUTE: 0.26 (ref 0–1.1)
GFR CALCULATED: 49.4
GLOBULIN: 3.1 G/DL
GLUCOSE: 91 MG/DL (ref 65–105)
HCT VFR BLD CALC: 40.8 % (ref 37–47)
HEMOGLOBIN: 13.1 (ref 12–16)
LYMPHOCYTE %: 22.81 (ref 20–51.1)
LYMPHOCYTES ABSOLUTE: 2.09 (ref 1–5.5)
MCH RBC QN AUTO: 29.8 PG (ref 28.5–32.5)
MCHC RBC AUTO-ENTMCNC: 32.1 G/DL (ref 32–37)
MCV RBC AUTO: 92.9 FL (ref 80–94)
MONOCYTES %: 10.67 (ref 1.7–9.3)
MONOCYTES ABSOLUTE: 0.98 (ref 0.1–1)
NEUTROPHILS %: 62.96 (ref 42.2–75.2)
NEUTROPHILS ABSOLUTE: 5.78 (ref 2–8.1)
PDW BLD-RTO: 12.6 % (ref 8.5–15.5)
PLATELET # BLD: 165.3 THOU/MM3 (ref 130–400)
POTASSIUM SERPL-SCNC: 4.1 MMOL/L (ref 3.6–5)
RBC: 4.39 M/UL (ref 4.2–5.4)
SODIUM BLD-SCNC: 137 MMOL/L (ref 135–145)
TOTAL PROTEIN, SERUM: 6.6 G/DL (ref 6.3–8.2)
WBC: 9.2 THOU/ML3 (ref 4.8–10.8)

## 2024-04-09 ENCOUNTER — TELEPHONE (OUTPATIENT)
Dept: FAMILY MEDICINE CLINIC | Age: 89
End: 2024-04-09

## 2024-04-10 NOTE — TELEPHONE ENCOUNTER
"Colonoscopy History and Physical    Patient Name: Jaleel Mccray  MRN: [id number]  : 1964  Date of Procedure:  2023  Referring Physician: Pino Anaya MD  Primary Physician: JUSTO Alexis  Procedure Physician: Pino Anaya MD    Procedure - Colonoscopy  ASA - per anesthesia  Mallampati - per anesthesia  History of Anesthesia problems - no  Family history Anesthesia problems -  no   Plan of anesthesia - General    Diagnosis: screening for colon cancer  Chief Complaint: Same as above    HPI: Patient is an 58 y.o. male is here for the above. He denies current symptoms of blood per rectum, abdominal pain, or recent weight loss. He does have a ventral hernia that is small and most noticeable when standing; it is mostly asymptomatic. He has never had a colonoscopy before. Says that his stool sample a few months ago was positive for blood. He denies a family history of colon cancer. Denies taking blood thinners    Last colonoscopy: Never   Family history:   Family History   Problem Relation Age of Onset    Prostate cancer Father     Cancer Sister      Anticoagulation: Denies    ROS:  Constitutional: No fevers, chills, No weight loss  CV: No chest pain  Pulm: No cough, No shortness of breath  GI: see HPI    Medical History:   Past Medical History:   Diagnosis Date    HLD (hyperlipidemia)     Hypertension          Surgical History:   History reviewed. No pertinent surgical history.    Family History:   Family History   Problem Relation Age of Onset    Prostate cancer Father     Cancer Sister    .    Social History:   Social History     Socioeconomic History    Marital status:    Tobacco Use    Smoking status: Former     Types: Cigarettes     Quit date: 10/9/2022     Years since quittin.2    Smokeless tobacco: Never   Substance and Sexual Activity    Alcohol use: Yes     Comment: 2 beer a day during a week/ 12 pack on a weekend    Drug use: Not Currently     Types: "Crack" cocaine, " Enoch notified.    "Marijuana     Comment: More than 20 years ago       Review of patient's allergies indicates:  No Known Allergies    Medications:   Medications Prior to Admission   Medication Sig Dispense Refill Last Dose    amLODIPine (NORVASC) 5 MG tablet Take 5 mg by mouth once daily.   1/12/2023 at 0630    atorvastatin (LIPITOR) 10 MG tablet Take 10 mg by mouth once daily.       losartan potassium, bulk, 100 % Powd by Misc.(Non-Drug; Combo Route) route.       polyethylene glycol (GOLYTELY) 236-22.74-6.74 -5.86 gram suspension Take as directed from instructions given by GI Lab. 1 each 0          Physical Exam:    Vital Signs:   Vitals:    01/12/23 0929   BP: (!) 122/94   Pulse: 60   Resp: 20   Temp: 98.1 °F (36.7 °C)     BP (!) 122/94 (BP Location: Left arm, Patient Position: Lying)   Pulse 60   Temp 98.1 °F (36.7 °C)   Resp 20   Ht 5' 6" (1.676 m)   Wt 95.9 kg (211 lb 6.7 oz)   SpO2 99%   BMI 34.12 kg/m²     Physical Exam  Constitutional:       Appearance: Normal appearance. He is obese.   Eyes:      Extraocular Movements: Extraocular movements intact.   Cardiovascular:      Rate and Rhythm: Normal rate.      Pulses: Normal pulses.   Pulmonary:      Effort: Pulmonary effort is normal.   Abdominal:      Palpations: Abdomen is soft.      Comments: + ventral hernia   Skin:     General: Skin is warm and dry.   Neurological:      Mental Status: He is alert and oriented to person, place, and time.   Psychiatric:         Behavior: Behavior normal.             Labs:  No results found for: WBC, HGB, HCT, MCV, PLT  No results found for: INR, PT, APTT  No results found for: NA, K, CO2, BUN, CREATININE, LABPROT, ALBUMIN, BILITOT, ALKPHOS, ALT, AST      Assessment and Plan:    History reviewed, vital signs satisfactory, cardiopulmonary status satisfactory.  I have explained the sedation options, risks, benefits, and alternatives of this endoscopic procedure to the patient including but not limited to bleeding, inflammation, infection, " perforation, and death.  All questions were answered and the patient consented to proceed with procedure as planned.   The patient is deemed an appropriate candidate for the sedation as planned.      Estee Riley MD  [employer] General Surgery, PGY2      1/12/2023  9:52 AM

## 2024-04-18 ENCOUNTER — TELEPHONE (OUTPATIENT)
Dept: FAMILY MEDICINE CLINIC | Age: 89
End: 2024-04-18

## 2024-04-18 NOTE — TELEPHONE ENCOUNTER
Daughter called in and stated Celena fell at the assisted living center but didn't tell anyone. Reports her toe being very bruised and a bad skin tear on arm. Wanted to be seen today if possible. Explain there was not an opening today but there was tomorrow. She stated she will utilize the urgent care today then just to make sure everything was ok.

## 2024-05-07 ENCOUNTER — TELEPHONE (OUTPATIENT)
Dept: FAMILY MEDICINE CLINIC | Age: 89
End: 2024-05-07

## 2024-05-07 DIAGNOSIS — R41.0 CONFUSION: Primary | ICD-10-CM

## 2024-05-07 NOTE — TELEPHONE ENCOUNTER
[facility] Nurse called and states that patient has been very confused and not her self. Enoch is wondering if they could get a UA on the patient.   Fax # [phone]

## 2024-05-08 LAB
BACTERIA, URINE: ABNORMAL /HPF
BILIRUBIN, URINE: NEGATIVE
BLOOD, URINE: NEGATIVE
CASTS UA: PRESENT /LPF
CLARITY: CLEAR
COLOR: YELLOW
CRYSTALS, UA: ABNORMAL
GLUCOSE URINE: NEGATIVE MG/DL
HYALINE CASTS: ABNORMAL /LPF
KETONES, URINE: NEGATIVE MG/DL
LEUKOCYTE ESTERASE, URINE: NEGATIVE
MUCUS, URINE: ABNORMAL
NITRITE, URINE: NEGATIVE
PH, URINE: 6 (ref 5–8.5)
PROTEIN UA: NEGATIVE MG/DL
RBC URINE: ABNORMAL /HPF (ref 0–2)
SPECIFIC GRAVITY UA: 1.01 MG/DL (ref 1–1.03)
SQUAMOUS EPITHELIAL: ABNORMAL /HPF
UROBILINOGEN, URINE: 0.2 MG/DL (ref 0.2–1)
WBC URINE: ABNORMAL /HPF (ref 0–4)

## 2024-05-09 ENCOUNTER — TELEPHONE (OUTPATIENT)
Dept: FAMILY MEDICINE CLINIC | Age: 89
End: 2024-05-09

## 2024-05-09 NOTE — TELEPHONE ENCOUNTER
Patients daughter Karma called and states that patient has not been acting the same. Karma states they did do a UA and that was normal but she is afraid that something else might be going on. Karma did have a voicemail from [facility] stating that they are having the patient use a walker now. I informed Karma that an appointment with  might be the best route but she is wondering if she could have some guidance first before making the appointment.

## 2024-05-10 ENCOUNTER — OFFICE VISIT (OUTPATIENT)
Dept: FAMILY MEDICINE CLINIC | Age: 89
End: 2024-05-10
Payer: MEDICARE

## 2024-05-10 VITALS
HEART RATE: 67 BPM | SYSTOLIC BLOOD PRESSURE: 132 MMHG | DIASTOLIC BLOOD PRESSURE: 70 MMHG | OXYGEN SATURATION: 96 % | BODY MASS INDEX: 26.78 KG/M2 | WEIGHT: 156 LBS

## 2024-05-10 DIAGNOSIS — I10 PRIMARY HYPERTENSION: ICD-10-CM

## 2024-05-10 DIAGNOSIS — F02.80 ALZHEIMER'S DISEASE (HCC): Primary | ICD-10-CM

## 2024-05-10 DIAGNOSIS — F51.01 PRIMARY INSOMNIA: ICD-10-CM

## 2024-05-10 DIAGNOSIS — G30.9 ALZHEIMER'S DISEASE (HCC): Primary | ICD-10-CM

## 2024-05-10 LAB
TSH REFLEX FT4: 2.31 MIU/ML (ref 0.49–4.67)
VITAMIN B-12: 457 PG/ML (ref 239–931)

## 2024-05-10 PROCEDURE — 99211 OFF/OP EST MAY X REQ PHY/QHP: CPT | Performed by: FAMILY MEDICINE

## 2024-05-10 RX ORDER — LANOLIN ALCOHOL/MO/W.PET/CERES
3 CREAM (GRAM) TOPICAL DAILY
COMMUNITY
End: 2024-05-10 | Stop reason: ALTCHOICE

## 2024-05-10 RX ORDER — MEMANTINE HYDROCHLORIDE 5 MG/1
5 TABLET ORAL 2 TIMES DAILY
Qty: 60 TABLET | Refills: 1 | Status: SHIPPED | OUTPATIENT
Start: 2024-05-10

## 2024-05-10 RX ORDER — ESCITALOPRAM OXALATE 10 MG/1
15 TABLET ORAL DAILY
Qty: 135 TABLET | Refills: 3 | Status: SHIPPED | OUTPATIENT
Start: 2024-05-10 | End: 2024-05-14 | Stop reason: SDUPTHER

## 2024-05-10 RX ORDER — LOSARTAN POTASSIUM 50 MG/1
50 TABLET ORAL DAILY
Qty: 90 TABLET | Refills: 3
Start: 2024-05-10

## 2024-05-10 NOTE — PROGRESS NOTES
above note due to system interface incompatibilities.     Patient given educational materials - see patientinstructions.  Discussed use, benefit, and side effects of prescribed medications.  All patient questions answered.  Pt voiced understanding. Reviewed health maintenance.  Instructed to continue current medications, diet andexercise.  Patient agreed with treatment plan. Follow up as directed.     (Please note that portions of this note were completed with a voice-recognition program. Efforts were made to edit the dictation but occasionally words are mis-transcribed.)    Electronically signed by Shonna Diehl MD on 5/19/2024

## 2024-05-13 ENCOUNTER — TELEPHONE (OUTPATIENT)
Dept: FAMILY MEDICINE CLINIC | Age: 89
End: 2024-05-13

## 2024-05-13 DIAGNOSIS — F51.01 PRIMARY INSOMNIA: ICD-10-CM

## 2024-05-13 NOTE — TELEPHONE ENCOUNTER
Received fax from [employer]- Escitalopram has 2 sets of directions. One daily and 1.5 tabs daily. Which is correct?

## 2024-05-14 RX ORDER — ESCITALOPRAM OXALATE 10 MG/1
15 TABLET ORAL DAILY
Qty: 135 TABLET | Refills: 3 | Status: SHIPPED | OUTPATIENT
Start: 2024-05-14

## 2024-05-16 ENCOUNTER — TELEPHONE (OUTPATIENT)
Dept: FAMILY MEDICINE CLINIC | Age: 89
End: 2024-05-16

## 2024-05-16 NOTE — TELEPHONE ENCOUNTER
Daughter planning on having her Mom move in with her.  (Currently in Assisted Living).  She just wonders if you have any concerns with that before she does it.

## 2024-05-20 ENCOUNTER — TELEPHONE (OUTPATIENT)
Dept: FAMILY MEDICINE CLINIC | Age: 89
End: 2024-05-20

## 2024-05-20 DIAGNOSIS — R29.6 FALLS FREQUENTLY: ICD-10-CM

## 2024-05-20 DIAGNOSIS — R26.81 UNSTEADY GAIT: Primary | ICD-10-CM

## 2024-06-03 DIAGNOSIS — G30.9 ALZHEIMER'S DISEASE, UNSPECIFIED (CODE) (HCC): ICD-10-CM

## 2024-06-03 NOTE — TELEPHONE ENCOUNTER
Celena Justin is requesting a refill on the following medication(s):  Requested Prescriptions     Pending Prescriptions Disp Refills    Donepezil HCl (ARICEPT) 23 MG TABS tablet [Pharmacy Med Name: DONEPEZIL HCL 23 MG TABLET] 90 tablet 1     Sig: take 1 tablet by mouth nightly       Last Visit Date (If Applicable):  5/10/2024    Next Visit Date:    7/29/2024

## 2024-06-04 RX ORDER — DONEPEZIL HYDROCHLORIDE 23 MG/1
23 TABLET, FILM COATED ORAL NIGHTLY
Qty: 90 TABLET | Refills: 1 | Status: SHIPPED | OUTPATIENT
Start: 2024-06-04

## 2024-06-17 ENCOUNTER — TELEPHONE (OUTPATIENT)
Dept: FAMILY MEDICINE CLINIC | Age: 89
End: 2024-06-17

## 2024-06-17 NOTE — TELEPHONE ENCOUNTER
Patient was discharged from [facility] 6/15.  Patient is settling in well at her daughter's home. She does have some confusion but seems to be adjusting. Daughter denies any needs at this time. Patient does have follow up appointment in July- daughter advised to give us a call sooner if needed.

## 2024-06-26 DIAGNOSIS — I10 PRIMARY HYPERTENSION: ICD-10-CM

## 2024-06-26 RX ORDER — LOSARTAN POTASSIUM 50 MG/1
50 TABLET ORAL DAILY
Qty: 90 TABLET | Refills: 3 | Status: SHIPPED | OUTPATIENT
Start: 2024-06-26

## 2024-06-26 NOTE — TELEPHONE ENCOUNTER
Celena Anderson is requesting a refill on the following medication(s):  Requested Prescriptions     Pending Prescriptions Disp Refills    losartan (COZAAR) 50 MG tablet 90 tablet 3     Sig: Take 1 tablet by mouth daily       Last Visit Date (If Applicable):  5/10/2024    Next Visit Date:    7/29/2024

## 2024-07-09 DIAGNOSIS — F02.80 ALZHEIMER'S DISEASE (HCC): ICD-10-CM

## 2024-07-09 DIAGNOSIS — G30.9 ALZHEIMER'S DISEASE (HCC): ICD-10-CM

## 2024-07-09 NOTE — TELEPHONE ENCOUNTER
Celena Anderson is requesting a refill on the following medication(s):  Requested Prescriptions     Pending Prescriptions Disp Refills    memantine (NAMENDA) 5 MG tablet [Pharmacy Med Name: MEMANTINE HCL 5 MG TABLET] 60 tablet 1     Sig: take 1 tablet by mouth twice a day       Last Visit Date (If Applicable):  5/10/2024    Next Visit Date:    7/29/2024

## 2024-07-10 RX ORDER — MEMANTINE HYDROCHLORIDE 5 MG/1
5 TABLET ORAL 2 TIMES DAILY
Qty: 60 TABLET | Refills: 1 | Status: SHIPPED | OUTPATIENT
Start: 2024-07-10

## 2024-07-14 DIAGNOSIS — F02.80 ALZHEIMER'S DISEASE (HCC): ICD-10-CM

## 2024-07-14 DIAGNOSIS — G30.9 ALZHEIMER'S DISEASE (HCC): ICD-10-CM

## 2024-07-15 RX ORDER — MEMANTINE HYDROCHLORIDE 5 MG/1
5 TABLET ORAL 2 TIMES DAILY
Qty: 180 TABLET | Refills: 1 | Status: SHIPPED | OUTPATIENT
Start: 2024-07-15

## 2024-07-15 NOTE — TELEPHONE ENCOUNTER
Celena Andersno is requesting a refill on the following medication(s):  Requested Prescriptions     Pending Prescriptions Disp Refills    memantine (NAMENDA) 5 MG tablet [Pharmacy Med Name: MEMANTINE HCL 5 MG TABLET] 180 tablet 1     Sig: take 1 tablet by mouth twice a day       Last Visit Date (If Applicable):  5/10/2024    Next Visit Date:    7/29/2024

## 2024-07-22 LAB
ANION GAP SERPL CALCULATED.3IONS-SCNC: 8.4 MMOL/L (ref 3–11)
BUN BLDV-MCNC: 29 MG/DL (ref 7–17)
CALCIUM SERPL-MCNC: 9.5 MG/DL (ref 8.4–10.2)
CHLORIDE BLD-SCNC: 98 MMOL/L (ref 98–120)
CO2: 28 MMOL/L (ref 22–31)
CREAT SERPL-MCNC: 0.9 MG/DL (ref 0.5–1)
CREATININE CLEARANCE: 33.6
GFR, ESTIMATED: > 60
GLUCOSE: 112 MG/DL (ref 65–105)
POTASSIUM SERPL-SCNC: 4 MMOL/L (ref 3.6–5)
SODIUM BLD-SCNC: 135 MMOL/L (ref 135–145)

## 2024-07-24 ENCOUNTER — TELEPHONE (OUTPATIENT)
Dept: FAMILY MEDICINE CLINIC | Age: 89
End: 2024-07-24

## 2024-07-25 ENCOUNTER — TELEPHONE (OUTPATIENT)
Dept: FAMILY MEDICINE CLINIC | Age: 89
End: 2024-07-25

## 2024-07-25 DIAGNOSIS — R19.7 DIARRHEA, UNSPECIFIED TYPE: Primary | ICD-10-CM

## 2024-07-25 NOTE — TELEPHONE ENCOUNTER
Daughter called stating patient is having diarrhea. It started Tuesday when she got home from the hospital. Daughter said that she has a good appetite and is eating well but as soon as she eats something she has to jump up to go to the bathroom.  Yesterday at lunch was the first time that she was not able to make it to the bathroom. She started her on a bland diet- Toast / jelly, Rice / applesauce and both meals she had to run to the bathroom. She does not have any other symptoms. No stomach upset or pain, nausea or vomiting, no fever.  She was d/c with norco but daughter has not been able to get it from the pharmacy yet. She has been using tylenol for pain. Not given any other new meds from the hospital.   Stool is not watery. It is mushy. It does have a strong odor. It is a large amount.  Not sure how many times a day patient is having diarrhea. She had a large loose BM at lunch and then again an hour later has another large loose stool.  Per Dr Diehl will check CDiff. Pepto bismol BID prn. Daughter will  supplies to get sample.

## 2024-07-27 LAB
C DIFF AG + TOXIN: NEGATIVE
SPECIMEN DESCRIPTION: NORMAL

## 2024-07-29 ENCOUNTER — OFFICE VISIT (OUTPATIENT)
Dept: FAMILY MEDICINE CLINIC | Age: 89
End: 2024-07-29

## 2024-07-29 VITALS
BODY MASS INDEX: 26.16 KG/M2 | DIASTOLIC BLOOD PRESSURE: 60 MMHG | OXYGEN SATURATION: 96 % | SYSTOLIC BLOOD PRESSURE: 124 MMHG | HEART RATE: 62 BPM | WEIGHT: 152.4 LBS

## 2024-07-29 DIAGNOSIS — Z09 HOSPITAL DISCHARGE FOLLOW-UP: Primary | ICD-10-CM

## 2024-07-29 DIAGNOSIS — S22.41XD CLOSED FRACTURE OF MULTIPLE RIBS OF RIGHT SIDE WITH ROUTINE HEALING, SUBSEQUENT ENCOUNTER: ICD-10-CM

## 2024-07-29 DIAGNOSIS — F02.80 ALZHEIMER'S DEMENTIA WITHOUT BEHAVIORAL DISTURBANCE (HCC): ICD-10-CM

## 2024-07-29 DIAGNOSIS — I10 PRIMARY HYPERTENSION: ICD-10-CM

## 2024-07-29 DIAGNOSIS — G30.9 ALZHEIMER'S DEMENTIA WITHOUT BEHAVIORAL DISTURBANCE (HCC): ICD-10-CM

## 2024-07-29 DIAGNOSIS — F42.4 EXCORIATION (SKIN-PICKING) DISORDER: ICD-10-CM

## 2024-07-29 RX ORDER — LOSARTAN POTASSIUM 50 MG/1
75 TABLET ORAL DAILY
Qty: 45 TABLET | Refills: 5 | Status: SHIPPED | OUTPATIENT
Start: 2024-07-29 | End: 2024-08-02 | Stop reason: SDUPTHER

## 2024-07-29 RX ORDER — GINSENG 100 MG
CAPSULE ORAL
Qty: 28 G | Refills: 3 | Status: SHIPPED | OUTPATIENT
Start: 2024-07-29 | End: 2024-08-08

## 2024-07-29 NOTE — PROGRESS NOTES
Post-Discharge Transitional Care Follow Up    Celena Anderson   YOB: 1932    Date of Office Visit:  7/29/2024  Date of Hospital Admission: 7/16/24  Date of Hospital Discharge: 7/23/2024    Care management risk score Rising risk (score 2-5) and Complex Care (Scores >=6): No Risk Score On File     Non face to face  following discharge, date last encounter closed (first attempt may have been earlier): 07/24/2024     Call initiated 2 business days of discharge: Yes    ASSESSMENT/PLAN:   Hospital discharge follow-up  -     [place] DISCHARGE MEDS RECONCILED W/ CURRENT OUTPATIENT MED LIST  Alzheimer's dementia without behavioral disturbance (HCC)  -     Handicap Placard MISC; Starting Mon 7/29/2024, Disp-1 each, R-0, PrintThe disability is expected to last 5 years Exp 7/29/2029 Dx: arthritis  Primary hypertension  Excoriation (skin-picking) disorder  -     bacitracin 500 UNIT/GM ointment; Twice daily as neeed, Disp-28 g, R-3, Normal  Closed fracture of multiple ribs of right side with routine healing, subsequent encounter    Continue with the supportive care with family- OT/ PT. VNS to check the BP as well as the family.  Will continue with the slightly higher dose of the losartan and will consider further adjustments based on the home pressure readings.     Continue with topical bactroban for the skin irritations.     Tylenol as needed for the pain control.    Medical Decision Making: moderate complexity  No follow-ups on file.           Subjective:   HPI    Inpatient course: Discharge summary reviewed- see chart.    Interval history/Current status: Did have some loose stools since the hospital stay-- has better-- had still been taking her miralax.. c diff neg.     PT was at the therapist, Nursing came on Thursday, OT today and PT has not started yet.  Did request and aide to help also.     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

## 2024-08-01 ENCOUNTER — TELEPHONE (OUTPATIENT)
Dept: FAMILY MEDICINE CLINIC | Age: 89
End: 2024-08-01

## 2024-08-01 DIAGNOSIS — I10 PRIMARY HYPERTENSION: ICD-10-CM

## 2024-08-02 ENCOUNTER — TELEPHONE (OUTPATIENT)
Dept: FAMILY MEDICINE CLINIC | Age: 89
End: 2024-08-02

## 2024-08-02 RX ORDER — LOSARTAN POTASSIUM 50 MG/1
50 TABLET ORAL 2 TIMES DAILY
Qty: 60 TABLET | Refills: 5 | Status: SHIPPED | OUTPATIENT
Start: 2024-08-02

## 2024-08-02 NOTE — TELEPHONE ENCOUNTER
Increase the losartan to twice daily and see how this goes.  New script sent to the [facility] outpatient pharmacy

## 2024-08-02 NOTE — TELEPHONE ENCOUNTER
Karma notified by phone. Patient currently takes 50 mg in the morning and 25 mg in the evening. Her BP at home last night was normal 120s. Daughter is concerned about increasing BP med if home BPs are normal. She is going to monitor BP over the weekend - advised to take it at different times throughout the day and write it down. Visiting nurse is going to house this weekend and she will record that reading also. She is going to call Monday with BP readings and decide at that time if the BP med should be increased.

## 2024-08-02 NOTE — TELEPHONE ENCOUNTER
Kevin from [facility] called, states pt was having a hard time filling her losartan due to insurance. I called pharmacy and informed them that it is a new dose for her, they are now filling script. Pt daughter informed.

## 2024-08-20 ENCOUNTER — TELEPHONE (OUTPATIENT)
Dept: FAMILY MEDICINE CLINIC | Age: 89
End: 2024-08-20

## 2024-08-20 DIAGNOSIS — L30.9 HAND DERMATITIS: ICD-10-CM

## 2024-08-20 RX ORDER — FLUTICASONE PROPIONATE 0.05 MG/G
OINTMENT TOPICAL 2 TIMES DAILY
Qty: 60 G | Refills: 3 | Status: SHIPPED | OUTPATIENT
Start: 2024-08-20

## 2024-08-20 NOTE — TELEPHONE ENCOUNTER
Celena Justin is requesting a refill on the following medication(s):  Requested Prescriptions     Pending Prescriptions Disp Refills    fluticasone (CUTIVATE) 0.005 % ointment 60 g 3     Sig: Apply topically 2 times daily Apply topically 2 times daily.       Last Visit Date (If Applicable):  7/29/2024    Next Visit Date:    11/20/2024

## 2024-08-20 NOTE — TELEPHONE ENCOUNTER
Fluocinonide ointment  (Lidex) .05% - needs a refill.  Uses for a spot on her foot bid.  Uses [facility].

## 2024-08-22 ENCOUNTER — TELEPHONE (OUTPATIENT)
Dept: FAMILY MEDICINE CLINIC | Age: 89
End: 2024-08-22

## 2024-08-22 DIAGNOSIS — G30.9 ALZHEIMER'S DISEASE (HCC): ICD-10-CM

## 2024-08-22 DIAGNOSIS — F02.80 ALZHEIMER'S DISEASE (HCC): ICD-10-CM

## 2024-08-22 DIAGNOSIS — F51.01 PRIMARY INSOMNIA: ICD-10-CM

## 2024-08-22 RX ORDER — MEMANTINE HYDROCHLORIDE 5 MG/1
5 TABLET ORAL 2 TIMES DAILY
Qty: 180 TABLET | Refills: 1 | Status: SHIPPED | OUTPATIENT
Start: 2024-08-22

## 2024-08-22 RX ORDER — ESCITALOPRAM OXALATE 10 MG/1
15 TABLET ORAL DAILY
Qty: 135 TABLET | Refills: 3 | Status: SHIPPED | OUTPATIENT
Start: 2024-08-22

## 2024-08-22 NOTE — TELEPHONE ENCOUNTER
Celena Justin is requesting a refill on the following medication(s):  Requested Prescriptions     Pending Prescriptions Disp Refills    escitalopram (LEXAPRO) 10 MG tablet 135 tablet 3     Sig: Take 1.5 tablets by mouth daily    memantine (NAMENDA) 5 MG tablet 180 tablet 1     Sig: Take 1 tablet by mouth 2 times daily       Last Visit Date (If Applicable):  7/29/2024    Next Visit Date:    11/20/2024

## 2024-08-26 ENCOUNTER — TELEPHONE (OUTPATIENT)
Dept: FAMILY MEDICINE CLINIC | Age: 89
End: 2024-08-26

## 2024-08-26 NOTE — TELEPHONE ENCOUNTER
Kalpesh at [facility] [phone] needs clarification on Losartan script was transferred from [facility] and there are 2 diff dosed.

## 2024-09-04 ENCOUNTER — TELEPHONE (OUTPATIENT)
Dept: FAMILY MEDICINE CLINIC | Age: 89
End: 2024-09-04

## 2024-09-04 NOTE — TELEPHONE ENCOUNTER
Kevin from [facility] called and informed us they will be discharging her on 9/11 she has met all the requirements and is doing great.

## 2024-09-09 DIAGNOSIS — R55 VASOVAGAL SYNCOPE: ICD-10-CM

## 2024-09-09 DIAGNOSIS — I10 ESSENTIAL HYPERTENSION: ICD-10-CM

## 2024-09-09 RX ORDER — TORSEMIDE 5 MG/1
2.5 TABLET ORAL DAILY
Qty: 45 TABLET | Refills: 3 | Status: SHIPPED | OUTPATIENT
Start: 2024-09-09

## 2024-09-17 LAB
CALCIUM SERPL-MCNC: 10.1 MG/DL (ref 8.4–10.2)
CREAT SERPL-MCNC: 1 MG/DL (ref 0.5–1)
GFR, ESTIMATED: 55.1

## 2024-09-30 ENCOUNTER — OFFICE VISIT (OUTPATIENT)
Dept: FAMILY MEDICINE CLINIC | Age: 89
End: 2024-09-30
Payer: MEDICARE

## 2024-09-30 VITALS
OXYGEN SATURATION: 97 % | HEIGHT: 64 IN | SYSTOLIC BLOOD PRESSURE: 138 MMHG | BODY MASS INDEX: 25.1 KG/M2 | DIASTOLIC BLOOD PRESSURE: 66 MMHG | HEART RATE: 60 BPM | WEIGHT: 147 LBS

## 2024-09-30 DIAGNOSIS — H61.21 IMPACTED CERUMEN OF RIGHT EAR: Primary | ICD-10-CM

## 2024-09-30 PROCEDURE — 99212 OFFICE O/P EST SF 10 MIN: CPT | Performed by: STUDENT IN AN ORGANIZED HEALTH CARE EDUCATION/TRAINING PROGRAM

## 2024-09-30 NOTE — PROGRESS NOTES
[address], [address]  [place] [address]  Phone: [phone]  Fax: [phone]      Date of Visit:  24  Patient Name: Celena Anderson   Patient :  1932     ASSESSMENT/PLAN     1. Impacted cerumen of right ear  -     carbamide peroxide (DEBROX) 6.5 % otic solution; Place 5 drops in ear(s) 2 times daily for 4 days, Disp-15 mL, R-0Normal     Ear irrigation attempted today, small amount of wax removed, unable to entirely clear right ear canal.  Will prescribe Debrox, 5 drops twice daily for 4 days and return to office.  Before this, patient's daughter will bring her to hearing aid center and see if adjustment can improve hearing.    - Questions/concerns answered. Patient verbalized and expressed understanding. Medications, laboratory testing, imaging, consultation, and follow up as documented in this encounter.       HPI:     Celena Anderson is a 89 y.o. 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 They think her ears need to be cleaned out.        HPI: Patient presents due to decreased hearing.  Family has noticed slightly decreased hearing.  Patient does wear hearing aids, over last adjusted 1 year ago.      No dizziness or lightheadedness.  No ear ringing.    REVIEW OF SYSTEM      As in HPI    REVIEWED INFORMATION      Current Outpatient Medications   Medication Sig Dispense Refill    carbamide peroxide (DEBROX) 6.5 % otic solution Place 5 drops in ear(s) 2 times daily for 4 days 15 mL 0    torsemide (DEMADEX) 5 MG tablet Take 0.5 tablets by mouth daily 45 tablet 3    escitalopram (LEXAPRO) 10 MG

## 2024-10-21 NOTE — TELEPHONE ENCOUNTER
Care Transitions Initial Follow Up Call    Outreach made within 2 business days of discharge: Yes    Patient: Celena Anderson Patient : 1932   MRN: [id number]  Reason for Admission: fall, rib fracture  Discharge Date: 24      Spoke with: daughter    Discharge department/facility: formerly [facility]    TCM Interactive Patient Contact:  Was patient able to fill all prescriptions: Yes  Was patient instructed to bring all medications to the follow-up visit: Yes  Is patient taking all medications as directed in the discharge summary? Yes  Does patient understand their discharge instructions: Yes  Does patient have questions or concerns that need addressed prior to 7-14 day follow up office visit: no    Additional needs identified to be addressed with provider  Daughter states she is doing pretty good. She dropped her phone and bent over to pick it up which caused her a lot of pain. Daughter gave her tylenol last night. She is going to  norco today.  Advised to limit acetaminophen to no more than 3000mg in day. She is using her walker when she walks but does not always wait for someone to be present when she gets up and walks. Denies any questions or concerns at this time.              Scheduled appointment with PCP within 7-14 days    Follow Up  Future Appointments   Date Time Provider Department Center   2024  1:40 PM Shonna Diehl MD [facility] JOE Smith LPN  
Noted, thanks  
37.1

## 2024-11-20 ENCOUNTER — OFFICE VISIT (OUTPATIENT)
Dept: FAMILY MEDICINE CLINIC | Age: 89
End: 2024-11-20

## 2024-11-20 VITALS
SYSTOLIC BLOOD PRESSURE: 134 MMHG | BODY MASS INDEX: 25.1 KG/M2 | OXYGEN SATURATION: 99 % | DIASTOLIC BLOOD PRESSURE: 80 MMHG | WEIGHT: 147 LBS | HEIGHT: 64 IN | HEART RATE: 54 BPM

## 2024-11-20 DIAGNOSIS — G30.9 ALZHEIMER'S DISEASE, UNSPECIFIED (CODE) (HCC): ICD-10-CM

## 2024-11-20 DIAGNOSIS — Z23 NEEDS FLU SHOT: ICD-10-CM

## 2024-11-20 DIAGNOSIS — I10 PRIMARY HYPERTENSION: ICD-10-CM

## 2024-11-20 DIAGNOSIS — Z00.00 MEDICARE ANNUAL WELLNESS VISIT, SUBSEQUENT: Primary | ICD-10-CM

## 2024-11-20 RX ORDER — LOSARTAN POTASSIUM 50 MG/1
TABLET ORAL
Qty: 60 TABLET | Refills: 5 | Status: SHIPPED | OUTPATIENT
Start: 2024-11-20 | End: 2024-11-20

## 2024-11-20 RX ORDER — LOSARTAN POTASSIUM 50 MG/1
TABLET ORAL
Qty: 60 TABLET | Refills: 5 | Status: SHIPPED | OUTPATIENT
Start: 2024-11-20

## 2024-11-20 ASSESSMENT — PATIENT HEALTH QUESTIONNAIRE - PHQ9
1. LITTLE INTEREST OR PLEASURE IN DOING THINGS: NOT AT ALL
SUM OF ALL RESPONSES TO PHQ QUESTIONS 1-9: 0
2. FEELING DOWN, DEPRESSED OR HOPELESS: NOT AT ALL
SUM OF ALL RESPONSES TO PHQ QUESTIONS 1-9: 0
SUM OF ALL RESPONSES TO PHQ9 QUESTIONS 1 & 2: 0
SUM OF ALL RESPONSES TO PHQ QUESTIONS 1-9: 0
SUM OF ALL RESPONSES TO PHQ QUESTIONS 1-9: 0

## 2024-11-20 ASSESSMENT — LIFESTYLE VARIABLES
HOW OFTEN DO YOU HAVE A DRINK CONTAINING ALCOHOL: MONTHLY OR LESS
HOW MANY STANDARD DRINKS CONTAINING ALCOHOL DO YOU HAVE ON A TYPICAL DAY: 1 OR 2

## 2024-11-20 NOTE — PROGRESS NOTES
Medicare Annual Wellness Visit    Celena Anderson is here for Medicare AW and Discuss Medications (Pts daughter would like to go over all of patients medication to make sure she is taking the correct doses)    Assessment & Plan  1. Hypertension.  Her blood pressure is well-controlled today at 134/80. She is currently taking losartan 50 mg in the morning and 25 mg at night. The prescription for losartan 50 mg tablets will be continued, and she will continue to cut the tablets in half for the evening dose. The prescription will be sent to [facility].    2. Bowel irregularity.  She reports having bowel movements every day, with stools being half solid and half soft. She has had only one accident recently. She does not currently take any stool softeners or fiber supplements. Metamucil was recommended to help regulate bowel movements, along with increased fluid intake of at least 2-3 bottles of water daily.    3. Skin lesion.  The spot on her head appears to be healing well. She is advised to avoid scratching the area to prevent further irritation.    4. Exercise.  She is advised to continue regular exercise, including walking in the driveway three to four times a week, and to find alternative indoor walking spaces during cold weather to maintain mobility and reduce the risk of falls.    5. Hearing aids.  She reports that her hearing aids are working well and she uses them regularly.    6. Health Maintenance.  She received her flu shot. The RSV vaccine was recommended and can be obtained at the health department or [facility]. Blood work will be conducted in the spring when her Prolia injection is due in March. The order for blood work will be placed today.    Follow-up  Return in 6 months for follow up.    Medicare annual wellness visit, subsequent  Needs flu shot  -     Influenza, FLUAD Trivalent, (age 65 y+), IM, Preservative Free, 0.5mL  Primary hypertension  -     losartan (COZAAR) 50 MG tablet; Take 1 tablet by mouth

## 2024-11-20 NOTE — PROGRESS NOTES
Have you had an allergic reaction to the flu (influenza) shot? no  Are you allergic to eggs or any component of the flu vaccine? no  Do you have a history of Guillain-Moulton Syndrome (GBS), which is paralysis after receiving the flu vaccine? no  Are you feeling well today? yes  Flu vaccine given as ordered.  Patient tolerated it well.  No questions re: VIS information.    After obtaining consent, and per orders of Shonna Diehl MD, injection of FLU VACCINE given in Left deltoid by Avila David MA. Patient tolerated well.  Patient instructed to remain in clinic for 20 minutes afterwards, and to report any adverse reaction immediately.

## 2024-12-07 DIAGNOSIS — G30.9 ALZHEIMER'S DISEASE, UNSPECIFIED (CODE) (HCC): ICD-10-CM

## 2024-12-09 RX ORDER — DONEPEZIL HYDROCHLORIDE 23 MG/1
23 TABLET, FILM COATED ORAL NIGHTLY
Qty: 90 TABLET | Refills: 1 | Status: SHIPPED | OUTPATIENT
Start: 2024-12-09

## 2024-12-09 NOTE — TELEPHONE ENCOUNTER
Celean Justin is requesting a refill on the following medication(s):  Requested Prescriptions     Pending Prescriptions Disp Refills    Donepezil HCl (ARICEPT) 23 MG TABS tablet [Pharmacy Med Name: Donepezil HCl 23 MG Oral Tablet] 90 tablet 1     Sig: Take 1 tablet by mouth nightly       Last Visit Date (If Applicable):  11/20/2024    Next Visit Date:    5/21/2025

## 2025-01-03 LAB
FLU A ANTIGEN: NEGATIVE
FLU B ANTIGEN: NEGATIVE

## 2025-01-05 LAB
AMYLASE: 67 UNITS/L (ref 31–110)
LIPASE: 48 UNITS/L (ref 23–300)

## 2025-01-10 LAB
ALBUMIN/GLOBULIN RATIO: 1.19 G/DL
ALBUMIN: 3.1 G/DL (ref 3.5–5)
ALP BLD-CCNC: 82 UNITS/L (ref 38–126)
ALT SERPL-CCNC: 40 UNITS/L (ref 4–35)
ANION GAP SERPL CALCULATED.3IONS-SCNC: 1.9 MMOL/L (ref 3–11)
AST SERPL-CCNC: 57 UNITS/L (ref 14–36)
BASOPHILS ABSOLUTE: 0.14 X10E3/?L (ref 0–0.3)
BASOPHILS RELATIVE PERCENT: 1.66 % (ref 0–3)
BILIRUB SERPL-MCNC: 0.5 MG/DL (ref 0.2–1.3)
BUN BLDV-MCNC: 19 MG/DL (ref 7–17)
CALCIUM SERPL-MCNC: 8.7 MG/DL (ref 8.4–10.2)
CHLORIDE BLD-SCNC: 109 MMOL/L (ref 98–120)
CO2: 24 MMOL/L (ref 22–31)
CREAT SERPL-MCNC: 1 MG/DL (ref 0.5–1)
CREATININE CLEARANCE: 30.35
EOSINOPHILS ABSOLUTE: 0.12 X10E3/?L (ref 0–1.1)
EOSINOPHILS RELATIVE PERCENT: 1.34 % (ref 0–10)
GFR, ESTIMATED: 55
GLOBULIN: 2.6 G/DL
GLUCOSE: 101 MG/DL (ref 65–105)
HCT VFR BLD CALC: 41.2 % (ref 37–47)
HEMOGLOBIN: 12.9 G/DL (ref 12–16)
LYMPHOCYTES ABSOLUTE: 2.09 X10E3/?L (ref 1–5.5)
LYMPHOCYTES RELATIVE PERCENT: 23.9 % (ref 20–51.1)
MCH RBC QN AUTO: 29.5 PG (ref 28.5–32.5)
MCHC RBC AUTO-ENTMCNC: 31.3 G/DL (ref 32–37)
MCV RBC AUTO: 94.3 FL (ref 80–94)
MONOCYTES ABSOLUTE: 1.12 X10E3/?L (ref 0.1–1)
MONOCYTES RELATIVE PERCENT: 12.84 % (ref 1.7–9.3)
NEUTROPHILS ABSOLUTE: 5.26 X10E3/?L (ref 2–8.1)
NEUTROPHILS RELATIVE PERCENT: 60.26 % (ref 42.2–75.2)
PDW BLD-RTO: 12.6 % (ref 8.5–15.5)
PLATELET # BLD: 158.4 THOU/MM3 (ref 130–400)
POTASSIUM SERPL-SCNC: 4 MMOL/L (ref 3.6–5)
RBC # BLD: 4.37 M/UL (ref 4.2–5.4)
SODIUM BLD-SCNC: 135 MMOL/L (ref 135–145)
TOTAL PROTEIN: 5.7 G/DL (ref 6.3–8.2)
WBC # BLD: 8.7 THOU/ML3 (ref 4.8–10.8)

## 2025-01-20 ENCOUNTER — TELEPHONE (OUTPATIENT)
Dept: FAMILY MEDICINE CLINIC | Age: 89
End: 2025-01-20

## 2025-01-20 NOTE — TELEPHONE ENCOUNTER
Patient was d/c to [facility]. She will transition to assisted living in the future and they will let us know when she moves.

## 2025-02-04 ENCOUNTER — TELEPHONE (OUTPATIENT)
Dept: FAMILY MEDICINE CLINIC | Age: 89
End: 2025-02-04

## 2025-02-04 NOTE — TELEPHONE ENCOUNTER
Patient moved to [facility] assisted living [facility] 2/3. WIll need TCM call and appointment

## 2025-02-05 NOTE — TELEPHONE ENCOUNTER
Care Transitions Initial Follow Up Call    Outreach made within 2 business days of discharge: Yes    Patient: Celena Anderson Patient : 1932   MRN: [id number]  Reason for Admission: nausea/vomiting, SBO  Discharge Date: 2/3/25       Spoke with: leticia daughter    Discharge department/facility: [facility] 2/3 - currently at [facility] Interactive Patient Contact:  Was patient able to fill all prescriptions: Yes  Was patient instructed to bring all medications to the follow-up visit: Yes  Is patient taking all medications as directed in the discharge summary? Yes  Does patient understand their discharge instructions: Yes  Does patient have questions or concerns that need addressed prior to 7-14 day follow up office visit: no    Additional needs identified to be addressed with provider  Daughter wanted Dr to know that she is having [facility] all patient's med set up. All refills will go through their pharmacy now.              Scheduled appointment with PCP within 7-14 days    Follow Up  Future Appointments   Date Time Provider Department Center   2025  1:40 PM Shonna Diehl MD [facility] KANE Smith LPN

## 2025-02-12 ENCOUNTER — OFFICE VISIT (OUTPATIENT)
Dept: FAMILY MEDICINE CLINIC | Age: 89
End: 2025-02-12

## 2025-02-12 VITALS
OXYGEN SATURATION: 99 % | HEART RATE: 59 BPM | DIASTOLIC BLOOD PRESSURE: 60 MMHG | BODY MASS INDEX: 23.86 KG/M2 | SYSTOLIC BLOOD PRESSURE: 162 MMHG | WEIGHT: 139 LBS

## 2025-02-12 DIAGNOSIS — N18.32 STAGE 3B CHRONIC KIDNEY DISEASE (HCC): ICD-10-CM

## 2025-02-12 DIAGNOSIS — F02.A4 MILD LATE ONSET ALZHEIMER'S DEMENTIA WITH ANXIETY (HCC): ICD-10-CM

## 2025-02-12 DIAGNOSIS — Z09 HOSPITAL DISCHARGE FOLLOW-UP: Primary | ICD-10-CM

## 2025-02-12 DIAGNOSIS — I10 PRIMARY HYPERTENSION: ICD-10-CM

## 2025-02-12 DIAGNOSIS — G30.1 MILD LATE ONSET ALZHEIMER'S DEMENTIA WITH ANXIETY (HCC): ICD-10-CM

## 2025-02-12 RX ORDER — CALCIUM CARBONATE 500 MG/1
1 TABLET, CHEWABLE ORAL PRN
COMMUNITY

## 2025-02-12 RX ORDER — MAGNESIUM HYDROXIDE/ALUMINUM HYDROXICE/SIMETHICONE 120; 1200; 1200 MG/30ML; MG/30ML; MG/30ML
5 SUSPENSION ORAL EVERY 6 HOURS PRN
COMMUNITY

## 2025-02-12 RX ORDER — FAMOTIDINE 20 MG/1
20 TABLET, FILM COATED ORAL DAILY
COMMUNITY

## 2025-02-12 RX ORDER — LOSARTAN POTASSIUM 25 MG/1
50 TABLET ORAL
COMMUNITY
End: 2025-02-12 | Stop reason: ALTCHOICE

## 2025-02-12 RX ORDER — DOCUSATE SODIUM 100 MG/1
100 CAPSULE, LIQUID FILLED ORAL DAILY
COMMUNITY

## 2025-02-12 RX ORDER — HYDRALAZINE HYDROCHLORIDE 10 MG/1
10 TABLET, FILM COATED ORAL 3 TIMES DAILY PRN
COMMUNITY

## 2025-02-12 RX ORDER — LOSARTAN POTASSIUM 50 MG/1
50 TABLET ORAL 2 TIMES DAILY
Qty: 60 TABLET | Refills: 5 | Status: SHIPPED | OUTPATIENT
Start: 2025-02-12

## 2025-02-12 SDOH — ECONOMIC STABILITY: FOOD INSECURITY: WITHIN THE PAST 12 MONTHS, THE FOOD YOU BOUGHT JUST DIDN'T LAST AND YOU DIDN'T HAVE MONEY TO GET MORE.: NEVER TRUE

## 2025-02-12 SDOH — ECONOMIC STABILITY: FOOD INSECURITY: WITHIN THE PAST 12 MONTHS, YOU WORRIED THAT YOUR FOOD WOULD RUN OUT BEFORE YOU GOT MONEY TO BUY MORE.: NEVER TRUE

## 2025-02-12 ASSESSMENT — PATIENT HEALTH QUESTIONNAIRE - PHQ9
SUM OF ALL RESPONSES TO PHQ QUESTIONS 1-9: 0
SUM OF ALL RESPONSES TO PHQ9 QUESTIONS 1 & 2: 0
SUM OF ALL RESPONSES TO PHQ QUESTIONS 1-9: 0
2. FEELING DOWN, DEPRESSED OR HOPELESS: NOT AT ALL
SUM OF ALL RESPONSES TO PHQ QUESTIONS 1-9: 0
SUM OF ALL RESPONSES TO PHQ QUESTIONS 1-9: 0
1. LITTLE INTEREST OR PLEASURE IN DOING THINGS: NOT AT ALL

## 2025-02-12 NOTE — PROGRESS NOTES
Post-Discharge Transitional Care Follow Up    Celena Anderson   YOB: 1932    Date of Office Visit:  2/12/2025  Date of Hospital Admission: 1/30/25  Date of Hospital Discharge: 2/3/2025    Care management risk score Rising risk (score 2-5) and Complex Care (Scores >=6): No Risk Score On File     Non face to face  following discharge, date last encounter closed (first attempt may have been earlier): 02/04/2025     Call initiated 2 business days of discharge: Yes    Assessment & Plan  1. Blood pressure management.  Her blood pressure is slightly elevated today. She is on hydralazine 10 mg twice a day for blood pressure over 160, which was started in the hospital on an as-needed basis. It appears she has not needed it regularly. The dosage of losartan will be increased to 50 mg twice daily. Regular blood pressure monitoring will be conducted at the assisted living facility, and reports will be faxed over. A referral for home health services has been made.    2. Foot care.  Her feet are currently in good condition. She is advised to continue using ciclopirox 0.77% cream daily. If any skin issues arise, the use of the cream should be resumed immediately. The assisted living facility will be asked to remind her to apply the cream.    Follow-up  The patient will follow up in 3 months.      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           Subjective:   HPI    Inpatient course: Discharge summary reviewed- see chart.    Interval history/Current status:   History of Present Illness  The patient presents for evaluation of blood

## 2025-02-19 ENCOUNTER — TELEPHONE (OUTPATIENT)
Dept: FAMILY MEDICINE CLINIC | Age: 89
End: 2025-02-19

## 2025-03-13 LAB
CALCIUM SERPL-MCNC: 10 MG/DL (ref 8.4–10.2)
CREAT SERPL-MCNC: 0.9 MG/DL (ref 0.5–1)
GFR, ESTIMATED: > 60

## 2025-05-19 ENCOUNTER — OFFICE VISIT (OUTPATIENT)
Dept: FAMILY MEDICINE CLINIC | Age: 89
End: 2025-05-19
Payer: MEDICARE

## 2025-05-19 VITALS
WEIGHT: 143 LBS | HEART RATE: 65 BPM | SYSTOLIC BLOOD PRESSURE: 130 MMHG | DIASTOLIC BLOOD PRESSURE: 74 MMHG | BODY MASS INDEX: 24.55 KG/M2 | OXYGEN SATURATION: 98 %

## 2025-05-19 DIAGNOSIS — I15.9 SECONDARY HYPERTENSION: Primary | ICD-10-CM

## 2025-05-19 DIAGNOSIS — L98.9 SKIN LESION: ICD-10-CM

## 2025-05-19 DIAGNOSIS — R79.89 ELEVATED LFTS: ICD-10-CM

## 2025-05-19 LAB
ALBUMIN/GLOBULIN RATIO: 1.1 G/DL
ALBUMIN: 3.9 G/DL (ref 3.5–5)
ALP BLD-CCNC: 101 UNITS/L (ref 38–126)
ALT SERPL-CCNC: 17 UNITS/L (ref 4–35)
ANION GAP SERPL CALCULATED.3IONS-SCNC: 7.7 MMOL/L (ref 3–11)
AST SERPL-CCNC: 27 UNITS/L (ref 14–36)
BILIRUB SERPL-MCNC: 0.4 MG/DL (ref 0.2–1.3)
BUN BLDV-MCNC: 21 MG/DL (ref 7–17)
CALCIUM SERPL-MCNC: 9.5 MG/DL (ref 8.4–10.2)
CHLORIDE BLD-SCNC: 103 MMOL/L (ref 98–120)
CO2: 27 MMOL/L (ref 22–31)
CREAT SERPL-MCNC: 1 MG/DL (ref 0.5–1)
GFR, ESTIMATED: 55
GLOBULIN: 3.5 G/DL
GLUCOSE: 67 MG/DL (ref 65–105)
POTASSIUM SERPL-SCNC: 4.3 MMOL/L (ref 3.6–5)
SODIUM BLD-SCNC: 138 MMOL/L (ref 135–145)
TOTAL PROTEIN: 7.4 G/DL (ref 6.3–8.2)

## 2025-05-19 PROCEDURE — 99212 OFFICE O/P EST SF 10 MIN: CPT | Performed by: FAMILY MEDICINE

## 2025-05-19 RX ORDER — TRIAMCINOLONE ACETONIDE 0.25 MG/G
OINTMENT TOPICAL
Qty: 80 G | Refills: 0 | Status: SHIPPED | OUTPATIENT
Start: 2025-05-19 | End: 2025-05-26

## 2025-05-26 ENCOUNTER — RESULTS FOLLOW-UP (OUTPATIENT)
Dept: FAMILY MEDICINE CLINIC | Age: 89
End: 2025-05-26

## 2025-05-26 NOTE — RESULT ENCOUNTER NOTE
Notify family the labs are completely normal.  The BUN is slightly elevated but not worrisome.  Liver tests back to normal.

## (undated) DEVICE — SOLUTION SURG PREP POV IOD 7.5% 4 OZ

## (undated) DEVICE — GLOVE SURG SZ 6 THK91MIL LTX FREE SYN POLYISOPRENE ANTI

## (undated) DEVICE — Z DISCONTINUED USE 2423037 APPLICATOR MEDICATED 3 CC CHLORHEXIDINE GLUC 2% CHLORAPREP

## (undated) DEVICE — GLOVE SURG SZ 65 THK91MIL LTX FREE SYN POLYISOPRENE

## (undated) DEVICE — CYSTO/BLADDER IRRIGATION SET, REGULATING CLAMP

## (undated) DEVICE — SUTURE PERMA-HAND SZ 2-0 L30IN NONABSORBABLE BLK L26MM SH K833H

## (undated) DEVICE — DRESSING TRNSPAR W5XL4.5IN FLM SHT SEMIPERMEABLE WIND

## (undated) DEVICE — 3 ML SYRINGE LUER-LOCK TIP: Brand: MONOJECT

## (undated) DEVICE — DRAPE IRRIG FLD WRM W44XL66IN W/ AORN STD PRTBL INTRATEMP

## (undated) DEVICE — SUTURE PDS II SZ 1 L96IN ABSRB VLT TP-1 L65MM 1/2 CIR Z880G

## (undated) DEVICE — GARMENT COMPR STD FOR 17IN CALF UNIF THER FLOTRN

## (undated) DEVICE — LIGHT SUCT UNTETHERED SCINTILLANT

## (undated) DEVICE — SUTURE VCRL SZ 2-0 L27IN ABSRB UD L26MM SH 1/2 CIR J417H

## (undated) DEVICE — LEGGINGS, PAIR, 31X48, STERILE: Brand: MEDLINE

## (undated) DEVICE — GOWN,AURORA,NONREINFORCED,LARGE: Brand: MEDLINE

## (undated) DEVICE — DRAIN CHN 19FR L0.25IN DIA6.3MM SIL RND HUBLESS FULL FLUT

## (undated) DEVICE — CANNULA IV 18GA L15IN BLNT FILL LUERLOCK HUB MJCT

## (undated) DEVICE — KENDALL SCD EXPRESS SLEEVES, KNEE LENGTH, MEDIUM: Brand: KENDALL SCD

## (undated) DEVICE — SYRINGE IRRIG 60ML SFT PLIABLE BLB EZ TO GRP 1 HND USE W/

## (undated) DEVICE — WOUND RETRACTOR AND PROTECTOR: Brand: ALEXIS O WOUND PROTECTOR-RETRACTOR

## (undated) DEVICE — Z INACTIVE USE 2527070 DRAPE SURG W40XL44IN UNDERBUTTOCK SMS POLYPR W/ PCH BK DISP

## (undated) DEVICE — SEALER ENDOSCP NANO COAT OPN DIV CRV L JAW LIGASURE IMPACT

## (undated) DEVICE — SPONGE LAP W18XL18IN WHT COT 4 PLY FLD STRUNG RADPQ DISP ST

## (undated) DEVICE — DRAPE,REIN 53X77,STERILE: Brand: MEDLINE

## (undated) DEVICE — GLOVE ORANGE PI 7   MSG9070

## (undated) DEVICE — SUTURE PERMAHAND SZ 0 L30IN NONABSORBABLE BLK SILK BRAID A306H

## (undated) DEVICE — NEEDLE ECHOGENIC 20GA L4IN INSUL W/ 30DEG BVL EXTN SET

## (undated) DEVICE — DRESSING BORDERED ADH GZ UNIV GEN USE 8INX4IN AND 6INX2IN

## (undated) DEVICE — TRAY CATHETER 16FR F INCLUDE BARDX IC COMPLT CARE DRNGE BG

## (undated) DEVICE — SPETZLER/BARRACUDA LARGE DIAMETER TIP, UNIVERSAL

## (undated) DEVICE — SUTURE NONABSORBABLE MONOFILAMENT 3-0 PS-1 18 IN BLK ETHILON 1663H

## (undated) DEVICE — ELECTRODE PT RET AD L9FT HI MOIST COND ADH HYDRGEL CORDED

## (undated) DEVICE — BINDER ABD UNIV H9IN WAIST 45-62IN E SFT COT PREM 3 PNL

## (undated) DEVICE — Device

## (undated) DEVICE — DISPOSABLE TUBING SET AND EXTENDER FILTER TUBING

## (undated) DEVICE — GLOVE ORANGE PI 7 1/2   MSG9075

## (undated) DEVICE — CONTAINER,SPECIMEN,4OZ,OR STRL: Brand: MEDLINE

## (undated) DEVICE — SUTURE VCRL + SZ 0 L27IN ABSRB UD L36MM CT-1 1/2 CIR VCPP41D

## (undated) DEVICE — DECANTER FLD 9IN ST BG FOR ASEP TRNSF OF FLD

## (undated) DEVICE — SUTURE MCRYL SZ 4-0 L18IN ABSRB UD L16MM PC-3 3/8 CIR PRIM Y845G

## (undated) DEVICE — GEL US 20GM NONIRRITATING OVERWRAPPED FILE PCH TRNSMIT

## (undated) DEVICE — TOWEL,OR,DSP,ST,NATURAL,DLX,4/PK,20PK/CS: Brand: MEDLINE

## (undated) DEVICE — SUTURE PDS II SZ 0 L60IN ABSRB VLT L65MM TP-1 1/2 CIR Z991G

## (undated) DEVICE — PREP SOL PVP IODINE 4%  4 OZ/BTL

## (undated) DEVICE — PACK SURG ABD SVMMC

## (undated) DEVICE — GAUZE,SPONGE,FLUFF,6"X6.75",STRL,5/TRAY: Brand: MEDLINE

## (undated) DEVICE — BARRIER, ABSORBABLE, ADHESION: Brand: SEPRAFILM®

## (undated) DEVICE — COVER OR TBL W40XL90IN ABSRB STD AND GRIPPY BK SAHARA

## (undated) DEVICE — CHLORAPREP 26ML ORANGE

## (undated) DEVICE — SUTURE ABSORBABLE BRAIDED 0 CT-1 8X27 IN UD VICRYL JJ41G

## (undated) DEVICE — STRIP,CLOSURE,WOUND,MEDI-STRIP,1/2X4: Brand: MEDLINE